# Patient Record
Sex: FEMALE | Race: BLACK OR AFRICAN AMERICAN | NOT HISPANIC OR LATINO | Employment: OTHER | URBAN - METROPOLITAN AREA
[De-identification: names, ages, dates, MRNs, and addresses within clinical notes are randomized per-mention and may not be internally consistent; named-entity substitution may affect disease eponyms.]

---

## 2019-02-06 ENCOUNTER — OFFICE VISIT (OUTPATIENT)
Dept: RHEUMATOLOGY | Facility: CLINIC | Age: 69
End: 2019-02-06
Payer: MEDICARE

## 2019-02-06 ENCOUNTER — LAB VISIT (OUTPATIENT)
Dept: LAB | Facility: HOSPITAL | Age: 69
End: 2019-02-06
Attending: INTERNAL MEDICINE
Payer: MEDICARE

## 2019-02-06 VITALS
HEART RATE: 64 BPM | HEIGHT: 65 IN | SYSTOLIC BLOOD PRESSURE: 120 MMHG | WEIGHT: 137.13 LBS | DIASTOLIC BLOOD PRESSURE: 60 MMHG | BODY MASS INDEX: 22.85 KG/M2

## 2019-02-06 DIAGNOSIS — R53.83 FATIGUE, UNSPECIFIED TYPE: ICD-10-CM

## 2019-02-06 DIAGNOSIS — Z79.52 LONG TERM CURRENT USE OF SYSTEMIC STEROIDS: ICD-10-CM

## 2019-02-06 DIAGNOSIS — M35.1 MIXED CONNECTIVE TISSUE DISEASE: ICD-10-CM

## 2019-02-06 DIAGNOSIS — M35.1 MIXED CONNECTIVE TISSUE DISEASE: Primary | ICD-10-CM

## 2019-02-06 DIAGNOSIS — Z79.60 LONG-TERM USE OF IMMUNOSUPPRESSANT MEDICATION: ICD-10-CM

## 2019-02-06 DIAGNOSIS — Z71.89 COUNSELING ON HEALTH PROMOTION AND DISEASE PREVENTION: ICD-10-CM

## 2019-02-06 DIAGNOSIS — R49.0 DYSPHONIA: ICD-10-CM

## 2019-02-06 LAB
ALBUMIN SERPL BCP-MCNC: 3.5 G/DL
ALP SERPL-CCNC: 67 U/L
ALT SERPL W/O P-5'-P-CCNC: 35 U/L
ANION GAP SERPL CALC-SCNC: 11 MMOL/L
AST SERPL-CCNC: 51 U/L
BASOPHILS # BLD AUTO: 0.01 K/UL
BASOPHILS NFR BLD: 0.1 %
BILIRUB SERPL-MCNC: 0.3 MG/DL
BUN SERPL-MCNC: 11 MG/DL
C3 SERPL-MCNC: 126 MG/DL
C4 SERPL-MCNC: 19 MG/DL
CALCIUM SERPL-MCNC: 9.7 MG/DL
CCP AB SER IA-ACNC: 2 U/ML
CHLORIDE SERPL-SCNC: 104 MMOL/L
CO2 SERPL-SCNC: 25 MMOL/L
CREAT SERPL-MCNC: 0.8 MG/DL
CRP SERPL-MCNC: 10.5 MG/L
DIFFERENTIAL METHOD: ABNORMAL
EOSINOPHIL # BLD AUTO: 0 K/UL
EOSINOPHIL NFR BLD: 0.5 %
ERYTHROCYTE [DISTWIDTH] IN BLOOD BY AUTOMATED COUNT: 16 %
ERYTHROCYTE [SEDIMENTATION RATE] IN BLOOD BY WESTERGREN METHOD: 57 MM/HR
EST. GFR  (AFRICAN AMERICAN): >60 ML/MIN/1.73 M^2
EST. GFR  (NON AFRICAN AMERICAN): >60 ML/MIN/1.73 M^2
GLUCOSE SERPL-MCNC: 93 MG/DL
HCT VFR BLD AUTO: 39.4 %
HGB BLD-MCNC: 12.5 G/DL
LYMPHOCYTES # BLD AUTO: 1.3 K/UL
LYMPHOCYTES NFR BLD: 15.1 %
MCH RBC QN AUTO: 26.8 PG
MCHC RBC AUTO-ENTMCNC: 31.7 G/DL
MCV RBC AUTO: 84 FL
MONOCYTES # BLD AUTO: 0.3 K/UL
MONOCYTES NFR BLD: 4.1 %
NEUTROPHILS # BLD AUTO: 6.7 K/UL
NEUTROPHILS NFR BLD: 80.7 %
PLATELET # BLD AUTO: 277 K/UL
PMV BLD AUTO: 10.9 FL
POTASSIUM SERPL-SCNC: 4.3 MMOL/L
PROT SERPL-MCNC: 9.7 G/DL
RBC # BLD AUTO: 4.67 M/UL
RHEUMATOID FACT SERPL-ACNC: <10 IU/ML
SODIUM SERPL-SCNC: 140 MMOL/L
WBC # BLD AUTO: 8.34 K/UL

## 2019-02-06 PROCEDURE — 86431 RHEUMATOID FACTOR QUANT: CPT

## 2019-02-06 PROCEDURE — 86255 FLUORESCENT ANTIBODY SCREEN: CPT

## 2019-02-06 PROCEDURE — 84165 PROTEIN E-PHORESIS SERUM: CPT | Mod: 26,,, | Performed by: PATHOLOGY

## 2019-02-06 PROCEDURE — 86334 IMMUNOFIX E-PHORESIS SERUM: CPT

## 2019-02-06 PROCEDURE — G0009 ADMIN PNEUMOCOCCAL VACCINE: HCPCS | Mod: PBBFAC,PN

## 2019-02-06 PROCEDURE — 86160 COMPLEMENT ANTIGEN: CPT

## 2019-02-06 PROCEDURE — 86334 PATHOLOGIST INTERPRETATION IFE: ICD-10-PCS | Mod: 26,,, | Performed by: PATHOLOGY

## 2019-02-06 PROCEDURE — 86140 C-REACTIVE PROTEIN: CPT

## 2019-02-06 PROCEDURE — 90471 IMMUNIZATION ADMIN: CPT | Mod: PBBFAC,PN

## 2019-02-06 PROCEDURE — 80053 COMPREHEN METABOLIC PANEL: CPT

## 2019-02-06 PROCEDURE — 84165 PATHOLOGIST INTERPRETATION SPE: ICD-10-PCS | Mod: 26,,, | Performed by: PATHOLOGY

## 2019-02-06 PROCEDURE — 99203 OFFICE O/P NEW LOW 30 MIN: CPT | Mod: PBBFAC,PN,25 | Performed by: INTERNAL MEDICINE

## 2019-02-06 PROCEDURE — 86160 COMPLEMENT ANTIGEN: CPT | Mod: 59

## 2019-02-06 PROCEDURE — 99205 OFFICE O/P NEW HI 60 MIN: CPT | Mod: S$PBB,,, | Performed by: INTERNAL MEDICINE

## 2019-02-06 PROCEDURE — 85652 RBC SED RATE AUTOMATED: CPT

## 2019-02-06 PROCEDURE — 84165 PROTEIN E-PHORESIS SERUM: CPT

## 2019-02-06 PROCEDURE — 86225 DNA ANTIBODY NATIVE: CPT

## 2019-02-06 PROCEDURE — 99999 PR PBB SHADOW E&M-NEW PATIENT-LVL III: CPT | Mod: PBBFAC,,, | Performed by: INTERNAL MEDICINE

## 2019-02-06 PROCEDURE — 86334 IMMUNOFIX E-PHORESIS SERUM: CPT | Mod: 26,,, | Performed by: PATHOLOGY

## 2019-02-06 PROCEDURE — 86200 CCP ANTIBODY: CPT

## 2019-02-06 PROCEDURE — 85025 COMPLETE CBC W/AUTO DIFF WBC: CPT

## 2019-02-06 PROCEDURE — 99205 PR OFFICE/OUTPT VISIT, NEW, LEVL V, 60-74 MIN: ICD-10-PCS | Mod: S$PBB,,, | Performed by: INTERNAL MEDICINE

## 2019-02-06 PROCEDURE — 99999 PR PBB SHADOW E&M-NEW PATIENT-LVL III: ICD-10-PCS | Mod: PBBFAC,,, | Performed by: INTERNAL MEDICINE

## 2019-02-06 PROCEDURE — 83516 IMMUNOASSAY NONANTIBODY: CPT

## 2019-02-06 PROCEDURE — 90472 IMMUNIZATION ADMIN EACH ADD: CPT | Mod: PBBFAC,PN

## 2019-02-06 PROCEDURE — 80074 ACUTE HEPATITIS PANEL: CPT

## 2019-02-06 RX ORDER — PREDNISONE 10 MG/1
TABLET ORAL
Qty: 23 TABLET | Refills: 0 | Status: SHIPPED | OUTPATIENT
Start: 2019-02-06 | End: 2019-02-20

## 2019-02-06 RX ORDER — FOLIC ACID 0.8 MG
800 TABLET ORAL DAILY
COMMUNITY
End: 2023-06-26

## 2019-02-06 RX ORDER — CARVEDILOL 3.12 MG/1
3.12 TABLET ORAL 2 TIMES DAILY WITH MEALS
COMMUNITY
End: 2023-06-26

## 2019-02-06 RX ORDER — AMLODIPINE BESYLATE 5 MG/1
5 TABLET ORAL DAILY
COMMUNITY
End: 2023-06-26

## 2019-02-06 RX ORDER — LOSARTAN POTASSIUM 50 MG/1
50 TABLET ORAL DAILY
COMMUNITY
End: 2023-03-17

## 2019-02-06 RX ORDER — PREDNISONE 5 MG/1
5 TABLET ORAL DAILY
COMMUNITY
End: 2019-02-20 | Stop reason: SDUPTHER

## 2019-02-06 RX ORDER — PANTOPRAZOLE SODIUM 20 MG/1
20 TABLET, DELAYED RELEASE ORAL
COMMUNITY
End: 2023-03-27 | Stop reason: ALTCHOICE

## 2019-02-06 RX ORDER — AZATHIOPRINE 50 MG/1
50 TABLET ORAL 2 TIMES DAILY
COMMUNITY
End: 2019-02-20 | Stop reason: SDUPTHER

## 2019-02-06 NOTE — PROGRESS NOTES
Administered 0.5 cc Prevnar vaccination to Right Deltoid. Pt tolerated well. No acute reaction noted to site. Pt instructed on S/S to report. Pt verbalized understanding. Patient waited 15 minutes post injection    Lot:[id number]  Exp:9/20  Manu:[employer]    Administered 0.5cc TDap vaccination to Left Deltoid. Pt tolerated well. No acute reaction noted to site. Pt instructed on S/S to report. Pt verbalized understanding.    Lot:[id number]  Exp:10/25/2020  Manu:[employer]

## 2019-02-06 NOTE — PROGRESS NOTES
RHEUMATOLOGY OUTPATIENT CLINIC NOTE    2/6/2019    Attending Rheumatologist: Zachariah Thurman  Primary Care Provider: Ariana Rebollar MD   Physician Requesting Consultation: Ariana Rebollar MD  [address] [address]  Chief Complaint/Reason For Consultation:  Connective Tissue Disorder and New Patient      Subjective:       DOYLE Fernandes is a 68 y.o. Black or  female with historical diagnosis of mixed connective tissue disease refer for rheumatic evaluation.  Diagnosis was made by an outside rheumatologist after musculoskeletal complaints, Raynaud's phenomenon, sicca symptoms, positive autoantibodies, chronic dyspnea.  Further workup revealed restrictive lung pattern ILD and pulmonary hypertension by catheterization.  She has being on chronic immunosuppression with prednisone currently on 7.5 mg, from 40mg.  Was also started on azathioprine a steroid sparring agent for ILD.  Patient has developed severe GERD and odynophagia, worked up and managed by outside ENT and gastroenterologist.  Refers having a polyp removed by ENT in the past, has no being seen recently.  Faxed records and notes provided by patient today reviewed. Follows with GI as well.  Her symptoms have been attributed to MSK involvement from MCTD and probable progressive GERD being managed with PPIs.  Patient refers worsening dysphonia and comes for evaluation as a 3rd opinion.     Today:  Patient's main complaint is progressive dysphonia.  Worsening over the past 6 months without any particular precipitating event.  Refers association chronic dysphagia and intermittent odynophagia to both solids and liquids.  Voices no acute complaints otherwise.  Her dyspnea remains stable.  Refers also having chronic productive cough of yellowish sputum and brownish mucus plugs with intermittent blood tinge.  Denies having any joint pain or swelling.  Refers chronic paresthesias of her hands with median nerve  distribution.  Raynaud's is stable without ulcers.  No photosensitivity, rash,  or GI complaints otherwise.    Review of Systems   Constitutional: Negative for chills, fever and weight loss.   HENT:        Denies eye or mouth sicca symptoms, denies oral ulcers, denies parotid swelling, denies swollen glands.   Eyes: Negative for blurred vision, pain and redness.   Respiratory: Positive for cough (productive of yellowish sputum, intetmittent brownish and blood tinged discoloration), sputum production and shortness of breath.    Cardiovascular: Negative for chest pain, orthopnea, leg swelling and PND.   Gastrointestinal: Negative for abdominal pain, blood in stool, constipation, diarrhea and heartburn.        + dysphagia and dysphonia, chronic.   Genitourinary: Negative for dysuria and hematuria.        Denies genital ulcers or sicca symptoms, denies foaming of the urine   Musculoskeletal: Negative for back pain, joint pain, myalgias and neck pain.   Skin: Negative for rash.        Denies photosensitivity, denies alopecia, denies sclerodactyly,+ Raynaud's phenomenon,denies digital ulcers, no psoriasis, ulcerations, no purpura, denies nodules.   Neurological: Negative for tingling, sensory change, focal weakness, seizures, weakness and headaches.   Endo/Heme/Allergies: Does not bruise/bleed easily.   Psychiatric/Behavioral: Negative for substance abuse. The patient does not have insomnia.    All other systems reviewed and are negative.      Chronic comorbid conditions affecting medical decision making today:  Past Medical History:   Diagnosis Date    Allergy     GERD (gastroesophageal reflux disease)    · MCTD (MSK manifestations, RP, Sicca, + Ab, lung fibrosis, pHTN)  · Hypertension  · PAOLA    No past surgical history on file.  No family history on file.  Social History     Substance and Sexual Activity   Alcohol Use Not on file     Social History     Tobacco Use   Smoking Status Not on file     Social History      Substance and Sexual Activity   Drug Use Not on file     No current outpatient medications on file.     Objective:         Vitals:    02/06/19 1420   BP: 120/60   Pulse: 64     Physical Exam   Nursing note and vitals reviewed.  Constitutional: She is oriented to person, place, and time and well-developed, well-nourished, and in no distress. No distress.   HENT:   Head: Normocephalic.   + dysphonic voice.   Eyes: Conjunctivae are normal. Pupils are equal, round, and reactive to light.   Absent Episcleritis/scleritis.   Neck: Normal range of motion.   Cardiovascular: Normal rate and intact distal pulses.    Pulmonary/Chest: Effort normal. No respiratory distress.   Abdominal: Soft. She exhibits no distension.   Neurological: She is alert and oriented to person, place, and time. No cranial nerve deficit. Gait normal.   absent sensory deficits  muscle strength 5/5 through.   SLR -, Lhermitte's sign - Spurling's test -   Skin: No rash noted.     No Skin fibrosis, teleangiectasias, rashes, discoid lesions, purpura, skin ulcers, nodules, or livedo reticularis, nail pitting.   Psychiatric:   Anxious.   Musculoskeletal: Normal range of motion. She exhibits no edema or deformity.   : strong  No synovitis.    AROM: intact  PROM: intact    Devices used by patient: none       Reviewed old and all outside pertinent medical records available.    All lab results personally reviewed and interpreted by me.  No results found for: WBC, HGB, HCT, MCV, MCH, MCHC, RDW, PLT, MPV, NEUTROABS, LYMPHOABS, MONOABS, EOSINOABS, BASOSABS, NEUTROPCT, LYMPHOPCT, MONOPCT, EOSINOPCT, BASOPCT, DIFFTYPE, RBCMORPHOLOG, PLTEST    No results found for: NA, K, CL, CO2, GLU, BUN, LABCREA, CALCIUM, PROT, ALBUMIN, BILITOT, AST, ALKPHOS, ALT, GFRAA, GFRNONAA    No results found for: COLORU, APPEARANCEUA, SPECGRAV, PHUR, PROTEINUA, GLUCOSEU, KETONESU, BLOODU, LEUKOCYTESUR, NITRITE, UROBILINOGEN    No results found for: CRP    No results found for:  SEDRATE, ERYTHROCYTES    No results found for: ISMAEL, RF, SEDRATE    No components found for: 25OHVITDTOT, 19PYUPHJ2, 92ZFUNCA2, METHODNOTE    No results found for: URICACID    No components found for: TSPOTTB    Labs on referral attachment dated June 2018  · Urinalysis normal    Rheum Labs:  ISMAEL 1 in 320 speckled pattern  RNP 6.2 (reference less than 1)  SSA/B negative  Anti Smith negative  SCL 70 antibody negative  C3/4 within normal range  ESR 63  CRP 0.2     Infectious Labs:  n/a     Imaging:  All imaging reviewed and independently  interpreted by me.    CT maxillofacial June 2016  Septal deviation.  Mucous cyst right maxillary sinus.    CT chest November 2016  Patchy ground-glass opacities, improvement compared to previous imaging.     ASSESSMENT / PLAN:     Devi Fernandes is a 68 y.o. Black or  female with:    1.  Mixed connective tissue disease  - Historical diagnosis, per referral attachment based on musculoskeletal complaints, Raynaud's phenomenon, sicca symptoms  - also reported for, lung fibrosis, and pulmonary hypertension.  - she follows with outside pulmonologist which per patient, described with minimal changes in her fibrotic disease  - currently on management with PDN 7.5mg and AZA 50mg BID for several years  - patient brought significant records for review on her appointment.  Will review these and document after patient's visit.  - recommend trial of increasing prednisone from 20 back down to her dose of 7.5 mg consider polychondritis manifesting as dysphonia.  Considering patient's symptoms are mostly related to progressive GERD.  - will recommend for ENT evaluation for 2nd opinion for progressive dysphonia.  - Follow up with GI for GERD management  - recommend to assess for clinical markers of disease activity and end organ involvement.  Workup as below  - CBC, CMP, ESR, CRP  - C3/4, double-stranded DNA  - urinalysis, protein creatinine ratio  - SPEP, IFA    2. Chronic  DMARD use  - monitor for toxicity  - CBC, CMP  - acute hepatitis panel, QuantiFERON TB    3. Other specified counseling  - over 10 minutes spent regarding below topics:  - Immunization counseling done.  PSV13 and TDAP provided today.  Will recommend for PSV23 on 8 weeks  - Medication counseling provided.    RTC 2 weeks    Method of contact with patient concerns: [facility]    Disclaimer:  This note is prepared using voice recognition software and as such is likely to have errors and has not been proof read. Please contact me for questions.     Time spent: 60 minutes in face to face discussion concerning diagnosis, prognosis, review of lab and test results, benefits of treatment as well as management of disease, counseling of patient and coordination of care between various health care providers.  Greater than half the time spent was used for coordination of care and counseling of patient.    Zachariah Thurman M.D.  Rheumatology Department   [facility]

## 2019-02-06 NOTE — PATIENT INSTRUCTIONS
Azathioprine tablets  What is this medicine?  AZATHIOPRINE (ay za THYE oh preen) suppresses the immune system. It is used to prevent organ rejection after a transplant. It is also used to treat rheumatoid arthritis.  How should I use this medicine?  Take this medicine by mouth with a full glass of water. Follow the directions on the prescription label. Take your medicine at regular intervals. Do not take your medicine more often than directed. Continue to take your medicine even if you feel better. Do not stop taking except on your doctor's advice.  Talk to your pediatrician regarding the use of this medicine in children. Special care may be needed.  What side effects may I notice from receiving this medicine?  Side effects that you should report to your doctor or health care professional as soon as possible:  · allergic reactions like skin rash, itching or hives, swelling of the face, lips, or tongue  · changes in vision  · confusion  · fever, chills, or any other sign of infection  · loss of balance or coordination  · severe stomach pain  · unusual bleeding, bruising  · unusually weak or tired  · vomiting  · yellowing of the eyes or skin  Side effects that usually do not require medical attention (report to your doctor or health care professional if they continue or are bothersome):  · hair loss  · nausea  What may interact with this medicine?  Do not take this medicine with any of the following medications:  · febuxostat  · mercaptopurine  This medicine may also interact with the following medications:  · allopurinol  · aminosalicylates like sulfasalazine, mesalamine, balsalazide, and olsalazine  · leflunomide  · medicines called ACE inhibitors like benazepril, captopril, enalapril, fosinopril, quinapril, lisinopril, ramipril, and trandolapril  · mycophenolate  · sulfamethoxazole; trimethoprim  · vaccines  · warfarin  What if I miss a dose?  If you miss a dose, take it as soon as you can. If it is almost time  for your next dose, take only that dose. Do not take double or extra doses.  Where should I keep my medicine?  Keep out of the reach of children.  Store at room temperature between 15 and 25 degrees C (59 and 77 degrees F). Protect from light. Throw away any unused medicine after the expiration date.  What should I tell my health care provider before I take this medicine?  They need to know if you have any of these conditions:  · infection  · kidney disease  · liver disease  · an unusual or allergic reaction to azathioprine, other medicines, lactose, foods, dyes, or preservatives  · pregnant or trying to get pregnant  · breast feeding  What should I watch for while using this medicine?  Visit your doctor or health care professional for regular checks on your progress. You will need frequent blood checks during the first few months you are receiving the medicine.  If you get a cold or other infection while receiving this medicine, call your doctor or health care professional. Do not treat yourself. The medicine may increase your risk of getting an infection.  Women should inform their doctor if they wish to become pregnant or think they might be pregnant. There is a potential for serious side effects to an unborn child. Talk to your health care professional or pharmacist for more information.  Men may have a reduced sperm count while they are taking this medicine. Talk to your health care professional for more information.  This medicine may increase your risk of getting certain kinds of cancer. Talk to your doctor about healthy lifestyle choices, important screenings, and your risk.  NOTE:This sheet is a summary. It may not cover all possible information. If you have questions about this medicine, talk to your doctor, pharmacist, or health care provider. Copyright© 2017 [employer]        Prednisolone tablets  What is this medicine?  PREDNISOLONE (pred NISS oh lone) is a corticosteroid. It is commonly used to  treat inflammation of the skin, joints, lungs, and other organs. Common conditions treated include asthma, allergies, and arthritis. It is also used for other conditions, such as blood disorders and diseases of the adrenal glands.  How should I use this medicine?  Take this medicine by mouth with a glass of water. Follow the directions on the prescription label. Take it with food or milk to avoid stomach upset. If you are taking this medicine once a day, take it in the morning. Do not take more medicine than you are told to take. Do not suddenly stop taking your medicine because you may develop a severe reaction. Your doctor will tell you how much medicine to take. If your doctor wants you to stop the medicine, the dose may be slowly lowered over time to avoid any side effects.  Talk to your pediatrician regarding the use of this medicine in children. Special care may be needed.  What side effects may I notice from receiving this medicine?  Side effects that you should report to your doctor or health care professional as soon as possible:  · allergic reactions like skin rash, itching or hives, swelling of the face, lips, or tongue  · changes in emotions or moods  · changes in vision  · eye pain  · signs and symptoms of high blood sugar such as dizziness; dry mouth; dry skin; fruity breath; nausea; stomach pain; increased hunger or thirst; increased urination  · signs and symptoms of infection like fever or chills; cough; sore throat; pain or trouble passing urine  · slow growth in children (if used for longer periods of time)  · swelling of ankles, feet  · trouble sleeping  · unusually weak or tired  · weak bones (if used for longer periods of time)  Side effects that usually do not require medical attention (Report these to your doctor or health care professional if they continue or are bothersome.):  · increased hunger  · nausea  · skin problems, acne, thin and shiny skin  · upset stomach  · weight gain  What may  interact with this medicine?  Do not take this medicine with any of the following medications:  · metyrapone  · mifepristone  This medicine may also interact with the following medications:  · aminoglutethimide  · amphotericin B  · aspirin and aspirin-like medicines  · barbiturates  · certain medicines for diabetes, like glipizide or glyburide  · cholestyramine  · cholinesterase inhibitors  · cyclosporine  · digoxin  · diuretics  · ephedrine  · female hormones, like estrogens and birth control pills  · isoniazid  · ketoconazole  · NSAIDS, medicines for pain and inflammation, like ibuprofen or naproxen  · phenytoin  · rifampin  · toxoids  · vaccines  · warfarin  What if I miss a dose?  If you miss a dose, take it as soon as you can. If it is almost time for your next dose, take only that dose. Do not take double or extra doses.  Where should I keep my medicine?  Keep out of the reach of children.  Store at room temperature between 15 and 30 degrees C (59 and 86 degrees F). Keep container tightly closed. Throw away any unused medicine after the expiration date.  What should I tell my health care provider before I take this medicine?  They need to know if you have any of these conditions:  · Cushing's syndrome  · diabetes  · glaucoma  · heart problems or disease  · high blood pressure  · infection such as herpes, measles, tuberculosis, or chickenpox  · kidney disease  · liver disease  · mental problems  · myasthenia gravis  · osteoporosis  · seizures  · stomach ulcer or intestine disease including colitis and diverticulitis  · thyroid problem  · an unusual or allergic reaction to lactose, prednisolone, other medicines, foods, dyes, or preservatives  · pregnant or trying to get pregnant  · breast-feeding  What should I watch for while using this medicine?  Visit your doctor or health care professional for regular checks on your progress. If you are taking this medicine over a prolonged period, carry an identification  card with your name and address, the type and dose of your medicine, and your doctor's name and address.  This medicine may increase your risk of getting an infection. Tell your doctor or health care professional if you are around anyone with measles or chickenpox, or if you develop sores or blisters that do not heal properly.  If you are going to have surgery, tell your doctor or health care professional that you have taken this medicine within the last twelve months.  Ask your doctor or health care professional about your diet. You may need to lower the amount of salt you eat.  This medicine may affect blood sugar levels. If you have diabetes, check with your doctor or health care professional before you change your diet or the dose of your diabetic medicine.  NOTE:This sheet is a summary. It may not cover all possible information. If you have questions about this medicine, talk to your doctor, pharmacist, or health care provider. Copyright© 2017 [employer]

## 2019-02-06 NOTE — LETTER
February 6, 2019      Ariana Rebollar MD  [address]  [place] [address]           [facility] Rheumatology  [address] [place] [address] [address] [place] [address]  Phone: [phone]  Fax: [phone]          Patient: Devi Fernandes   MR Number: [id number]   YOB: 1950   Date of Visit: 2/6/2019       Dear Dr. Ariana Rebollar:    Thank you for referring Devi Fernandes to me for evaluation. Attached you will find relevant portions of my assessment and plan of care.    If you have questions, please do not hesitate to call me. I look forward to following Devi Fernandes along with you.    Sincerely,    Zachariah Thurman MD    Enclosure  CC:  No Recipients    If you would like to receive this communication electronically, please contact [email or web address] or [phone] to request more information on [facility] access.    For providers and/or their staff who would like to refer a patient to [facility], please contact us through our one-stop-shop provider referral line, [facility], at [phone].    If you feel you have received this communication in error or would no longer like to receive these types of communications, please e-mail [email or web address]

## 2019-02-07 ENCOUNTER — TELEPHONE (OUTPATIENT)
Dept: OBSTETRICS AND GYNECOLOGY | Facility: CLINIC | Age: 69
End: 2019-02-07

## 2019-02-07 LAB
ALBUMIN SERPL ELPH-MCNC: 3.56 G/DL
ALPHA1 GLOB SERPL ELPH-MCNC: 0.33 G/DL
ALPHA2 GLOB SERPL ELPH-MCNC: 0.93 G/DL
B-GLOBULIN SERPL ELPH-MCNC: 1.36 G/DL
DSDNA AB SER-ACNC: NORMAL [IU]/ML
GAMMA GLOB SERPL ELPH-MCNC: 2.82 G/DL
HAV IGM SERPL QL IA: NEGATIVE
HBV CORE IGM SERPL QL IA: NEGATIVE
HBV SURFACE AG SERPL QL IA: NEGATIVE
HCV AB SERPL QL IA: NEGATIVE
INTERPRETATION SERPL IFE-IMP: NORMAL
PATHOLOGIST INTERPRETATION IFE: NORMAL
PATHOLOGIST INTERPRETATION SPE: NORMAL
PROT SERPL-MCNC: 9 G/DL

## 2019-02-07 NOTE — TELEPHONE ENCOUNTER
Patient seen yesterday. States that she is suppose to start on a new medication and nothing sent to pharmacy.

## 2019-02-07 NOTE — TELEPHONE ENCOUNTER
----- Message from Barby Bush sent at 2/7/2019  8:17 AM CST -----  Contact: pt  Type:  Needs Medical Advice    Who Called: Pt  Symptoms (please be specific): Pt was in clinic yesterday, pt states she need more information and other questions.   How long has patient had these symptoms: na  Would the patient rather a call back or a response via [facility]? Call [facility] Call Back Number: [phone]  Additional Information: na

## 2019-02-08 LAB
ANCA AB TITR SER IF: NORMAL TITER
MYELOPEROXIDASE AB SER-ACNC: 9 UNITS
P-ANCA TITR SER IF: NORMAL TITER
PROTEINASE3 IGG SER-ACNC: <0.2 U

## 2019-02-18 ENCOUNTER — TELEPHONE (OUTPATIENT)
Dept: OBSTETRICS AND GYNECOLOGY | Facility: CLINIC | Age: 69
End: 2019-02-18

## 2019-02-18 ENCOUNTER — TELEPHONE (OUTPATIENT)
Dept: RHEUMATOLOGY | Facility: CLINIC | Age: 69
End: 2019-02-18

## 2019-02-18 NOTE — TELEPHONE ENCOUNTER
Spoke with Dr. Henry's office and they do not have any chest Ct records but they will fax over heart cath. Confirmed correct fax number.

## 2019-02-18 NOTE — TELEPHONE ENCOUNTER
----- Message from Koby Mclean sent at 2/18/2019 11:59 AM CST -----  Contact: pt  Type:  Needs Medical Advice    Who Called: Pt  Symptoms (please be specific): N/A   How long has patient had these symptoms:  N/A  Pharmacy name and phone #:  N/A  Would the patient rather a call back or a response via [facility]? Call nupur  Best Call Back Number: [phone] (home)   Additional Information: She is calling in regards to getting a copy of her CT Chest scan and a cath report of her RT side sent to 's office.

## 2019-02-18 NOTE — TELEPHONE ENCOUNTER
Spoke with patient. Informed her that I do not have any record of a Chest Ct or Cath report to send to Dr. Laguna. Patient states that the information given is wrong. Dr. Thurman is requesting the 2 test from Dr. Atkinson. Called and left message with Dr. Atkinson's medical records office ( [phone] )  to fax results.

## 2019-02-20 ENCOUNTER — OFFICE VISIT (OUTPATIENT)
Dept: OTOLARYNGOLOGY | Facility: CLINIC | Age: 69
End: 2019-02-20
Payer: MEDICARE

## 2019-02-20 ENCOUNTER — OFFICE VISIT (OUTPATIENT)
Dept: RHEUMATOLOGY | Facility: CLINIC | Age: 69
End: 2019-02-20
Payer: MEDICARE

## 2019-02-20 VITALS
HEART RATE: 75 BPM | DIASTOLIC BLOOD PRESSURE: 66 MMHG | SYSTOLIC BLOOD PRESSURE: 123 MMHG | WEIGHT: 139.56 LBS | BODY MASS INDEX: 23.22 KG/M2 | TEMPERATURE: 99 F

## 2019-02-20 VITALS
SYSTOLIC BLOOD PRESSURE: 130 MMHG | DIASTOLIC BLOOD PRESSURE: 78 MMHG | WEIGHT: 140.44 LBS | HEART RATE: 82 BPM | HEIGHT: 65 IN | BODY MASS INDEX: 23.4 KG/M2

## 2019-02-20 DIAGNOSIS — R74.8 ELEVATED CK: ICD-10-CM

## 2019-02-20 DIAGNOSIS — R49.0 DYSPHONIA: ICD-10-CM

## 2019-02-20 DIAGNOSIS — Z79.52 LONG TERM CURRENT USE OF SYSTEMIC STEROIDS: ICD-10-CM

## 2019-02-20 DIAGNOSIS — M35.1 MIXED CONNECTIVE TISSUE DISEASE: Primary | ICD-10-CM

## 2019-02-20 DIAGNOSIS — R49.0 DYSPHONIA: Primary | ICD-10-CM

## 2019-02-20 DIAGNOSIS — Z71.89 COUNSELING ON HEALTH PROMOTION AND DISEASE PREVENTION: ICD-10-CM

## 2019-02-20 DIAGNOSIS — H92.03 OTALGIA OF BOTH EARS: ICD-10-CM

## 2019-02-20 DIAGNOSIS — T17.308A CHOKING, INITIAL ENCOUNTER: ICD-10-CM

## 2019-02-20 DIAGNOSIS — Z79.60 LONG-TERM USE OF IMMUNOSUPPRESSANT MEDICATION: ICD-10-CM

## 2019-02-20 PROCEDURE — 99499 NO LOS: ICD-10-PCS | Mod: S$PBB,,, | Performed by: PHYSICIAN ASSISTANT

## 2019-02-20 PROCEDURE — 99214 OFFICE O/P EST MOD 30 MIN: CPT | Mod: S$PBB,,, | Performed by: INTERNAL MEDICINE

## 2019-02-20 PROCEDURE — 99999 PR PBB SHADOW E&M-EST. PATIENT-LVL III: ICD-10-PCS | Mod: PBBFAC,,, | Performed by: PHYSICIAN ASSISTANT

## 2019-02-20 PROCEDURE — 99213 OFFICE O/P EST LOW 20 MIN: CPT | Mod: PBBFAC | Performed by: PHYSICIAN ASSISTANT

## 2019-02-20 PROCEDURE — 99214 PR OFFICE/OUTPT VISIT, EST, LEVL IV, 30-39 MIN: ICD-10-PCS | Mod: S$PBB,,, | Performed by: INTERNAL MEDICINE

## 2019-02-20 PROCEDURE — 99999 PR PBB SHADOW E&M-EST. PATIENT-LVL III: CPT | Mod: PBBFAC,,, | Performed by: INTERNAL MEDICINE

## 2019-02-20 PROCEDURE — 99999 PR PBB SHADOW E&M-EST. PATIENT-LVL III: CPT | Mod: PBBFAC,,, | Performed by: PHYSICIAN ASSISTANT

## 2019-02-20 PROCEDURE — 99999 PR PBB SHADOW E&M-EST. PATIENT-LVL III: ICD-10-PCS | Mod: PBBFAC,,, | Performed by: INTERNAL MEDICINE

## 2019-02-20 PROCEDURE — 99213 OFFICE O/P EST LOW 20 MIN: CPT | Mod: PBBFAC,27,PN | Performed by: INTERNAL MEDICINE

## 2019-02-20 PROCEDURE — 99499 UNLISTED E&M SERVICE: CPT | Mod: S$PBB,,, | Performed by: PHYSICIAN ASSISTANT

## 2019-02-20 RX ORDER — PREDNISONE 5 MG/1
5 TABLET ORAL DAILY
Qty: 10 TABLET | Refills: 0 | Status: SHIPPED | OUTPATIENT
Start: 2019-02-20 | End: 2019-03-02

## 2019-02-20 RX ORDER — AZATHIOPRINE 50 MG/1
50 TABLET ORAL 2 TIMES DAILY
Qty: 60 TABLET | Refills: 2 | Status: SHIPPED | OUTPATIENT
Start: 2019-02-20 | End: 2019-02-26 | Stop reason: SDUPTHER

## 2019-02-20 RX ORDER — PREDNISONE 5 MG/1
5 TABLET ORAL DAILY
Qty: 30 TABLET | Refills: 2 | Status: SHIPPED | OUTPATIENT
Start: 2019-02-20 | End: 2019-02-27 | Stop reason: SDUPTHER

## 2019-02-20 NOTE — PROGRESS NOTES
RHEUMATOLOGY OUTPATIENT CLINIC NOTE    2/20/2019    Attending Rheumatologist: Zachariah Thurman  Primary Care Provider: Ariana Rebollar MD   Physician Requesting Consultation: No referring provider defined for this encounter.  Chief Complaint/Reason For Consultation:  MCTD      Subjective:       DOYLE Fernandes is a 68 y.o. Black or  female MCTD with ILD comes for follow up.    Today:  Last seen on 2/6.  Main complaint was worsening dysphonia, recommended for blood work and consider for the differential of active relapsing polychondritis or vasculitis.  Recommended to increase prednisone and taper slowly back to 7.5 mg per day.  Recommended for ENT evaluation.  Voices no complaints today.  Still with main issue of having dysphonia.  No effective dosage increase of prednisone.  Patient informs me today that she has been of as a therapy for the last 3 months.  Denies any worsening dyspnea.  Still with intermittent cough-nonproductive.  Denies any new joint pain, swelling,  or GI complaints otherwise.    Review of Systems   Constitutional: Negative for chills and fever.   HENT:        Denies eye or mouth sicca symptoms, denies oral ulcers, denies parotid swelling, denies swollen glands.   Eyes: Negative for pain and redness.   Respiratory: Positive for cough (productive of yellowish sputum, intetmittent brownish and blood tinged discoloration) and shortness of breath.    Cardiovascular: Negative for chest pain and leg swelling.   Gastrointestinal: Negative for abdominal pain, blood in stool and heartburn.        + dysphagia and dysphonia, chronic.   Genitourinary: Negative for dysuria, hematuria and urgency.   Musculoskeletal: Negative for back pain and joint pain.   Skin: Negative for rash.        Denies photosensitivity, denies alopecia, denies sclerodactyly,+ Raynaud's phenomenon,denies digital ulcers, no psoriasis, ulcerations, no purpura, denies nodules.   Neurological: Negative  for tingling and focal weakness.   Endo/Heme/Allergies: Does not bruise/bleed easily.   Psychiatric/Behavioral: Negative for substance abuse. The patient does not have insomnia.    All other systems reviewed and are negative.      Chronic comorbid conditions affecting medical decision making today:  Past Medical History:   Diagnosis Date    Allergy     Aortic regurgitation     Arthritis     Deviated nasal septum     Diabetes mellitus     Dysphagia     GERD (gastroesophageal reflux disease)     Hypertension     PAOLA (obstructive sleep apnea)     Pulmonary fibrosis     Pulmonary hypertension     Pulmonic regurgitation     Restrictive lung disease     Sinus bradycardia     Valvular heart disease    · MCTD (MSK manifestations, RP, Sicca, + Ab, lung fibrosis, pHTN)  · Hypertension  · PAOLA    Past Surgical History:   Procedure Laterality Date    APPENDECTOMY       SECTION      COLONOSCOPY  2011    ESOPHAGEAL MANOMETRY  2018    GASTROSCOPY  02/15/2018     Family History   Problem Relation Age of Onset    Cancer Mother     Diabetes Mellitus Father      Social History     Substance and Sexual Activity   Alcohol Use No    Frequency: Never     Social History     Tobacco Use   Smoking Status Never Smoker   Smokeless Tobacco Never Used     Social History     Substance and Sexual Activity   Drug Use No       Current Outpatient Medications:     amLODIPine (NORVASC) 5 MG tablet, Take 5 mg by mouth once daily., Disp: , Rfl:     azaTHIOprine (IMURAN) 50 mg Tab, Take 1 tablet (50 mg total) by mouth 2 (two) times daily., Disp: 60 tablet, Rfl: 2    carvedilol (COREG) 3.125 MG tablet, Take 3.125 mg by mouth 2 (two) times daily with meals., Disp: , Rfl:     folic acid (FOLVITE) 800 MCG Tab, Take 800 mcg by mouth once daily., Disp: , Rfl:     hypromellose (SYSTANE GEL OPHT), Apply 1 drop to eye 3 (three) times daily. I gel drop to each eye TID, Disp: , Rfl:     losartan (COZAAR) 50 MG tablet,  Take 50 mg by mouth once daily., Disp: , Rfl:     pantoprazole (PROTONIX) 20 MG tablet, Take 20 mg by mouth 2 (two) times daily before meals., Disp: , Rfl:     predniSONE (DELTASONE) 5 MG tablet, Take 1 tablet (5 mg total) by mouth once daily., Disp: 30 tablet, Rfl: 2    predniSONE (DELTASONE) 5 MG tablet, Take 1 tablet (5 mg total) by mouth once daily. for 10 days, Disp: 10 tablet, Rfl: 0     Objective:         Vitals:    02/20/19 1209   BP: 130/78   Pulse: 82     Physical Exam   Nursing note and vitals reviewed.  Constitutional: She is oriented to person, place, and time and well-developed, well-nourished, and in no distress. No distress.   HENT:   Head: Normocephalic.   + dysphonic voice.   Eyes: Conjunctivae are normal. Pupils are equal, round, and reactive to light.   Absent Episcleritis/scleritis.   Neck: Normal range of motion.   Cardiovascular: Normal rate and intact distal pulses.    Pulmonary/Chest: Effort normal. No respiratory distress.   Abdominal: Soft. She exhibits no distension.   Neurological: She is alert and oriented to person, place, and time.   absent sensory deficits  muscle strength 5/5 through.    Skin: No rash noted.     No Skin fibrosis, teleangiectasias, rashes, discoid lesions, purpura, skin ulcers, nodules, or livedo reticularis, nail pitting.   Psychiatric:   Anxious.   Musculoskeletal: Normal range of motion. She exhibits no edema or deformity.   : strong  No synovitis.    AROM: intact  PROM: intact    Devices used by patient: none       Reviewed old and all outside pertinent medical records available.    All lab results personally reviewed and interpreted by me.  Lab Results   Component Value Date    WBC 8.34 02/06/2019    HGB 12.5 02/06/2019    HCT 39.4 02/06/2019    MCV 84 02/06/2019    MCH 26.8 (L) 02/06/2019    MCHC 31.7 (L) 02/06/2019    RDW 16.0 (H) 02/06/2019     02/06/2019    MPV 10.9 02/06/2019       Lab Results   Component Value Date     02/06/2019    K  4.3 02/06/2019     02/06/2019    CO2 25 02/06/2019    GLU 93 02/06/2019    BUN 11 02/06/2019    CALCIUM 9.7 02/06/2019    PROT 9.7 (H) 02/06/2019    ALBUMIN 3.5 02/06/2019    BILITOT 0.3 02/06/2019    AST 51 (H) 02/06/2019    ALKPHOS 67 02/06/2019    ALT 35 02/06/2019       No results found for: COLORU, APPEARANCEUA, SPECGRAV, PHUR, PROTEINUA, GLUCOSEU, KETONESU, BLOODU, LEUKOCYTESUR, NITRITE, UROBILINOGEN    Lab Results   Component Value Date    CRP 10.5 (H) 02/06/2019       Lab Results   Component Value Date    SEDRATE 57 (H) 02/06/2019       Lab Results   Component Value Date    RF <10.0 02/06/2019    SEDRATE 57 (H) 02/06/2019       No components found for: 25OHVITDTOT, 13UTWTOQ5, 47OEHVDO4, METHODNOTE    No results found for: URICACID    No components found for: TSPOTTB    Labs on referral attachment dated June 2018  · Urinalysis normal    Rheum Labs:  ISMAEL 1 in 320 speckled pattern  RNP 6.2 (reference less than 1)  SSA/B negative  Anti histone 3.5 (+)  Anti Smith negative  SCL 70 antibody negative  C3/4 within normal range  Double-stranded DNA negative  ESR 63-> 57  CRP 0.2 -> 10.5  ANCA negative, MPO/UT 3 negative    Infectious Labs:  Hepatitis profile nonreactive    Imaging:  All imaging reviewed and independently  interpreted by me.    CT maxillofacial June 2016  Septal deviation.  Mucous cyst right maxillary sinus.    CT chest November 2016  Patchy ground-glass opacities, improvement compared to previous imaging.     ASSESSMENT / PLAN:     Devi Fernandes is a 68 y.o. Black or  female with:    1.  Mixed connective tissue disease  - associated with ILD anf pHTN.  These have remained stable for workup provided by patient.  - follows with a outside pulmonologist.  - no response to dysphonia with increased on prednisone.  Will continue with 5 mg per day.  - informs has been of a tired pain for the past 3 months.  Reason 50 mg b.i.d. for history of ILD.  - depending on symptoms and  objective studies, will consider tapering dose of Imuran.  - patient recently saw ENT.  Per her request, will await MDs input. Will follow-up assessment and recommendations for dysphonia.  - patient likely also has a component from GERD contributing to her symptoms.  She should follow with GI.  - markers of disease activity within normal range.  No objective evidence of vasculitic process.  - will continue to monitor    2. Chronic DMARD use  - monitor for toxicity  - CBC, CMP    3. Other specified counseling  - over 10 minutes spent regarding below topics:  - Immunization counseling done.  PSV13 given on 2/6.  PSV23 will be provided on next visit.  - Medication counseling provided.    RTC 3 months    Method of contact with patient concerns: [facility]    Disclaimer:  This note is prepared using voice recognition software and as such is likely to have errors and has not been proof read. Please contact me for questions.     Addendum:    - Velopharyngeal insufficiency, dysphonia, dysphagia, and soft palate paralysis.    - considering MCTD related myositis of pharyngeal muscle involvement (rare).  Would represent severe disease and is a poor prognostic feature.    - Agree with ENT in ruling out other neurological etiology, greatly appreciate the input.    - Recommend to re-check acute phase reactants and muscle enzymes, along with Myomarker panel.    - Will follow up MRI, pending results.  Depending on findings, may consider high dose corticosteroids and IVIG therapy.  - Discussed with patient, verbalized understanding    Zachariah Thurman M.D.  Rheumatology Department   [facility]

## 2019-02-20 NOTE — LETTER
February 20, 2019      Zachariah Thurman MD  [address] [place] [address]  [address]  [facility] [address]           [facility] O[facility]hinol[facility]ology  [address] [place] [id number]  Phone: [phone]  Fax: [phone]          Patient: Devi Fernandes   MR Number: [id number]   YOB: 1950   Date of Visit: 2/20/2019       Dear Dr. Zachariah Thurman:    Thank you for referring Devi Fernandes to me for evaluation. Attached you will find relevant portions of my assessment and plan of care.    If you have questions, please do not hesitate to call me. I look forward to following Devi Fernandes along with you.    Sincerely,    EUGENIE Julesosure  CC:  No Recipients    If you would like to receive this communication electronically, please contact [email or web address] or [phone] to request more information on [facility] access.    For providers and/or their staff who would like to refer a patient to [facility], please contact us through our one-stop-shop provider referral line, [facility], at [phone].    If you feel you have received this communication in error or would no longer like to receive these types of communications, please e-mail [email or web address]

## 2019-02-20 NOTE — PROGRESS NOTES
Subjective:       Patient ID: Devi Fernandes is a 68 y.o. female.    Chief Complaint: Dysphonia (Hx of Vocal cord surgery )    Ms. Fernandes is a very pleasant 67 yo female here to see me today with complaints of persistent hoarseness, difficulty swallowing, choking (worse with solid food), bilateral ear pain ( R>L). She has a complex medical history including connective tissue disorder (followed by doctor in [place]) and saw Dr. Thurman last month. She has  had vocal cord surgery (nodules)  and ear tube placement in 2017 in [place].  PCP referred her to [facility] but they were not accepting patients and recommended [facility] in [facility] but was able to get in here  To see us sooner.  Her initial Diagnosis was made by an outside rheumatologist after musculoskeletal complaints, Raynaud's phenomenon, sicca symptoms, positive autoantibodies, chronic dyspnea.  Further workup revealed restrictive lung pattern ILD and pulmonary hypertension by catheterization.  She has being on chronic immunosuppression with prednisone currently on 7.5 mg, from 40mg.  Was also started on azathioprine a steroid sparring agent for ILD.  Patient has developed severe GERD and odynophagia.      Review of Systems   Constitutional: Negative for chills, fatigue, fever and unexpected weight change.   HENT: Positive for ear pain, tinnitus and trouble swallowing. Negative for congestion, dental problem, ear discharge, facial swelling, hearing loss, nosebleeds, postnasal drip, rhinorrhea, sinus pressure, sneezing, sore throat and voice change.    Eyes: Negative for redness, itching and visual disturbance.   Respiratory: Positive for cough, choking and shortness of breath. Negative for wheezing.    Cardiovascular: Negative for chest pain and palpitations.   Gastrointestinal: Negative for abdominal pain.        No reflux.   Musculoskeletal: Negative for gait problem.   Skin: Negative for rash.   Neurological: Negative for dizziness,  light-headedness and headaches.       Objective:      Physical Exam   Constitutional: She is oriented to person, place, and time. She appears well-developed and well-nourished. No distress.   HENT:   Head: Normocephalic and atraumatic.   Right Ear: Tympanic membrane normal. No middle ear effusion.   Left Ear: Tympanic membrane normal.  No middle ear effusion.   Nose: Nose normal. No mucosal edema, rhinorrhea, nasal deformity or septal deviation. No epistaxis. Right sinus exhibits no maxillary sinus tenderness and no frontal sinus tenderness. Left sinus exhibits no maxillary sinus tenderness and no frontal sinus tenderness.   Mouth/Throat: Uvula is midline, oropharynx is clear and moist and mucous membranes are normal. Mucous membranes are not pale and not dry. No dental caries. No oropharyngeal exudate or posterior oropharyngeal erythema.   PET in right ear canal, Left PET embedded in cerumen, +hoarseness    Eyes: Conjunctivae, EOM and lids are normal. Pupils are equal, round, and reactive to light. Right eye exhibits no chemosis. Left eye exhibits no chemosis. Right conjunctiva is not injected. Left conjunctiva is not injected. No scleral icterus. Right eye exhibits normal extraocular motion and no nystagmus. Left eye exhibits normal extraocular motion and no nystagmus.   Neck: Trachea normal and phonation normal. No tracheal tenderness present. No tracheal deviation present. No thyroid mass and no thyromegaly present.   Cardiovascular: Intact distal pulses.   Pulmonary/Chest: Effort normal. No stridor. No respiratory distress.   Abdominal: She exhibits no distension.   Lymphadenopathy:        Head (right side): No submental, no submandibular, no preauricular, no posterior auricular and no occipital adenopathy present.        Head (left side): Submandibular adenopathy present. No submental, no preauricular, no posterior auricular and no occipital adenopathy present.     She has no cervical adenopathy.    Neurological: She is alert and oriented to person, place, and time. No cranial nerve deficit.   Skin: Skin is warm and dry. No rash noted. No erythema.   Psychiatric: She has a normal mood and affect. Her behavior is normal.       Assessment:       1. Dysphonia    2. Choking, initial encounter    3. Otalgia of both ears        Plan:       Pt questioned why she was not seeing the doctor today. I informed her that I was the only provider at this location today. I offered to not charge her for my visit and will have her return to clinic to see Dr. Andujar. She will need a scope to evaluate vocal cords. We also discussed the possibility of a swallow study. I had her sign a release form and would like to obtain her previous medical records from [place] and [place]. I would also like her to schedule an audiogram.  Pt understands and agrees with plan of care.

## 2019-02-22 ENCOUNTER — TELEPHONE (OUTPATIENT)
Dept: PHARMACY | Facility: CLINIC | Age: 69
End: 2019-02-22

## 2019-02-26 RX ORDER — AZATHIOPRINE 50 MG/1
50 TABLET ORAL 2 TIMES DAILY
COMMUNITY
End: 2019-02-26 | Stop reason: SDUPTHER

## 2019-02-26 NOTE — TELEPHONE ENCOUNTER
----- Message from Sapna Johnson PharmD sent at 2/26/2019  1:24 PM CST -----  Regarding: Azathioprine  Good afternoon Dr. Thurman and staff,    A prior authorization was not required for Ms. Fernandes's azathioprine and she would like to continue using her local pharmacy. Please send prescription to [facility] in [place], which has been added to the patients EPIC profile. Patient has been notified to obtain medication there once reordered.    To complete this in EPIC, the original order MUST be discontinued and re-typed as a new prescription with the updated pharmacy listed. Clicking reorder will continue to route the rx to [facility] even if the pharmacy is changed. [facility] when the BPA is fired.    Please let me know if I can be of further assistance. Thank you,  Sapna Johnson  [facility]  [phone]

## 2019-02-27 ENCOUNTER — OFFICE VISIT (OUTPATIENT)
Dept: OTOLARYNGOLOGY | Facility: CLINIC | Age: 69
End: 2019-02-27
Payer: MEDICARE

## 2019-02-27 ENCOUNTER — TELEPHONE (OUTPATIENT)
Dept: OTOLARYNGOLOGY | Facility: CLINIC | Age: 69
End: 2019-02-27

## 2019-02-27 VITALS
WEIGHT: 135.81 LBS | DIASTOLIC BLOOD PRESSURE: 71 MMHG | TEMPERATURE: 99 F | HEART RATE: 125 BPM | SYSTOLIC BLOOD PRESSURE: 127 MMHG | BODY MASS INDEX: 22.6 KG/M2

## 2019-02-27 DIAGNOSIS — R49.0 DYSPHONIA: ICD-10-CM

## 2019-02-27 DIAGNOSIS — R13.10 DYSPHAGIA, UNSPECIFIED TYPE: ICD-10-CM

## 2019-02-27 DIAGNOSIS — K13.79 SOFT PALATE PARALYSIS: ICD-10-CM

## 2019-02-27 DIAGNOSIS — G52.9 CRANIAL NERVE PALSY: Primary | ICD-10-CM

## 2019-02-27 DIAGNOSIS — K13.79 VELOPHARYNGEAL INSUFFICIENCY, ACQUIRED: ICD-10-CM

## 2019-02-27 PROCEDURE — 31575 PR LARYNGOSCOPY, FLEXIBLE; DIAGNOSTIC: ICD-10-PCS | Mod: S$PBB,,, | Performed by: ORTHOPAEDIC SURGERY

## 2019-02-27 PROCEDURE — 99999 PR PBB SHADOW E&M-EST. PATIENT-LVL IV: ICD-10-PCS | Mod: PBBFAC,,, | Performed by: ORTHOPAEDIC SURGERY

## 2019-02-27 PROCEDURE — 31575 DIAGNOSTIC LARYNGOSCOPY: CPT | Mod: PBBFAC,PN | Performed by: ORTHOPAEDIC SURGERY

## 2019-02-27 PROCEDURE — 31575 DIAGNOSTIC LARYNGOSCOPY: CPT | Mod: S$PBB,,, | Performed by: ORTHOPAEDIC SURGERY

## 2019-02-27 PROCEDURE — 99999 PR PBB SHADOW E&M-EST. PATIENT-LVL IV: CPT | Mod: PBBFAC,,, | Performed by: ORTHOPAEDIC SURGERY

## 2019-02-27 PROCEDURE — 99204 PR OFFICE/OUTPT VISIT, NEW, LEVL IV, 45-59 MIN: ICD-10-PCS | Mod: 25,S$PBB,, | Performed by: ORTHOPAEDIC SURGERY

## 2019-02-27 PROCEDURE — 99204 OFFICE O/P NEW MOD 45 MIN: CPT | Mod: 25,S$PBB,, | Performed by: ORTHOPAEDIC SURGERY

## 2019-02-27 PROCEDURE — 99214 OFFICE O/P EST MOD 30 MIN: CPT | Mod: PBBFAC,PN,25 | Performed by: ORTHOPAEDIC SURGERY

## 2019-02-27 NOTE — Clinical Note
When I saw this lady yesterday it was crazy, and I did not order an MRI as I had intended- please call her and schedule MRI on the SAME DAY as her upcoming swallow study, she is driving from [place] [employer].  Thanks.

## 2019-02-27 NOTE — Clinical Note
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  I think this is more likely to be a neurological issue, I would not think that MCTD would cause these issues- thanks!

## 2019-02-27 NOTE — PROGRESS NOTES
Subjective:       Patient ID: Devi Fernandes is a 68 y.o. female.    Chief Complaint: Other (scope eval.)    Ms. Fernandes is a very pleasant 69 yo female here to see me today with complaints of persistent hoarseness, difficulty swallowing, choking (worse with solid food), bilateral ear pain ( R>L). She has a complex medical history including connective tissue disorder (followed by doctor in [place]) and saw Dr. Thurman last month. She has  had vocal cord surgery (nodules, she says it was only on one vocal cord initially, and then on recheck she was told it was on both vocal cords)  and ear tube placement in 2017 in [place].  PCP referred her to [facility] but they were not accepting patients and recommended [facility] in [facility] but was able to get in here to see us sooner.  Her initial diagnosis was made by an outside rheumatologist after musculoskeletal complaints, Raynaud's phenomenon, sicca symptoms, positive autoantibodies, chronic dyspnea.  Further workup revealed restrictive lung pattern ILD and pulmonary hypertension by catheterization.  She has being on chronic immunosuppression with prednisone currently on 7.5 mg, from 40mg.  Was also started on azathioprine a steroid sparring agent for ILD.  Patient has developed severe GERD and odynophagia.  With regards to her voice, she says that has had vocal issues for the last two years.  She has noted food coming out of her nose with swallowing, she says that it happens several times daily.  She also has significant coughing and choking with swallowing, worse with liquids.  She has had a swallow study done, at [facility] in [place], about 18 months ago, was not having nasal regurgitation at that time.      Review of Systems   Constitutional: Negative for chills, fatigue, fever and unexpected weight change.   HENT: Positive for congestion, ear pain, tinnitus (pulsatile at times), trouble swallowing and voice change. Negative for dental problem, ear  discharge, facial swelling, hearing loss, nosebleeds, postnasal drip, rhinorrhea, sinus pressure, sneezing and sore throat.    Eyes: Negative for redness, itching and visual disturbance.   Respiratory: Negative for cough, choking, shortness of breath and wheezing.    Cardiovascular: Negative for chest pain and palpitations.   Gastrointestinal: Negative for abdominal pain.        No reflux.   Musculoskeletal: Negative for gait problem.   Skin: Negative for rash.   Neurological: Positive for light-headedness and headaches. Negative for dizziness.       Objective:      Physical Exam   Constitutional: She is oriented to person, place, and time. She appears well-developed and well-nourished. No distress.   HENT:   Head: Normocephalic and atraumatic.   Right Ear: Tympanic membrane and external ear normal. A foreign body (extruded PET in ear canal) is present. Tympanic membrane is not perforated. No middle ear effusion.   Left Ear: Tympanic membrane, external ear and ear canal normal. Tympanic membrane is not perforated.  No middle ear effusion.   Nose: Nose normal. No mucosal edema, rhinorrhea, nasal deformity or septal deviation. No epistaxis. Right sinus exhibits no maxillary sinus tenderness and no frontal sinus tenderness. Left sinus exhibits no maxillary sinus tenderness and no frontal sinus tenderness.   Mouth/Throat: Uvula is midline, oropharynx is clear and moist and mucous membranes are normal. Mucous membranes are not pale and not dry. No dental caries. No oropharyngeal exudate or posterior oropharyngeal erythema.   Extreme hypernasality, loss of gag reflex   Eyes: Conjunctivae, EOM and lids are normal. Pupils are equal, round, and reactive to light. Right eye exhibits no chemosis. Left eye exhibits no chemosis. Right conjunctiva is not injected. Left conjunctiva is not injected. No scleral icterus. Right eye exhibits normal extraocular motion and no nystagmus. Left eye exhibits normal extraocular motion and no  nystagmus.   Neck: Trachea normal and phonation normal. No tracheal tenderness present. No tracheal deviation present. No thyroid mass and no thyromegaly present.   Cardiovascular: Intact distal pulses.   Pulmonary/Chest: Effort normal. No stridor. No respiratory distress.   Abdominal: She exhibits no distension.   Lymphadenopathy:        Head (right side): No submental, no submandibular, no preauricular, no posterior auricular and no occipital adenopathy present.        Head (left side): No submental, no submandibular, no preauricular, no posterior auricular and no occipital adenopathy present.     She has no cervical adenopathy.   Neurological: She is alert and oriented to person, place, and time. No cranial nerve deficit.   Skin: Skin is warm and dry. No rash noted. No erythema.   Psychiatric: She has a normal mood and affect. Her behavior is normal.       Due to indication in patient's history, presentation or risk factors,  a fiber optic exam was performed.    SEPARATE PROCEDURE NOTE:    ANESTHESIA:  Topical xylocaine with wesley-synephrine  FINDINGS:  Extreme pooling of secretions around the glottis, visible aspiration on exam      PROCEDURE:  After verbal consent was obtained, the flexible scope was passed through the patient's nasal cavity without difficulty.  The nasopharynx (adenoid pad) and eustachian tube orifices were first visualized and were found to be normal, without masses or irregularity.  The posterior pharyngeal wall and base of tongue were then examined and no mass or irregular tissue was seen.  The scope was then advanced to the larynx, and the epiglottis, valleculae, and piriform sinuses were normal, without masses or mucosal irregularity.  The false vocal folds and true vocal folds were then examined and were found to have normal mobility (full abduction and adduction) and no masses or mucosal irregularity was seen.  The arytenoids and the interarytenoid area were normal.  She had copious  pooling of secretions surrounding her glottis, and had no cough reflex even with visualized aspiration of secretions.      Old records reviewed, but only audiogram sent over, slight neural loss in the mid-tones only        Assessment:       1. Cranial nerve palsy    2. Velopharyngeal insufficiency, acquired    3. Dysphonia    4. Dysphagia, unspecified type    5. Soft palate paralysis        Plan:       1.  VPI, soft palate paralysis:  Discussed with patient at great length that her dysphonia is not related to nodules on her vocal cords as she has been previously told.  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  This is the cause for her dysphonia, as she is having extreme hypernasality to her voice.  On her scope, she had copious secretions pooling around her glottis also suggestion that she has had a loss of sensation in the area.  I would recommend an MRI of the brain and skull base ASAP as well as a swallow study.  Discussed with patient that I am concerned she has an underlying neurological disorder that has not yet been diagnosed.  Will try and coordinate her care as much as possible to decrease her driving, is coming from [facility].

## 2019-02-28 ENCOUNTER — TELEPHONE (OUTPATIENT)
Dept: OTOLARYNGOLOGY | Facility: CLINIC | Age: 69
End: 2019-02-28

## 2019-02-28 NOTE — TELEPHONE ENCOUNTER
Mely was able to get the swallow study booked for Tuesday, March 5th.  She was told by radiology scheduling that the first available at [facility] [place] [place] is in 2 weeks.  Mely called [facility] and they could not do the scan on the 5th.  They booked her for her MRI in [place] the same day.

## 2019-02-28 NOTE — TELEPHONE ENCOUNTER
----- Message from Shalini Andujar MD sent at 2/28/2019  8:31 AM CST -----  When I saw this lady yesterday it was crazy, and I did not order an MRI as I had intended- please call her and schedule MRI on the SAME DAY as her upcoming swallow study, she is driving from [employer].  Thanks.

## 2019-03-01 RX ORDER — AZATHIOPRINE 50 MG/1
50 TABLET ORAL 2 TIMES DAILY
Qty: 60 TABLET | Refills: 2 | Status: SHIPPED | OUTPATIENT
Start: 2019-03-01 | End: 2019-05-31

## 2019-03-07 ENCOUNTER — HOSPITAL ENCOUNTER (OUTPATIENT)
Dept: RADIOLOGY | Facility: HOSPITAL | Age: 69
Discharge: HOME | End: 2019-03-07
Attending: ORTHOPAEDIC SURGERY
Payer: MEDICARE

## 2019-03-07 ENCOUNTER — TELEPHONE (OUTPATIENT)
Dept: RHEUMATOLOGY | Facility: CLINIC | Age: 69
End: 2019-03-07

## 2019-03-07 DIAGNOSIS — R13.10 DYSPHAGIA, UNSPECIFIED TYPE: ICD-10-CM

## 2019-03-07 DIAGNOSIS — R49.0 DYSPHONIA: ICD-10-CM

## 2019-03-07 DIAGNOSIS — K13.79 VELOPHARYNGEAL INSUFFICIENCY, ACQUIRED: ICD-10-CM

## 2019-03-07 DIAGNOSIS — G52.9 CRANIAL NERVE PALSY: ICD-10-CM

## 2019-03-07 DIAGNOSIS — K13.79 SOFT PALATE PARALYSIS: ICD-10-CM

## 2019-03-07 PROCEDURE — A9585 GADOBUTROL INJECTION: HCPCS | Mod: PO | Performed by: ORTHOPAEDIC SURGERY

## 2019-03-07 PROCEDURE — 70553 MRI BRAIN STEM W/O & W/DYE: CPT | Mod: TC,PO

## 2019-03-07 PROCEDURE — 74230 X-RAY XM SWLNG FUNCJ C+: CPT | Mod: TC

## 2019-03-07 PROCEDURE — 92611 MOTION FLUOROSCOPY/SWALLOW: CPT

## 2019-03-07 PROCEDURE — 70543 MRI ORBT/FAC/NCK W/O &W/DYE: CPT | Mod: 26,,, | Performed by: RADIOLOGY

## 2019-03-07 PROCEDURE — 70543 MRI SOFT TISSUE NECK W W/O CONTRAST: ICD-10-PCS | Mod: 26,,, | Performed by: RADIOLOGY

## 2019-03-07 PROCEDURE — 70553 MRI BRAIN STEM W/O & W/DYE: CPT | Mod: 26,,, | Performed by: RADIOLOGY

## 2019-03-07 PROCEDURE — 70553 MRI BRAIN W WO CONTRAST: ICD-10-PCS | Mod: 26,,, | Performed by: RADIOLOGY

## 2019-03-07 PROCEDURE — 70543 MRI ORBT/FAC/NCK W/O &W/DYE: CPT | Mod: TC,PO

## 2019-03-07 PROCEDURE — 25500020 PHARM REV CODE 255: Mod: PO | Performed by: ORTHOPAEDIC SURGERY

## 2019-03-07 RX ORDER — GADOBUTROL 604.72 MG/ML
6 INJECTION INTRAVENOUS
Status: COMPLETED | OUTPATIENT
Start: 2019-03-07 | End: 2019-03-07

## 2019-03-07 RX ADMIN — GADOBUTROL 6 ML: 604.72 INJECTION INTRAVENOUS at 10:03

## 2019-03-07 NOTE — PROCEDURES
Modified Barium Swallow    Patient Name:  Devi Fernandes   MRN:  [id number]      Recommendations:     Recommendations:                General Recommendations:  Dysphagia therapy  Diet recommendations:   ,   puree/honey  Aspiration Precautions: 1 bite/sip at a time, Alternating bites/sips, Avoid talking while eating, Chin tuck, Double swallow with each bite/sip, HOB to 90 degrees, Meds crushed in puree, Small bites/sips and Strict aspiration precautions   General Precautions: Standard,    Communication strategies:  none    Referral     Reason for Referral  Patient was referred for a Modified Barium Swallow Study to assess the efficiency of his/her swallow function, rule out aspiration and make recommendations regarding safe dietary consistencies, effective compensatory strategies, and safe eating environment.     Diagnosis: <principal problem not specified>       History:     Past Medical History:   Diagnosis Date    Allergy     Aortic regurgitation     Arthritis     Deviated nasal septum     Diabetes mellitus     Dysphagia     GERD (gastroesophageal reflux disease)     Hypertension     PAOLA (obstructive sleep apnea)     Pulmonary fibrosis     Pulmonary hypertension     Pulmonic regurgitation     Restrictive lung disease     Sinus bradycardia     Valvular heart disease        Objective:     Current Respiratory Status:      Alert: yes    Cooperative: yes    Follows Directions: yes    Visualization  · Patient was seen in the lateral view    Oral Peripheral Examination  ·  unremarkable    Consistencies Assessed  · Thin, honey, puree, solid    Oral Preparation/Oral Phase  · WFL- Pt with adequate bolus acceptance, containment, control and timely A-P transfer across consistencies     Pharyngeal Phase   Decreased velo-pharyngeal closure  Decreased tongue base retraction  Aspiration of thin  Max residue in valleculae and pyriform sinuses which doesn't completely clear with multiple swallows.     Cervical  Esophageal Phase  · UES appeared to accommodate all bolus types without stasis or retrograde movement observed     Assessment:     Impressions  ·  Pt presents with severe oropharyngeal dysphagia characterized by decreased velo-pharyngeal closure and decreased tongue base retraction resulting in aspiration of thin liquids and max pharyngeal residue which does not completely clear placing her at risk of aspiration. Chin tuck improved clearance of bolus.     Prognosis: Good    Barriers:  · None    Plan  Speech therapy  Modified diet  Compensatory strategies    Education  Results were discussed with patient.    Goals:       Plan:   · Patient to be seen:      · Plan of Care expires:     · Plan of Care reviewed with:           Discharge recommendations:      Barriers to Discharge:  None    Time Tracking:   SLP Treatment Date:      Speech Start Time:     Speech Stop Time:        Speech Total Time (min):       Danette Bearden CCC-SLP  03/07/2019

## 2019-03-07 NOTE — TELEPHONE ENCOUNTER
Left voice message to return call return call to clinic regarding getting labs done per Dr. Thurman.

## 2019-03-07 NOTE — TELEPHONE ENCOUNTER
----- Message from Zachariah Thurman MD sent at 3/7/2019 11:42 AM CST -----  Regarding: Bloodwork recommended  Please contact patient and inform will recommend to have blood-work performed, specifically muscle enzymes and acute phase reactants.  Results may alter current management.  Will follow today's performed MRI, results not available yet.    Thank you

## 2019-03-08 ENCOUNTER — TELEPHONE (OUTPATIENT)
Dept: OTOLARYNGOLOGY | Facility: CLINIC | Age: 69
End: 2019-03-08

## 2019-03-08 DIAGNOSIS — K13.79 VELOPHARYNGEAL INSUFFICIENCY, ACQUIRED: Primary | ICD-10-CM

## 2019-03-08 DIAGNOSIS — R13.10 DYSPHAGIA, UNSPECIFIED TYPE: ICD-10-CM

## 2019-03-08 DIAGNOSIS — R49.0 DYSPHONIA: ICD-10-CM

## 2019-03-08 DIAGNOSIS — G52.9 CRANIAL NERVE PALSY: Primary | ICD-10-CM

## 2019-03-08 DIAGNOSIS — K13.79 VELOPHARYNGEAL INSUFFICIENCY, ACQUIRED: ICD-10-CM

## 2019-03-08 DIAGNOSIS — K13.79 SOFT PALATE PARALYSIS: ICD-10-CM

## 2019-03-08 NOTE — TELEPHONE ENCOUNTER
I called and reviewed results of MRI Brain and MRI Neck with patient.  Both are negative.  Recommend referral to Neurology (nima martins).  Also discussed swallow study results and referral to .  She lives in [place], [place] and prefers location there.

## 2019-03-08 NOTE — TELEPHONE ENCOUNTER
Please let patient know that her swallow study showed aspiration of thins and she is at risk of aspiration.  Speech therapist Recommends:                  General Recommendations:  Dysphagia therapy with ST  Diet recommendations:   puree/honey  Aspiration Precautions: 1 bite/sip at a time, Alternating bites/sips, Avoid talking while eating, Chin tuck, Double swallow with each bite/sip, HOB to 90 degrees, Meds crushed in puree, Small bites/sips and Strict aspiration precautions     She lives out of town; not sure where she would like to do ST.  I'll place external referral.

## 2019-03-11 ENCOUNTER — TELEPHONE (OUTPATIENT)
Dept: OTOLARYNGOLOGY | Facility: CLINIC | Age: 69
End: 2019-03-11

## 2019-03-11 NOTE — TELEPHONE ENCOUNTER
I called and spoke to patient to let her know results of MRI as well as swallow studies.  We discussed that her MRI is negative, and the next step is to have her see Neurology.  They will be contacting her for an appointment in the near future.  She may also benefit from speech therapy for her dysphagia, but will await her evaluation with neurology first (and she would also likely want to do closer to home).

## 2019-03-12 ENCOUNTER — TELEPHONE (OUTPATIENT)
Dept: OTOLARYNGOLOGY | Facility: CLINIC | Age: 69
End: 2019-03-12

## 2019-03-12 PROBLEM — M35.1 MIXED CONNECTIVE TISSUE DISEASE: Status: ACTIVE | Noted: 2019-03-12

## 2019-03-12 PROBLEM — R74.8 ELEVATED CK: Status: ACTIVE | Noted: 2019-03-12

## 2019-03-12 PROBLEM — R49.0 DYSPHONIA: Status: ACTIVE | Noted: 2019-03-12

## 2019-03-12 RX ORDER — HEPARIN 100 UNIT/ML
500 SYRINGE INTRAVENOUS
Status: CANCELLED | OUTPATIENT
Start: 2019-03-12

## 2019-03-12 RX ORDER — DIPHENHYDRAMINE HYDROCHLORIDE 50 MG/ML
25 INJECTION INTRAMUSCULAR; INTRAVENOUS
Status: CANCELLED | OUTPATIENT
Start: 2019-03-12

## 2019-03-12 RX ORDER — SODIUM CHLORIDE 0.9 % (FLUSH) 0.9 %
10 SYRINGE (ML) INJECTION
Status: CANCELLED | OUTPATIENT
Start: 2019-03-12

## 2019-03-12 RX ORDER — DIPHENHYDRAMINE HCL 25 MG
25 CAPSULE ORAL
Status: CANCELLED | OUTPATIENT
Start: 2019-03-12

## 2019-03-12 RX ORDER — ACETAMINOPHEN 500 MG
500 TABLET ORAL
Status: CANCELLED | OUTPATIENT
Start: 2019-03-12

## 2019-03-12 NOTE — TELEPHONE ENCOUNTER
----- Message from Argenis Morrell sent at 3/12/2019  3:48 PM CDT -----  Type:  Needs Medical Advice    Who Called: pt Acqumadantta  Symptoms (please be specific):  How long has patient had these symptoms:   Pharmacy name and phone #:   Would the patient rather a call back or a response via [facility]?  Call back  Best Call Back Number: [phone]   Additional Information: States she is calling to ask Dr Andujar to recommend a good Cardiologist here at [facility] for her//please call//thanks//[facility]

## 2019-03-14 ENCOUNTER — TELEPHONE (OUTPATIENT)
Dept: RHEUMATOLOGY | Facility: CLINIC | Age: 69
End: 2019-03-14

## 2019-03-14 NOTE — TELEPHONE ENCOUNTER
----- Message from Alyson Nicholas sent at 3/14/2019 11:03 AM CDT -----  Contact: dr [phone]  Would like to consult with the nurse, patients has some question and her dr would like to speak with the nurse concerning patients question, please call back at [phone], thanks sj

## 2019-03-18 ENCOUNTER — LAB VISIT (OUTPATIENT)
Dept: LAB | Facility: HOSPITAL | Age: 69
End: 2019-03-18
Attending: PSYCHIATRY & NEUROLOGY
Payer: MEDICARE

## 2019-03-18 ENCOUNTER — OFFICE VISIT (OUTPATIENT)
Dept: NEUROLOGY | Facility: CLINIC | Age: 69
End: 2019-03-18
Payer: MEDICARE

## 2019-03-18 VITALS
DIASTOLIC BLOOD PRESSURE: 60 MMHG | HEIGHT: 65 IN | BODY MASS INDEX: 23.07 KG/M2 | HEART RATE: 80 BPM | WEIGHT: 138.44 LBS | SYSTOLIC BLOOD PRESSURE: 114 MMHG

## 2019-03-18 DIAGNOSIS — R47.1 DYSARTHRIA: Primary | ICD-10-CM

## 2019-03-18 DIAGNOSIS — R13.10 DYSPHAGIA, UNSPECIFIED TYPE: ICD-10-CM

## 2019-03-18 DIAGNOSIS — R49.0 DYSPHONIA: ICD-10-CM

## 2019-03-18 DIAGNOSIS — M35.1 MIXED CONNECTIVE TISSUE DISEASE: ICD-10-CM

## 2019-03-18 DIAGNOSIS — R47.1 DYSARTHRIA: ICD-10-CM

## 2019-03-18 PROCEDURE — 99212 OFFICE O/P EST SF 10 MIN: CPT | Mod: PBBFAC | Performed by: PSYCHIATRY & NEUROLOGY

## 2019-03-18 PROCEDURE — 99204 PR OFFICE/OUTPT VISIT, NEW, LEVL IV, 45-59 MIN: ICD-10-PCS | Mod: S$PBB,,, | Performed by: PSYCHIATRY & NEUROLOGY

## 2019-03-18 PROCEDURE — 99999 PR PBB SHADOW E&M-EST. PATIENT-LVL II: CPT | Mod: PBBFAC,,, | Performed by: PSYCHIATRY & NEUROLOGY

## 2019-03-18 PROCEDURE — 36415 COLL VENOUS BLD VENIPUNCTURE: CPT

## 2019-03-18 PROCEDURE — 99204 OFFICE O/P NEW MOD 45 MIN: CPT | Mod: S$PBB,,, | Performed by: PSYCHIATRY & NEUROLOGY

## 2019-03-18 PROCEDURE — 99999 PR PBB SHADOW E&M-EST. PATIENT-LVL II: ICD-10-PCS | Mod: PBBFAC,,, | Performed by: PSYCHIATRY & NEUROLOGY

## 2019-03-18 NOTE — LETTER
March 18, 2019      Shailni Andujar MD  [id number] [place] [address] [address] [id number]           [facility]  [address]  [place] [address]  Phone: [phone]  Fax: [phone]          Patient: Devi Fernandes   MR Number: [id number]   YOB: 1950   Date of Visit: 3/18/2019       Dear Dr. Shalini Andujar:    Thank you for referring Devi Fernandes to me for evaluation. Attached you will find relevant portions of my assessment and plan of care.    If you have questions, please do not hesitate to call me. I look forward to following Devi Fernandes along with you.    Sincerely,    Keny Santos MD    Enclosure  CC:  No Recipients    If you would like to receive this communication electronically, please contact [email or web address] or [phone] to request more information on [email or web address] Link access.    For providers and/or their staff who would like to refer a patient to [facility], please contact us through our one-stop-shop provider referral line, [facility], at [phone].    If you feel you have received this communication in error or would no longer like to receive these types of communications, please e-mail [email or web address]

## 2019-03-18 NOTE — PROGRESS NOTES
Subjective:      Patient ID: Devi Fernandes is a 68 y.o. female.    Chief Complaint:  I have difficulty with my voice and difficulty with swallowing    This right-handed patient indicates that since approximately 2016, she has noted a   Change in her voice described as hoarseness. The patient then began a very long and continued workup for the same problem which has been persistent.  The patient brings with her a copy of her doctor visits that began in May of 2017 when she was seen by a neurologist followed by multiple visits with ENT and other of physicians.  As part of the workup for the hoarseness, the patient apparently had an EMG done which was described by the patient as placing needles in the cheek and oral musculature.  She has had endoscopy done on multiple occasions.  The patient states that she has even had a muscle biopsy performed.    In August of 2017, the patient had surgery for vocal cord cyst and it placement of tubes in her ears.  The patient is of the opinion that following that surgical procedure, she began to have some difficulty with swallowing and her hoarseness was persistent.  Because of the persistent symptoms, in December, 2017, she began treatment for acid reflux but did not feel that there was a significant improvement in her hoarseness.  However, she has at the same time began to experience other symptoms and has been actively followed by Rheumatology for a connective tissue disorder.  She has had extensive testing for the connective tissue disorder as well.  The patient is currently on Imuran and has been on varying doses of prednisone throughout this same time frame.    Since December, 2018, she has had increasing difficulty with swallowing.  However the patient states that the symptoms do not appear to hear to be progressive.  The difficulty swallowing has consisted of threatened aspiration which has been documented by a speech therapy evaluation including a video swallow.   She also has had regurgitation of food and liquid through her nose.    Generally speaking, the symptoms do not worsen or fatigue as the day progresses.  She appears to have the same group of symptoms throughout the day including the dysphonia as well as dysphagia.  She has never had any symptoms of ptosis or diplopia.  She denies any blurred vision.  She is not aware of it any extremity fatigue and continues to exercise on a regular basis.  She does have mild dyspnea on exertion particularly when going up steps but not when walking at a regular speed.          ROS:  GENERAL: NO FEVER, CHILLS,  OR WEIGHT LOSS.  SKIN: NO RASHES, ITCHING OR CHANGES IN COLOR OR TEXTURE OF SKIN.  HEAD: NO HEADACHES OR RECENT HEAD TRAUMA.  EYES: VISUAL ACUITY FINE. NO PHOTOPHOBIA, OCULAR PAIN OR DIPLOPIA.  EARS: DENIES EAR PAIN, DISCHARGE OR VERTIGO.  NOSE: NO LOSS OF SMELL, NO EPISTAXIS OR POSTNASAL DRIP.  MOUTH & THROAT: NO HOARSENESS OR CHANGE IN VOICE. NO EXCESSIVE GUM BLEEDING.  NODES: DENIES SWOLLEN GLANDS.  CHEST: DENIES CYANOSIS, WHEEZING  OR CHEST PAIN  CARDIOVASCULAR: DENIES PND, ORTHOPNEA OR REDUCED EXERCISE TOLERANCE.  ABDOMEN: APPETITE FINE. NO WEIGHT LOSS. DENIES DIARRHEA, ABDOMINAL PAIN, HEMATEMESIS OR BLOOD IN STOOL.  URINARY: NO FLANK PAIN, DYSURIA OR HEMATURIA.  PERIPHERAL VASCULAR: NO CLAUDICATION OR CYANOSIS.  MUSCULOSKELETAL:   THE PATIENT REPORTS VARYING DEGREES OF JOINT STIFFNESS AND JOINT PAIN IN MULTIPLE AREAS.  NEUROLOGIC: NO HISTORY OF SEIZURES, PARALYSIS, ALTERATION OF GAIT OR COORDINATION.    Past Medical History:   Diagnosis Date    Allergy     Aortic regurgitation     Arthritis     Deviated nasal septum     Diabetes mellitus     Dysphagia     GERD (gastroesophageal reflux disease)     Hypertension     PAOLA (obstructive sleep apnea)     Pulmonary fibrosis     Pulmonary hypertension     Pulmonic regurgitation     Restrictive lung disease     Sinus bradycardia     Valvular heart disease      Past  Surgical History:   Procedure Laterality Date    APPENDECTOMY       SECTION      COLONOSCOPY  2011    ESOPHAGEAL MANOMETRY  2018    GASTROSCOPY  02/15/2018     Family History   Problem Relation Age of Onset    Cancer Mother     Diabetes Mellitus Father      Social History     Socioeconomic History    Marital status:      Spouse name: Not on file    Number of children: Not on file    Years of education: Not on file    Highest education level: Not on file   Social Needs    Financial resource strain: Not on file    Food insecurity - worry: Not on file    Food insecurity - inability: Not on file    Transportation needs - medical: Not on file    Transportation needs - non-medical: Not on file   Occupational History    Not on file   Tobacco Use    Smoking status: Never Smoker    Smokeless tobacco: Never Used   Substance and Sexual Activity    Alcohol use: No     Frequency: Never    Drug use: No    Sexual activity: No   Other Topics Concern    Not on file   Social History Narrative    Not on file         Objective:   PE:   VITAL SIGNS: HEIGHT 5 FT 5 IN, WEIGHT 62.8 KG, BMI 23 0.04   Vitals:    19 1055   BP: 114/60   Pulse: 80     APPEARANCE: WELL NOURISHED, WELL DEVELOPED, IN NO ACUTE DISTRESS.    HEAD: NORMOCEPHALIC, ATRAUMATIC.  EYES: PERRL. EOMI.  NON-ICTERIC SCLERAE.    NO PTOSIS IS SEEN EVEN WITH PROLONGED SUSTAINED UPWARD GAZE  EARS: TM'S INTACT.   NOSE: MUCOSA PINK. AIRWAY CLEAR.  MOUTH & THROAT: NO TONSILLAR ENLARGEMENT. NO PHARYNGEAL ERYTHEMA OR EXUDATE.  DYSPHONIA IS VERY OBVIOUS.  HOWEVER SHE DOES HAVE PALATAL MOVEMENT WITH PHONATION.  NECK: SUPPLE. NO BRUITS.  CHEST: LUNGS CLEAR TO AUSCULTATION.  CARDIOVASCULAR: REGULAR RHYTHM WITHOUT SIGNIFICANT MURMURS.  ABDOMEN: BOWEL SOUNDS NORMAL. NOT DISTENDED.   MUSCULOSKELETAL:  NO BONY DEFORMITY SEEN.  MUSCLE TONE IS NORMAL.  MUSCLE MASS IS NORMAL.    NEUROLOGIC:   MENTAL STATUS:  THE PATIENT IS WELL ORIENTED TO  PERSON, TIME, PLACE, AND SITUATION.  THE PATIENT IS ATTENTIVE TO THE ENVIRONMENT AND COOPERATIVE FOR THE EXAM.  CRANIAL NERVES: II-XII GROSSLY INTACT. FUNDOSCOPIC EXAM IS NORMAL.  NO HEMORRHAGE, EXUDATE OR PAPILLEDEMA IS PRESENT. THE EXTRAOCULAR MUSCLES ARE INTACT IN THE CARDINAL DIRECTIONS OF GAZE.  NO PTOSIS IS PRESENT.  FACIAL SENSATION IS INTACT TO COTTON WISP ALL 3 DIVISIONS BILATERALLY. FACIAL FEATURES ARE SYMMETRICAL.  SPEECH IS  DYSPHONIC WITH A VERY STRONG NASAL QUALITY.   GAG REFLEX IS  INTACT BILATERALLY.TONGUE PROTRUDES IN THE MIDLINE.    GAIT AND STATION:  ROMBERG IS NEGATIVE.  GOOD ALTERNATE ARMSWING WITH NORMAL GAIT.  MOTOR:  NO DOWNDRIFT OF EITHER ARM WHEN HELD AT SHOULDER LEVEL.  MANUAL MUSCLE TESTING OF PROXIMAL AND DISTAL MUSCLES OF BOTH UPPER AND LOWER EXTREMITIES IS NORMAL.   NO OBVIOUS MUSCLE FATIGUE IS SEEN WITH REPETITIVE HAND SQUEEZING.  SENSORY:  INTACT BOTH UPPER AND LOWER EXTREMITIES TO PIN PRICK, TOUCH, AND VIBRATION.  CEREBELLAR:  FINGER TO NOSE DONE WELL.  ALTERNATING MOVEMENTS INTACT.  NO INVOLUNTARY MOVEMENTS OR TREMOR SEEN.  REFLEXES:  STRETCH REFLEXES ARE 2+ BOTH UPPER AND LOWER EXTREMITIES.  PLANTAR STIMULATION IS FLEXOR BILATERALLY AND NO PATHOLOGICAL REFLEXES ARE SEEN              Assessment:     Encounter Diagnoses   Name Primary?    Dysarthria Yes    Dysphonia     Mixed connective tissue disease     Dysphagia, unspecified type        DISCUSSION  The patient presents a very complex picture with extensive diagnostic workup done by multiple physicians.  She continues to exhibit her dysphonia and now has a degree of dysphagia as well.  Review of her MRI of the brain and soft tissue of the neck was carried out while in the office.  Review of multiple laboratories tests was also done.  Etiology of this syndrome is not clear at this time although the differential diagnosis should probably include myasthenia gravis.  With her history of a connective tissue disorder, the  possibility of other autoimmune illness such as myasthenia should be in the differential diagnosis.  This does not appear to be a generalized myasthenia illness.  Further, I am not certain how to interpret the history given by the patient indicating that her symptoms began after a surgical approach to vocal cord nodules was done.              Plan:     1.  Lab today: Myasthenia panel  2.  Depending upon results of myasthenia panel, further recommendations may be made.    This was a 60 min visit with the patient with over 50% of the time spent evaluating the patient's history and counseling the patient regarding the differential diagnosis.  This note is generated with speech recognition software and is subject to transcription error and sound alike phrases that may be missed by proofreading.

## 2019-04-01 LAB — MUSK ANTIBODY TEST: 0 NMOL/L (ref 0–0.02)

## 2019-04-02 ENCOUNTER — TELEPHONE (OUTPATIENT)
Dept: NEUROLOGY | Facility: CLINIC | Age: 69
End: 2019-04-02

## 2019-04-02 LAB
ACHR BIND AB SER-SCNC: 0 NMOL/L
ACHR MOD AB/ACHR TOTAL SFR SER: 0 %
MG INTERPRETIVE COMMENTS: NORMAL
STRIA MUS AB TITR SER: NEGATIVE TITER

## 2019-04-02 NOTE — TELEPHONE ENCOUNTER
----- Message from Keny Santos MD sent at 4/2/2019  8:29 AM CDT -----  All of the tests for myasthenia gravis are negative.

## 2019-04-03 ENCOUNTER — INFUSION (OUTPATIENT)
Dept: RHEUMATOLOGY | Facility: HOSPITAL | Age: 69
End: 2019-04-03
Attending: INTERNAL MEDICINE
Payer: MEDICARE

## 2019-04-03 ENCOUNTER — DOCUMENTATION ONLY (OUTPATIENT)
Dept: HEMATOLOGY/ONCOLOGY | Facility: CLINIC | Age: 69
End: 2019-04-03

## 2019-04-03 VITALS
RESPIRATION RATE: 16 BRPM | DIASTOLIC BLOOD PRESSURE: 65 MMHG | SYSTOLIC BLOOD PRESSURE: 115 MMHG | OXYGEN SATURATION: 97 % | BODY MASS INDEX: 23.15 KG/M2 | TEMPERATURE: 98 F | HEART RATE: 75 BPM | WEIGHT: 139.13 LBS

## 2019-04-03 DIAGNOSIS — R74.8 ELEVATED CK: Primary | ICD-10-CM

## 2019-04-03 DIAGNOSIS — M35.1 MIXED CONNECTIVE TISSUE DISEASE: ICD-10-CM

## 2019-04-03 DIAGNOSIS — R49.0 DYSPHONIA: ICD-10-CM

## 2019-04-03 PROCEDURE — 63600175 PHARM REV CODE 636 W HCPCS: Mod: JG | Performed by: INTERNAL MEDICINE

## 2019-04-03 PROCEDURE — 96366 THER/PROPH/DIAG IV INF ADDON: CPT

## 2019-04-03 PROCEDURE — 25000003 PHARM REV CODE 250: Performed by: INTERNAL MEDICINE

## 2019-04-03 PROCEDURE — 96365 THER/PROPH/DIAG IV INF INIT: CPT

## 2019-04-03 PROCEDURE — 96375 TX/PRO/DX INJ NEW DRUG ADDON: CPT

## 2019-04-03 RX ORDER — ACETAMINOPHEN 500 MG
500 TABLET ORAL
Status: CANCELLED | OUTPATIENT
Start: 2019-05-01

## 2019-04-03 RX ORDER — HEPARIN 100 UNIT/ML
500 SYRINGE INTRAVENOUS
Status: CANCELLED | OUTPATIENT
Start: 2019-05-01

## 2019-04-03 RX ORDER — DIPHENHYDRAMINE HCL 25 MG
25 CAPSULE ORAL
Status: DISCONTINUED | OUTPATIENT
Start: 2019-04-03 | End: 2019-04-03

## 2019-04-03 RX ORDER — DIPHENHYDRAMINE HYDROCHLORIDE 50 MG/ML
25 INJECTION INTRAMUSCULAR; INTRAVENOUS
Status: CANCELLED | OUTPATIENT
Start: 2019-05-01

## 2019-04-03 RX ORDER — ACETAMINOPHEN 500 MG
500 TABLET ORAL
Status: DISCONTINUED | OUTPATIENT
Start: 2019-04-03 | End: 2019-04-03 | Stop reason: HOSPADM

## 2019-04-03 RX ORDER — SODIUM CHLORIDE 0.9 % (FLUSH) 0.9 %
10 SYRINGE (ML) INJECTION
Status: CANCELLED | OUTPATIENT
Start: 2019-05-01

## 2019-04-03 RX ORDER — DIPHENHYDRAMINE HYDROCHLORIDE 50 MG/ML
25 INJECTION INTRAMUSCULAR; INTRAVENOUS
Status: DISCONTINUED | OUTPATIENT
Start: 2019-04-03 | End: 2019-04-03 | Stop reason: HOSPADM

## 2019-04-03 RX ORDER — DIPHENHYDRAMINE HCL 25 MG
25 CAPSULE ORAL
Status: CANCELLED | OUTPATIENT
Start: 2019-05-01

## 2019-04-03 RX ORDER — SODIUM CHLORIDE 0.9 % (FLUSH) 0.9 %
10 SYRINGE (ML) INJECTION
Status: DISCONTINUED | OUTPATIENT
Start: 2019-04-03 | End: 2019-04-03 | Stop reason: HOSPADM

## 2019-04-03 RX ADMIN — ACETAMINOPHEN 500 MG: 500 TABLET ORAL at 10:04

## 2019-04-03 RX ADMIN — HUMAN IMMUNOGLOBULIN G 65 G: 40 LIQUID INTRAVENOUS at 10:04

## 2019-04-03 RX ADMIN — DIPHENHYDRAMINE HYDROCHLORIDE 25 MG: 50 INJECTION INTRAMUSCULAR; INTRAVENOUS at 10:04

## 2019-04-03 NOTE — PLAN OF CARE
Problem: Adult Inpatient Plan of Care  Goal: Plan of Care Review  Outcome: Ongoing (interventions implemented as appropriate)  Plan of care discussed with patient, and patient verbalized understanding.

## 2019-04-03 NOTE — PROGRESS NOTES
SW met with pt at request of infusion nurse to assist with lodging request. Pt reported she is from [place] and she needs to come back to the clinic tomorrow for her second infusion. Pt asked Sw for help locating a hotel that was nearby as she was not up for driving home and back for tomorrow. Sw contacted a couple of hotels near the facility and asked for their medical rates. SW presented options to pt. Pt stated she will contact hotel for reservation. SW provided her contact to pt should further needs arise. SW will f/u as requested.

## 2019-04-03 NOTE — NURSING
Infusion# 1 of 2 cycle 1  S/S infection noted or voiced? Denied/none noted   Recent labs? 3/18/19    Premeds? Tylenol 500 mg po and Benadryl 25 mg IVP over 3 min    IVIG/Privigen 1 Gm/Kg= 65 Gm administered IV and titrated per protocol.  Infused at 19 ml/hr x 30 min, increased to 38 ml/hr X 30 minutes, increased to 76 ml/hr X 30 minutes, and completed at 151 ml/hr ; see MAR & vitals for details.   Tolerated well without adverse events, discharged ambulatory out of clinic.

## 2019-04-04 ENCOUNTER — INFUSION (OUTPATIENT)
Dept: RHEUMATOLOGY | Facility: HOSPITAL | Age: 69
End: 2019-04-04
Attending: INTERNAL MEDICINE
Payer: MEDICARE

## 2019-04-04 VITALS
SYSTOLIC BLOOD PRESSURE: 136 MMHG | TEMPERATURE: 99 F | OXYGEN SATURATION: 94 % | WEIGHT: 139.13 LBS | DIASTOLIC BLOOD PRESSURE: 68 MMHG | RESPIRATION RATE: 18 BRPM | BODY MASS INDEX: 23.15 KG/M2 | HEART RATE: 72 BPM

## 2019-04-04 DIAGNOSIS — R74.8 ELEVATED CK: Primary | ICD-10-CM

## 2019-04-04 DIAGNOSIS — M35.1 MIXED CONNECTIVE TISSUE DISEASE: ICD-10-CM

## 2019-04-04 DIAGNOSIS — R49.0 DYSPHONIA: ICD-10-CM

## 2019-04-04 PROCEDURE — 96366 THER/PROPH/DIAG IV INF ADDON: CPT

## 2019-04-04 PROCEDURE — 25000003 PHARM REV CODE 250: Performed by: INTERNAL MEDICINE

## 2019-04-04 PROCEDURE — 96375 TX/PRO/DX INJ NEW DRUG ADDON: CPT

## 2019-04-04 PROCEDURE — 63600175 PHARM REV CODE 636 W HCPCS: Mod: JG | Performed by: INTERNAL MEDICINE

## 2019-04-04 PROCEDURE — 96365 THER/PROPH/DIAG IV INF INIT: CPT

## 2019-04-04 RX ORDER — ACETAMINOPHEN 500 MG
500 TABLET ORAL
Status: CANCELLED | OUTPATIENT
Start: 2019-05-01

## 2019-04-04 RX ORDER — ACETAMINOPHEN 500 MG
500 TABLET ORAL
Status: DISCONTINUED | OUTPATIENT
Start: 2019-04-04 | End: 2019-04-04 | Stop reason: HOSPADM

## 2019-04-04 RX ORDER — HEPARIN 100 UNIT/ML
500 SYRINGE INTRAVENOUS
Status: CANCELLED | OUTPATIENT
Start: 2019-05-01

## 2019-04-04 RX ORDER — SODIUM CHLORIDE 0.9 % (FLUSH) 0.9 %
10 SYRINGE (ML) INJECTION
Status: CANCELLED | OUTPATIENT
Start: 2019-05-01

## 2019-04-04 RX ORDER — DIPHENHYDRAMINE HYDROCHLORIDE 50 MG/ML
25 INJECTION INTRAMUSCULAR; INTRAVENOUS
Status: CANCELLED | OUTPATIENT
Start: 2019-05-01

## 2019-04-04 RX ORDER — DIPHENHYDRAMINE HYDROCHLORIDE 50 MG/ML
25 INJECTION INTRAMUSCULAR; INTRAVENOUS
Status: DISCONTINUED | OUTPATIENT
Start: 2019-04-04 | End: 2019-04-04 | Stop reason: HOSPADM

## 2019-04-04 RX ORDER — DIPHENHYDRAMINE HCL 25 MG
25 CAPSULE ORAL
Status: CANCELLED | OUTPATIENT
Start: 2019-05-01

## 2019-04-04 RX ORDER — SODIUM CHLORIDE 0.9 % (FLUSH) 0.9 %
10 SYRINGE (ML) INJECTION
Status: DISCONTINUED | OUTPATIENT
Start: 2019-04-04 | End: 2019-04-04 | Stop reason: HOSPADM

## 2019-04-04 RX ADMIN — HUMAN IMMUNOGLOBULIN G 65 G: 40 LIQUID INTRAVENOUS at 08:04

## 2019-04-04 RX ADMIN — ACETAMINOPHEN 500 MG: 500 TABLET ORAL at 08:04

## 2019-04-04 RX ADMIN — DIPHENHYDRAMINE HYDROCHLORIDE 25 MG: 50 INJECTION INTRAMUSCULAR; INTRAVENOUS at 08:04

## 2019-04-04 NOTE — NURSING
Infusion# 2 of 2 cycle 1  S/S infection noted or voiced? Denies/none noted  Recent labs? 3/18/19    Premeds? Tylenol 500 mg po and Benadryl 25 mg IVP over 3 min    IVIG/Privigen 1 Gm/Kg= 65 Gm administered IV and titrated per protocol.  Infused at 19 ml/hr x 30 min, increased to 38 ml/hr X 30 minutes, increased to 76 ml/hr X 30 minutes, and completed at 151 ml/hr ; see MAR & vitals for details.     Tolerated well without adverse events, discharged ambulatory out of clinic.

## 2019-04-23 ENCOUNTER — TELEPHONE (OUTPATIENT)
Dept: RHEUMATOLOGY | Facility: CLINIC | Age: 69
End: 2019-04-23

## 2019-04-23 NOTE — TELEPHONE ENCOUNTER
----- Message from Len Burrell sent at 4/23/2019 12:19 PM CDT -----  Contact: ptg   States she' s calling to resched her infusion to go along with her Dr's appt that was bookedout top 05/21/2019 and can be reached at [phone] or [phone] and also pt wnts to be worked in around the same time as her previous appt //thanks/dbdebi

## 2019-04-23 NOTE — TELEPHONE ENCOUNTER
Call returned to Mrs. Fernandes who states that she will keep infusion appointments scheduled as is and come back to see Dr. Thurman on 5/21/19.

## 2019-04-30 ENCOUNTER — INFUSION (OUTPATIENT)
Dept: RHEUMATOLOGY | Facility: HOSPITAL | Age: 69
End: 2019-04-30
Attending: INTERNAL MEDICINE
Payer: MEDICARE

## 2019-04-30 VITALS
HEART RATE: 103 BPM | SYSTOLIC BLOOD PRESSURE: 130 MMHG | DIASTOLIC BLOOD PRESSURE: 63 MMHG | TEMPERATURE: 97 F | BODY MASS INDEX: 23.59 KG/M2 | WEIGHT: 141.75 LBS | RESPIRATION RATE: 16 BRPM | OXYGEN SATURATION: 99 %

## 2019-04-30 DIAGNOSIS — R49.0 DYSPHONIA: ICD-10-CM

## 2019-04-30 DIAGNOSIS — M35.1 MIXED CONNECTIVE TISSUE DISEASE: ICD-10-CM

## 2019-04-30 DIAGNOSIS — R74.8 ELEVATED CK: Primary | ICD-10-CM

## 2019-04-30 PROCEDURE — 63600175 PHARM REV CODE 636 W HCPCS: Performed by: INTERNAL MEDICINE

## 2019-04-30 PROCEDURE — 96366 THER/PROPH/DIAG IV INF ADDON: CPT

## 2019-04-30 PROCEDURE — 96375 TX/PRO/DX INJ NEW DRUG ADDON: CPT

## 2019-04-30 PROCEDURE — 25000003 PHARM REV CODE 250: Performed by: INTERNAL MEDICINE

## 2019-04-30 PROCEDURE — 96365 THER/PROPH/DIAG IV INF INIT: CPT

## 2019-04-30 RX ORDER — ACETAMINOPHEN 500 MG
500 TABLET ORAL
Status: CANCELLED | OUTPATIENT
Start: 2019-05-28

## 2019-04-30 RX ORDER — HEPARIN 100 UNIT/ML
500 SYRINGE INTRAVENOUS
Status: CANCELLED | OUTPATIENT
Start: 2019-05-28

## 2019-04-30 RX ORDER — DIPHENHYDRAMINE HCL 25 MG
25 CAPSULE ORAL
Status: CANCELLED | OUTPATIENT
Start: 2019-05-28

## 2019-04-30 RX ORDER — SODIUM CHLORIDE 0.9 % (FLUSH) 0.9 %
10 SYRINGE (ML) INJECTION
Status: DISCONTINUED | OUTPATIENT
Start: 2019-04-30 | End: 2019-04-30 | Stop reason: HOSPADM

## 2019-04-30 RX ORDER — SODIUM CHLORIDE 0.9 % (FLUSH) 0.9 %
10 SYRINGE (ML) INJECTION
Status: CANCELLED | OUTPATIENT
Start: 2019-05-28

## 2019-04-30 RX ORDER — DIPHENHYDRAMINE HYDROCHLORIDE 50 MG/ML
25 INJECTION INTRAMUSCULAR; INTRAVENOUS
Status: DISCONTINUED | OUTPATIENT
Start: 2019-04-30 | End: 2019-04-30 | Stop reason: HOSPADM

## 2019-04-30 RX ORDER — DIPHENHYDRAMINE HYDROCHLORIDE 50 MG/ML
25 INJECTION INTRAMUSCULAR; INTRAVENOUS
Status: CANCELLED | OUTPATIENT
Start: 2019-05-28

## 2019-04-30 RX ORDER — ACETAMINOPHEN 500 MG
500 TABLET ORAL
Status: DISCONTINUED | OUTPATIENT
Start: 2019-04-30 | End: 2019-04-30 | Stop reason: HOSPADM

## 2019-04-30 RX ORDER — PREDNISONE 5 MG/1
5 TABLET ORAL DAILY
COMMUNITY
End: 2019-12-10 | Stop reason: SDUPTHER

## 2019-04-30 RX ADMIN — ACETAMINOPHEN 500 MG: 500 TABLET ORAL at 10:04

## 2019-04-30 RX ADMIN — HUMAN IMMUNOGLOBULIN G 65 G: 40 LIQUID INTRAVENOUS at 10:04

## 2019-04-30 RX ADMIN — DIPHENHYDRAMINE HYDROCHLORIDE 25 MG: 50 INJECTION INTRAMUSCULAR; INTRAVENOUS at 10:04

## 2019-04-30 NOTE — DISCHARGE INSTRUCTIONS
FALL PREVENTION   Falls often occur due to slipping, tripping or losing your balance. Here are ways to reduce your risk of falling again.   Was there anything that caused your fall that can be fixed, removed or replaced?   Make your home safe by keeping walkways clear of objects you may trip over.   Use non-slip pads under rugs.   Do not walk in poorly lit areas.   Do not stand on chairs or wobbly ladders.   Use caution when reaching overhead or looking upward. This position can cause a loss of balance.   Be sure your shoes fit properly, have non-slip bottoms and are in good condition.   Be cautious when going up and down stairs, curbs, and when walking on uneven sidewalks.   If your balance is poor, consider using a cane or walker.   If your fall was related to alcohol use, stop or limit alcohol intake.   If your fall was related to use of sleeping medicines, talk to your doctor about this. You may need to reduce your dosage at bedtime if you awaken during the night to go to the bathroom.   To reduce the need for nighttime bathroom trips:   Avoid drinking fluids for several hours before going to bed   Empty your bladder before going to bed   Men can keep a urinal at the bedside   © 4490-6495 [facility], [address] [place] [address], [place] [address]. All rights reserved. This information is not intended as a substitute for professional medical care. Always follow your healthcare professional's instructions.

## 2019-04-30 NOTE — PATIENT INSTRUCTIONS
Immune Globulin Injection  What is this medicine?  IMMUNE GLOBULIN (im MUNLAITH vinson) helps to prevent or reduce the severity of certain infections in patients who are at risk. This medicine is collected from the pooled blood of many donors. It is used to treat immune system problems, thrombocytopenia, and Kawasaki syndrome.  How should I use this medicine?  This medicine is for injection into a muscle or infusion into a vein or skin. It is usually given by a health care professional in a hospital or clinic setting.  In rare cases, some brands of this medicine might be given at home. You will be taught how to give this medicine. Use exactly as directed. Take your medicine at regular intervals. Do not take your medicine more often than directed.  Talk to your pediatrician regarding the use of this medicine in children. Special care may be needed.  What side effects may I notice from receiving this medicine?  Side effects that you should report to your doctor or health care professional as soon as possible:  · allergic reactions like skin rash, itching or hives, swelling of the face, lips, or tongue  · breathing problems  · chest pain or tightness  · fever, chills  · headache with nausea, vomiting  · neck pain or difficulty moving neck  · pain when moving eyes  · pain, swelling, warmth in the leg  · problems with balance, talking, walking  · sudden weight gain  · swelling of the ankles, feet, hands  · trouble passing urine or change in the amount of urine  Side effects that usually do not require medical attention (report to your doctor or health care professional if they continue or are bothersome):  · dizzy, drowsy  · flushing  · increased sweating  · leg cramps  · muscle aches and pains  · pain at site where injected  What may interact with this medicine?  · aspirin and aspirin-like medicines  · cisplatin  · cyclosporine  · medicines for infection like acyclovir, adefovir, amphotericin B, bacitracin,  cidofovir, foscarnet, ganciclovir, gentamicin, pentamidine, vancomycin  · NSAIDS, medicines for pain and inflammation, like ibuprofen or naproxen  · pamidronate  · vaccines  · zoledronic acid  What if I miss a dose?  It is important not to miss your dose. Call your doctor or health care professional if you are unable to keep an appointment. If you give yourself the medicine and you miss a dose, take it as soon as you can. If it is almost time for your next dose, take only that dose. Do not take double or extra doses.  Where should I keep my medicine?  Keep out of the reach of children.  This drug is usually given in a hospital or clinic and will not be stored at home.  In rare cases, some brands of this medicine may be given at home. If you are using this medicine at home, you will be instructed on how to store this medicine. Throw away any unused medicine after the expiration date on the label.  What should I tell my health care provider before I take this medicine?  They need to know if you have any of these conditions:  · diabetes  · extremely low or no immune antibodies in the blood  · heart disease  · history of blood clots  · hyperprolinemia  · infection in the blood, sepsis  · kidney disease  · taking medicine that may change kidney function - ask your health care provider about your medicine  · an unusual or allergic reaction to human immune globulin, albumin, maltose, sucrose, polysorbate 80, other medicines, foods, dyes, or preservatives  · pregnant or trying to get pregnant  · breast-feeding  What should I watch for while using this medicine?  Your condition will be monitored carefully while you are receiving this medicine.  This medicine is made from pooled blood donations of many different people. It may be possible to pass an infection in this medicine. However, the donors are screened for infections and all products are tested for HIV and hepatitis. The medicine is treated to kill most or all bacteria  and viruses. Talk to your doctor about the risks and benefits of this medicine.  Do not have vaccinations for at least 14 days before, or until at least 3 months after receiving this medicine.  NOTE:This sheet is a summary. It may not cover all possible information. If you have questions about this medicine, talk to your doctor, pharmacist, or health care provider. Copyright© 2017 [employer]

## 2019-04-30 NOTE — NURSING
Infusion# 1 of 2 cycle 2  S/S infection noted or voiced? None  Recent labs? 4/30/2019  Recent vaccines? Denies    Premeds? Tylenol 500 mg PO and Benadryl 25 mg IVP over 2 mins    IVIG 1 G/Kg =  65 grams administered IV and titrated over 5 hours per orders; see MAR & Flow Sheet for more details. Tolerated well without adverse events

## 2019-05-01 ENCOUNTER — INFUSION (OUTPATIENT)
Dept: RHEUMATOLOGY | Facility: HOSPITAL | Age: 69
End: 2019-05-01
Attending: INTERNAL MEDICINE
Payer: MEDICARE

## 2019-05-01 VITALS
WEIGHT: 139.56 LBS | SYSTOLIC BLOOD PRESSURE: 122 MMHG | TEMPERATURE: 99 F | HEART RATE: 76 BPM | DIASTOLIC BLOOD PRESSURE: 63 MMHG | OXYGEN SATURATION: 95 % | RESPIRATION RATE: 18 BRPM | BODY MASS INDEX: 23.22 KG/M2

## 2019-05-01 DIAGNOSIS — R49.0 DYSPHONIA: ICD-10-CM

## 2019-05-01 DIAGNOSIS — M35.1 MIXED CONNECTIVE TISSUE DISEASE: ICD-10-CM

## 2019-05-01 DIAGNOSIS — R74.8 ELEVATED CK: Primary | ICD-10-CM

## 2019-05-01 PROCEDURE — 96366 THER/PROPH/DIAG IV INF ADDON: CPT

## 2019-05-01 PROCEDURE — 96375 TX/PRO/DX INJ NEW DRUG ADDON: CPT

## 2019-05-01 PROCEDURE — 63600175 PHARM REV CODE 636 W HCPCS: Performed by: INTERNAL MEDICINE

## 2019-05-01 PROCEDURE — 25000003 PHARM REV CODE 250: Performed by: INTERNAL MEDICINE

## 2019-05-01 PROCEDURE — 96365 THER/PROPH/DIAG IV INF INIT: CPT

## 2019-05-01 RX ORDER — SODIUM CHLORIDE 0.9 % (FLUSH) 0.9 %
10 SYRINGE (ML) INJECTION
Status: CANCELLED | OUTPATIENT
Start: 2019-05-29

## 2019-05-01 RX ORDER — SODIUM CHLORIDE 0.9 % (FLUSH) 0.9 %
10 SYRINGE (ML) INJECTION
Status: DISCONTINUED | OUTPATIENT
Start: 2019-05-01 | End: 2019-05-01 | Stop reason: HOSPADM

## 2019-05-01 RX ORDER — DIPHENHYDRAMINE HYDROCHLORIDE 50 MG/ML
25 INJECTION INTRAMUSCULAR; INTRAVENOUS
Status: DISCONTINUED | OUTPATIENT
Start: 2019-05-01 | End: 2019-05-01 | Stop reason: HOSPADM

## 2019-05-01 RX ORDER — DIPHENHYDRAMINE HCL 25 MG
25 CAPSULE ORAL
Status: CANCELLED | OUTPATIENT
Start: 2019-05-29

## 2019-05-01 RX ORDER — ACETAMINOPHEN 500 MG
500 TABLET ORAL
Status: CANCELLED | OUTPATIENT
Start: 2019-05-29

## 2019-05-01 RX ORDER — ACETAMINOPHEN 500 MG
500 TABLET ORAL
Status: DISCONTINUED | OUTPATIENT
Start: 2019-05-01 | End: 2019-05-01 | Stop reason: HOSPADM

## 2019-05-01 RX ORDER — DIPHENHYDRAMINE HYDROCHLORIDE 50 MG/ML
25 INJECTION INTRAMUSCULAR; INTRAVENOUS
Status: CANCELLED | OUTPATIENT
Start: 2019-05-29

## 2019-05-01 RX ORDER — HEPARIN 100 UNIT/ML
500 SYRINGE INTRAVENOUS
Status: CANCELLED | OUTPATIENT
Start: 2019-05-29

## 2019-05-01 RX ADMIN — HUMAN IMMUNOGLOBULIN G 65 G: 40 LIQUID INTRAVENOUS at 08:05

## 2019-05-01 RX ADMIN — DIPHENHYDRAMINE HYDROCHLORIDE 25 MG: 50 INJECTION INTRAMUSCULAR; INTRAVENOUS at 08:05

## 2019-05-01 RX ADMIN — ACETAMINOPHEN 500 MG: 500 TABLET ORAL at 08:05

## 2019-05-01 NOTE — NURSING
Infusion# 2 of 2 cycle 2  S/S infection noted or voiced? Denied/none noted   Recent labs? 4/30/19    Premeds? Tylenol 500 mg po and Benadryl 25 mg IVP over 3 min    IVIG/Privigen 1 Gm/Kg X 63.3kg = 65 Gm administered IV and titrated per protocol.  Infused at 19 ml/hr x 30 min then increased to 38 ml/hr X 30 min then increased 38, then increased to 76ml/hr X 30 min, and completed infusion at 152ml/hr; see MAR & vitals for details.   Tolerated well without adverse events, discharged ambulatory out of clinic.

## 2019-05-21 ENCOUNTER — LAB VISIT (OUTPATIENT)
Dept: LAB | Facility: HOSPITAL | Age: 69
End: 2019-05-21
Attending: INTERNAL MEDICINE
Payer: MEDICARE

## 2019-05-21 ENCOUNTER — OFFICE VISIT (OUTPATIENT)
Dept: RHEUMATOLOGY | Facility: CLINIC | Age: 69
End: 2019-05-21
Payer: MEDICARE

## 2019-05-21 VITALS
SYSTOLIC BLOOD PRESSURE: 126 MMHG | DIASTOLIC BLOOD PRESSURE: 70 MMHG | HEART RATE: 95 BPM | BODY MASS INDEX: 23.88 KG/M2 | WEIGHT: 143.31 LBS | HEIGHT: 65 IN

## 2019-05-21 DIAGNOSIS — R74.8 ELEVATED CK: ICD-10-CM

## 2019-05-21 DIAGNOSIS — M35.1 MIXED CONNECTIVE TISSUE DISEASE: ICD-10-CM

## 2019-05-21 DIAGNOSIS — M35.1 MIXED CONNECTIVE TISSUE DISEASE: Primary | ICD-10-CM

## 2019-05-21 DIAGNOSIS — Z71.89 COUNSELING ON HEALTH PROMOTION AND DISEASE PREVENTION: ICD-10-CM

## 2019-05-21 DIAGNOSIS — Q99.8 MYOSITIS ASSOCIATED ANTIBODY POSITIVE: ICD-10-CM

## 2019-05-21 DIAGNOSIS — J84.9 ILD (INTERSTITIAL LUNG DISEASE): ICD-10-CM

## 2019-05-21 LAB
ALBUMIN SERPL BCP-MCNC: 2.9 G/DL (ref 3.5–5.2)
ALP SERPL-CCNC: 64 U/L (ref 55–135)
ALT SERPL W/O P-5'-P-CCNC: 25 U/L (ref 10–44)
ANION GAP SERPL CALC-SCNC: 8 MMOL/L (ref 8–16)
AST SERPL-CCNC: 34 U/L (ref 10–40)
BASOPHILS # BLD AUTO: 0.01 K/UL (ref 0–0.2)
BASOPHILS NFR BLD: 0.2 % (ref 0–1.9)
BILIRUB SERPL-MCNC: 0.2 MG/DL (ref 0.1–1)
BUN SERPL-MCNC: 8 MG/DL (ref 8–23)
CALCIUM SERPL-MCNC: 9.1 MG/DL (ref 8.7–10.5)
CHLORIDE SERPL-SCNC: 106 MMOL/L (ref 95–110)
CK SERPL-CCNC: 330 U/L (ref 20–180)
CO2 SERPL-SCNC: 27 MMOL/L (ref 23–29)
CREAT SERPL-MCNC: 0.8 MG/DL (ref 0.5–1.4)
CRP SERPL-MCNC: 7.5 MG/L (ref 0–8.2)
DIFFERENTIAL METHOD: ABNORMAL
EOSINOPHIL # BLD AUTO: 0.1 K/UL (ref 0–0.5)
EOSINOPHIL NFR BLD: 1 % (ref 0–8)
ERYTHROCYTE [DISTWIDTH] IN BLOOD BY AUTOMATED COUNT: 15.2 % (ref 11.5–14.5)
ERYTHROCYTE [SEDIMENTATION RATE] IN BLOOD BY WESTERGREN METHOD: 99 MM/HR (ref 0–36)
EST. GFR  (AFRICAN AMERICAN): >60 ML/MIN/1.73 M^2
EST. GFR  (NON AFRICAN AMERICAN): >60 ML/MIN/1.73 M^2
GLUCOSE SERPL-MCNC: 94 MG/DL (ref 70–110)
HCT VFR BLD AUTO: 37 % (ref 37–48.5)
HGB BLD-MCNC: 11.1 G/DL (ref 12–16)
IMM GRANULOCYTES # BLD AUTO: 0.01 K/UL (ref 0–0.04)
IMM GRANULOCYTES NFR BLD AUTO: 0.2 % (ref 0–0.5)
LYMPHOCYTES # BLD AUTO: 0.9 K/UL (ref 1–4.8)
LYMPHOCYTES NFR BLD: 14.6 % (ref 18–48)
MCH RBC QN AUTO: 25.7 PG (ref 27–31)
MCHC RBC AUTO-ENTMCNC: 30 G/DL (ref 32–36)
MCV RBC AUTO: 86 FL (ref 82–98)
MONOCYTES # BLD AUTO: 0.4 K/UL (ref 0.3–1)
MONOCYTES NFR BLD: 6.5 % (ref 4–15)
NEUTROPHILS # BLD AUTO: 4.8 K/UL (ref 1.8–7.7)
NEUTROPHILS NFR BLD: 77.7 % (ref 38–73)
NRBC BLD-RTO: 0 /100 WBC
PLATELET # BLD AUTO: 237 K/UL (ref 150–350)
PMV BLD AUTO: 9.9 FL (ref 9.2–12.9)
POTASSIUM SERPL-SCNC: 4 MMOL/L (ref 3.5–5.1)
PROT SERPL-MCNC: 9 G/DL (ref 6–8.4)
RBC # BLD AUTO: 4.32 M/UL (ref 4–5.4)
SODIUM SERPL-SCNC: 141 MMOL/L (ref 136–145)
WBC # BLD AUTO: 6.16 K/UL (ref 3.9–12.7)

## 2019-05-21 PROCEDURE — 85652 RBC SED RATE AUTOMATED: CPT

## 2019-05-21 PROCEDURE — 99215 OFFICE O/P EST HI 40 MIN: CPT | Mod: S$PBB,,, | Performed by: INTERNAL MEDICINE

## 2019-05-21 PROCEDURE — 36415 COLL VENOUS BLD VENIPUNCTURE: CPT

## 2019-05-21 PROCEDURE — 82550 ASSAY OF CK (CPK): CPT

## 2019-05-21 PROCEDURE — 85025 COMPLETE CBC W/AUTO DIFF WBC: CPT

## 2019-05-21 PROCEDURE — 99215 PR OFFICE/OUTPT VISIT, EST, LEVL V, 40-54 MIN: ICD-10-PCS | Mod: S$PBB,,, | Performed by: INTERNAL MEDICINE

## 2019-05-21 PROCEDURE — 86140 C-REACTIVE PROTEIN: CPT

## 2019-05-21 PROCEDURE — 80053 COMPREHEN METABOLIC PANEL: CPT

## 2019-05-21 PROCEDURE — 82085 ASSAY OF ALDOLASE: CPT

## 2019-05-21 PROCEDURE — 99999 PR PBB SHADOW E&M-EST. PATIENT-LVL III: ICD-10-PCS | Mod: PBBFAC,,, | Performed by: INTERNAL MEDICINE

## 2019-05-21 PROCEDURE — 99213 OFFICE O/P EST LOW 20 MIN: CPT | Mod: PBBFAC,PN | Performed by: INTERNAL MEDICINE

## 2019-05-21 PROCEDURE — 99999 PR PBB SHADOW E&M-EST. PATIENT-LVL III: CPT | Mod: PBBFAC,,, | Performed by: INTERNAL MEDICINE

## 2019-05-21 RX ORDER — MYCOPHENOLATE MOFETIL 500 MG/1
500 TABLET ORAL 2 TIMES DAILY
Qty: 60 TABLET | Refills: 11 | Status: SHIPPED | OUTPATIENT
Start: 2019-05-21 | End: 2019-05-30

## 2019-05-21 NOTE — PROGRESS NOTES
RHEUMATOLOGY OUTPATIENT CLINIC NOTE    5/21/2019    Attending Rheumatologist: Zachariah Thurman  Primary Care Provider: Ariana Rebollar MD   Physician Requesting Consultation: No referring provider defined for this encounter.  Chief Complaint/Reason For Consultation:  MCTD.    Subjective:       DOYLE Fernandes is a 68 y.o. Black or  female MCTD with ILD comes for follow up.    Today:  Last seen on February.  Outside provider notes reviewed, significant for no conduction study/EMG with myopathic changes, history of abnormal muscle biopsy with inflammatory myopathy.  Evaluated by ENT,Velopharyngeal insufficiency, dysphonia, dysphagia, and soft palate paralysis described.  Evaluated by Neurology, workup for myasthenia gravis and CVA unrevealing. Was recommended for IVIG for myositis related to mixed connective tissue disease along with blood work.  Has received 2 cycles of IVIG without significant side effects.  Patient's symptoms are stable (no improvement however).  Voices no acute complaints today.  Dyspnea stable.  Denies any fever, new joint pain or swelling.  Does not have any rash,  or GI complaints.    Review of Systems   Constitutional: Negative for chills and fever.   HENT:        Denies eye or mouth sicca symptoms, denies oral ulcers, denies parotid swelling, denies swollen glands.   Eyes: Negative for pain and redness.   Respiratory: Positive for cough and shortness of breath (Chronic, stable).    Cardiovascular: Negative for chest pain and leg swelling.   Gastrointestinal: Negative for abdominal pain, blood in stool and heartburn.        + dysphagia and dysphonia, chronic.   Genitourinary: Negative for dysuria, hematuria and urgency.   Musculoskeletal: Positive for joint pain (Mostly right knee, mechanical pattern.). Negative for back pain.   Skin: Negative for rash.        Denies photosensitivity, denies alopecia, denies sclerodactyly,+ Raynaud's phenomenon,denies digital  ulcers, no psoriasis, ulcerations, no purpura, denies nodules.   Neurological: Negative for tingling and focal weakness.   Endo/Heme/Allergies: Does not bruise/bleed easily.   Psychiatric/Behavioral: Negative for substance abuse. The patient does not have insomnia.    All other systems reviewed and are negative.      Chronic comorbid conditions affecting medical decision making today:  Past Medical History:   Diagnosis Date    Allergy     Aortic regurgitation     Arthritis     Deviated nasal septum     Diabetes mellitus     Dysphagia     GERD (gastroesophageal reflux disease)     Hypertension     PAOLA (obstructive sleep apnea)     Pulmonary fibrosis     Pulmonary hypertension     Pulmonic regurgitation     Restrictive lung disease     Sinus bradycardia     Valvular heart disease    · MCTD (MSK manifestations, RP, Sicca, + Ab, lung fibrosis, pHTN)  · Hypertension  · PAOLA    Past Surgical History:   Procedure Laterality Date    APPENDECTOMY       SECTION      COLONOSCOPY  2011    ESOPHAGEAL MANOMETRY  2018    GASTROSCOPY  02/15/2018     Family History   Problem Relation Age of Onset    Cancer Mother     Diabetes Mellitus Father      Social History     Substance and Sexual Activity   Alcohol Use No    Frequency: Never     Social History     Tobacco Use   Smoking Status Never Smoker   Smokeless Tobacco Never Used     Social History     Substance and Sexual Activity   Drug Use No       Current Outpatient Medications:     amLODIPine (NORVASC) 5 MG tablet, Take 5 mg by mouth once daily., Disp: , Rfl:     azaTHIOprine (IMURAN) 50 mg Tab, Take 1 tablet (50 mg total) by mouth 2 (two) times daily., Disp: 60 tablet, Rfl: 2    carvedilol (COREG) 3.125 MG tablet, Take 3.125 mg by mouth 2 (two) times daily with meals., Disp: , Rfl:     folic acid (FOLVITE) 800 MCG Tab, Take 800 mcg by mouth once daily., Disp: , Rfl:     hypromellose (SYSTANE GEL OPHT), Apply 1 drop to eye 3 (three)  times daily. I gel drop to each eye TID, Disp: , Rfl:     pantoprazole (PROTONIX) 20 MG tablet, Take 20 mg by mouth 2 (two) times daily before meals., Disp: , Rfl:     predniSONE (DELTASONE) 5 MG tablet, Take 5 mg by mouth once daily., Disp: , Rfl:     losartan (COZAAR) 50 MG tablet, Take 50 mg by mouth once daily., Disp: , Rfl:     mycophenolate (CELLCEPT) 500 mg Tab, Take 1 tablet (500 mg total) by mouth 2 (two) times daily., Disp: 60 tablet, Rfl: 11     Objective:         Vitals:    05/21/19 1201   BP: 126/70   Pulse: 95     Physical Exam   Nursing note and vitals reviewed.  Constitutional: She is oriented to person, place, and time and well-developed, well-nourished, and in no distress. No distress.   HENT:   Head: Normocephalic.   + dysphonic voice.   Eyes: Conjunctivae are normal. Pupils are equal, round, and reactive to light.   Absent Episcleritis/scleritis.   Neck: Normal range of motion.   Cardiovascular: Normal rate and intact distal pulses.    Pulmonary/Chest: Effort normal. No respiratory distress. She has rales (Slight crepitus, mostly bases.).   Abdominal: Soft. She exhibits no distension.   Neurological: She is alert and oriented to person, place, and time.   absent sensory deficits  muscle strength 5/5 through.    Skin: No rash noted.     No Skin fibrosis, teleangiectasias, rashes, discoid lesions, purpura, skin ulcers, nodules, or livedo reticularis, nail pitting.   Psychiatric:   Anxious.   Musculoskeletal: Normal range of motion. She exhibits no edema or deformity.   : strong  No synovitis.    AROM: intact  PROM: intact    Devices used by patient: none       Reviewed old and all outside pertinent medical records available.    All lab results personally reviewed and interpreted by me.  Lab Results   Component Value Date    WBC 6.16 05/21/2019    HGB 11.1 (L) 05/21/2019    HCT 37.0 05/21/2019    MCV 86 05/21/2019    MCH 25.7 (L) 05/21/2019    MCHC 30.0 (L) 05/21/2019    RDW 15.2 (H)  05/21/2019     05/21/2019    MPV 9.9 05/21/2019       Lab Results   Component Value Date     05/21/2019    K 4.0 05/21/2019     05/21/2019    CO2 27 05/21/2019    GLU 94 05/21/2019    BUN 8 05/21/2019    CALCIUM 9.1 05/21/2019    PROT 9.0 (H) 05/21/2019    ALBUMIN 2.9 (L) 05/21/2019    BILITOT 0.2 05/21/2019    AST 34 05/21/2019    ALKPHOS 64 05/21/2019    ALT 25 05/21/2019       No results found for: COLORU, APPEARANCEUA, SPECGRAV, PHUR, PROTEINUA, GLUCOSEU, KETONESU, BLOODU, LEUKOCYTESUR, NITRITE, UROBILINOGEN    Lab Results   Component Value Date    CRP 7.5 05/21/2019       Lab Results   Component Value Date    SEDRATE 103 (H) 04/30/2019       Lab Results   Component Value Date    RF <10.0 02/06/2019    SEDRATE 103 (H) 04/30/2019       No components found for: 25OHVITDTOT, 12ECMMSW0, 16TQXGAS6, METHODNOTE    No results found for: URICACID    No components found for: TSPOTTB    · CK: 981 (3/2019)-> 969 (4/2019)-> 330 (5/2019)\  · Aldolase: 12.8 (3/2019)-> pending  · LDH: 333    Rheum Labs:  ISMAEL 1 in 320 speckled pattern  RNP 6.2 (reference less than 1)  SSA/B negative  Anti histone 3.5 (+)  Anti Smith negative  SCL 70 antibody negative  C3/4 within normal range  Double-stranded DNA negative  ESR 63-> 57  CRP 0.2 -> 10.5  ANCA negative, MPO/MA 3 negative  Myomarker panel3:  (ref <20): Anti Abigail 1: 31. EJ weak +. MDMA5: 31. U1-RNP 50.    Infectious Labs:  Hepatitis profile nonreactive    Imaging:  All imaging reviewed and independently  interpreted by me.    CT maxillofacial June 2016  Septal deviation.  Mucous cyst right maxillary sinus.    CT chest November 2016  Patchy ground-glass opacities, improvement compared to previous imaging.      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 Black or  female with:    1.  Mixed connective tissue disease  -  associated with ILD anf pHTN, being managed by outside pulmonologist  - elevated muscle enzymes, chronic dysphonia, dysphagia noted with Velopharyngeal insufficiency and soft palate paralysis   - presentation highly suggestive of myositis associated with mixed connective tissue disease.  - No significant clinical response noted yet, but  muscle enzymes improved after administration of IVIG 2g /kg 4/2019 (s/p 2 cycles).    - currently on AZA 50mg bid.  Will recommend to be replaced by MMF (goal of 2gm per day) due to refractory myositis  - autoantibody profile associated with suboptimal response to therapy and worse prognosis  - if no clinical response with IVIG, may consider switching to rituximab.  - will consider tapering prednisone after initiation of CellCept.  - following with outside pulmonology.  Refers had x-rays and PFTs done recently, told remains stable.  Recommended to have notes and results faxed to clinic.  - discussed with her primary care provider Dr. Rebollar [phone].    2. Chronic DMARD use  - monitor for toxicity  - CBC, CMP    3. Other specified counseling  - over 10 minutes spent regarding below topics:  - Immunization counseling done.  PSV13 given on 2/6.  PSV23 will be provided on next visit.  - Medication counseling provided.  - age appropriate malignancy screening per PMD recommendations    RTC 2 months    Method of contact with patient concerns: Kristi attn Rheumatology    Disclaimer:  This note is prepared using voice recognition software and as such is likely to have errors and has not been proof read. Please contact me for questions.     Time spent: 40 minutes in face to face discussion concerning diagnosis, prognosis, review of lab and test results, benefits of treatment as well as management of disease, counseling of patient and coordination of care between various health care providers.  Greater than half the time spent was used for coordination of care and counseling of  patient.    Zachariah Thurman M.D.  Rheumatology Department   [facility]

## 2019-05-22 ENCOUNTER — TELEPHONE (OUTPATIENT)
Dept: PHARMACY | Facility: CLINIC | Age: 69
End: 2019-05-22

## 2019-05-22 LAB — ALDOLASE SERPL-CCNC: 6.2 U/L (ref 1.2–7.6)

## 2019-05-22 NOTE — TELEPHONE ENCOUNTER
LVM for callback to inform patient that [facility] received prescription for mycophenolate and prior authorization is required.  [facility] will be back in touch once insurance determination is received.

## 2019-05-24 ENCOUNTER — TELEPHONE (OUTPATIENT)
Dept: RHEUMATOLOGY | Facility: CLINIC | Age: 69
End: 2019-05-24

## 2019-05-24 NOTE — TELEPHONE ENCOUNTER
----- Message from Sudarshan Babcock PharmD sent at 5/24/2019 12:00 PM CDT -----  Regarding: Mycophenolate  Good afternoon Dr. Thurman and staff,    [facility] received the prescription that you sent for Mycophenolate.  While reviewing the prescription I noticed that there was a drug interaction between mycophenolate and protonix.  It appears that proton pump inhibitors may reduce exposure to MPA, the active metabolite of mycophenolate.  These can still be given together, but the effectiveness of mycophenolate may be somewhat reduced.  I spoke with patient to confirm that she is still taking protonix 20mg twice a day.  Please let us know how you would like us to proceed.  In the meantime, [facility] will continue to work on getting mycophenolate approved by the insurance company.  Thank you for your time.    Best regards,    Sudarshan Babcock, Pharm D  Clinical Pharmacist  [facility]  [phone]

## 2019-05-24 NOTE — TELEPHONE ENCOUNTER
DOCUMENTATION ONLY:  Prior authorization for Mycophenolate not required.    Co-pay: $9.96    Patient Assistance is not required.

## 2019-05-29 ENCOUNTER — INFUSION (OUTPATIENT)
Dept: RHEUMATOLOGY | Facility: HOSPITAL | Age: 69
End: 2019-05-29
Attending: INTERNAL MEDICINE
Payer: MEDICARE

## 2019-05-29 VITALS
WEIGHT: 142.19 LBS | SYSTOLIC BLOOD PRESSURE: 115 MMHG | HEART RATE: 75 BPM | BODY MASS INDEX: 23.66 KG/M2 | TEMPERATURE: 98 F | RESPIRATION RATE: 18 BRPM | DIASTOLIC BLOOD PRESSURE: 58 MMHG

## 2019-05-29 DIAGNOSIS — M35.1 MIXED CONNECTIVE TISSUE DISEASE: ICD-10-CM

## 2019-05-29 DIAGNOSIS — R74.8 ELEVATED CK: Primary | ICD-10-CM

## 2019-05-29 DIAGNOSIS — R49.0 DYSPHONIA: ICD-10-CM

## 2019-05-29 PROCEDURE — 96366 THER/PROPH/DIAG IV INF ADDON: CPT

## 2019-05-29 PROCEDURE — A4216 STERILE WATER/SALINE, 10 ML: HCPCS | Performed by: INTERNAL MEDICINE

## 2019-05-29 PROCEDURE — 25000003 PHARM REV CODE 250: Performed by: INTERNAL MEDICINE

## 2019-05-29 PROCEDURE — 96375 TX/PRO/DX INJ NEW DRUG ADDON: CPT

## 2019-05-29 PROCEDURE — 63600175 PHARM REV CODE 636 W HCPCS: Performed by: INTERNAL MEDICINE

## 2019-05-29 PROCEDURE — 96365 THER/PROPH/DIAG IV INF INIT: CPT

## 2019-05-29 RX ORDER — SODIUM CHLORIDE 0.9 % (FLUSH) 0.9 %
10 SYRINGE (ML) INJECTION
Status: CANCELLED | OUTPATIENT
Start: 2019-06-26

## 2019-05-29 RX ORDER — SODIUM CHLORIDE 0.9 % (FLUSH) 0.9 %
10 SYRINGE (ML) INJECTION
Status: DISCONTINUED | OUTPATIENT
Start: 2019-05-29 | End: 2019-05-29 | Stop reason: HOSPADM

## 2019-05-29 RX ORDER — DIPHENHYDRAMINE HYDROCHLORIDE 50 MG/ML
25 INJECTION INTRAMUSCULAR; INTRAVENOUS
Status: DISCONTINUED | OUTPATIENT
Start: 2019-05-29 | End: 2019-05-29 | Stop reason: HOSPADM

## 2019-05-29 RX ORDER — HEPARIN 100 UNIT/ML
500 SYRINGE INTRAVENOUS
Status: CANCELLED | OUTPATIENT
Start: 2019-06-26

## 2019-05-29 RX ORDER — DIPHENHYDRAMINE HCL 25 MG
25 CAPSULE ORAL
Status: CANCELLED | OUTPATIENT
Start: 2019-06-26

## 2019-05-29 RX ORDER — ACETAMINOPHEN 500 MG
500 TABLET ORAL
Status: DISCONTINUED | OUTPATIENT
Start: 2019-05-29 | End: 2019-05-29 | Stop reason: HOSPADM

## 2019-05-29 RX ORDER — ACETAMINOPHEN 500 MG
500 TABLET ORAL
Status: CANCELLED | OUTPATIENT
Start: 2019-06-26

## 2019-05-29 RX ORDER — DIPHENHYDRAMINE HYDROCHLORIDE 50 MG/ML
25 INJECTION INTRAMUSCULAR; INTRAVENOUS
Status: CANCELLED | OUTPATIENT
Start: 2019-06-26

## 2019-05-29 RX ADMIN — ACETAMINOPHEN 500 MG: 500 TABLET ORAL at 10:05

## 2019-05-29 RX ADMIN — DIPHENHYDRAMINE HYDROCHLORIDE 25 MG: 50 INJECTION INTRAMUSCULAR; INTRAVENOUS at 10:05

## 2019-05-29 RX ADMIN — Medication 10 ML: at 10:05

## 2019-05-29 RX ADMIN — HUMAN IMMUNOGLOBULIN G 55 G: 40 LIQUID INTRAVENOUS at 10:05

## 2019-05-29 NOTE — NURSING
Infusion# 1 of 2 cycle 3  S/S infection noted or voiced? None noted/denied  Recent labs? yes    Premeds? Tylenol 500 mg po, Benadryl 25 mg IVP  IVIG/Privigen 1 Gm/Kg= 55 Gm (Ideal Body wgt) administered IV and titrated per protocol.  Infused at:  19 ml/hr x 30 min   39 ml/hr x 30 min  77ml/hr x 30 min  116 ml/hr x 30 min  155 ml/ hr x 30 min.  see MAR & vitals for details.   Tolerated well without adverse events, discharged ambulatory out of clinic.

## 2019-05-30 ENCOUNTER — INFUSION (OUTPATIENT)
Dept: RHEUMATOLOGY | Facility: HOSPITAL | Age: 69
End: 2019-05-30
Attending: INTERNAL MEDICINE
Payer: MEDICARE

## 2019-05-30 VITALS
RESPIRATION RATE: 16 BRPM | SYSTOLIC BLOOD PRESSURE: 134 MMHG | OXYGEN SATURATION: 98 % | BODY MASS INDEX: 23.48 KG/M2 | DIASTOLIC BLOOD PRESSURE: 67 MMHG | WEIGHT: 141.13 LBS | TEMPERATURE: 97 F | HEART RATE: 79 BPM

## 2019-05-30 DIAGNOSIS — R74.8 ELEVATED CK: Primary | ICD-10-CM

## 2019-05-30 DIAGNOSIS — M35.1 MIXED CONNECTIVE TISSUE DISEASE: ICD-10-CM

## 2019-05-30 DIAGNOSIS — R49.0 DYSPHONIA: ICD-10-CM

## 2019-05-30 PROCEDURE — 96366 THER/PROPH/DIAG IV INF ADDON: CPT

## 2019-05-30 PROCEDURE — 96365 THER/PROPH/DIAG IV INF INIT: CPT

## 2019-05-30 PROCEDURE — 25000003 PHARM REV CODE 250: Performed by: INTERNAL MEDICINE

## 2019-05-30 PROCEDURE — 96375 TX/PRO/DX INJ NEW DRUG ADDON: CPT

## 2019-05-30 PROCEDURE — 63600175 PHARM REV CODE 636 W HCPCS: Performed by: INTERNAL MEDICINE

## 2019-05-30 RX ORDER — DIPHENHYDRAMINE HCL 25 MG
25 CAPSULE ORAL
Status: CANCELLED | OUTPATIENT
Start: 2019-06-26

## 2019-05-30 RX ORDER — ACETAMINOPHEN 500 MG
500 TABLET ORAL
Status: CANCELLED | OUTPATIENT
Start: 2019-06-26

## 2019-05-30 RX ORDER — SODIUM CHLORIDE 0.9 % (FLUSH) 0.9 %
10 SYRINGE (ML) INJECTION
Status: DISCONTINUED | OUTPATIENT
Start: 2019-05-30 | End: 2019-05-30 | Stop reason: HOSPADM

## 2019-05-30 RX ORDER — DIPHENHYDRAMINE HYDROCHLORIDE 50 MG/ML
25 INJECTION INTRAMUSCULAR; INTRAVENOUS
Status: DISCONTINUED | OUTPATIENT
Start: 2019-05-30 | End: 2019-05-30 | Stop reason: HOSPADM

## 2019-05-30 RX ORDER — SODIUM CHLORIDE 0.9 % (FLUSH) 0.9 %
10 SYRINGE (ML) INJECTION
Status: CANCELLED | OUTPATIENT
Start: 2019-06-26

## 2019-05-30 RX ORDER — DIPHENHYDRAMINE HYDROCHLORIDE 50 MG/ML
25 INJECTION INTRAMUSCULAR; INTRAVENOUS
Status: CANCELLED | OUTPATIENT
Start: 2019-06-26

## 2019-05-30 RX ORDER — ACETAMINOPHEN 500 MG
500 TABLET ORAL
Status: DISCONTINUED | OUTPATIENT
Start: 2019-05-30 | End: 2019-05-30 | Stop reason: HOSPADM

## 2019-05-30 RX ORDER — HEPARIN 100 UNIT/ML
500 SYRINGE INTRAVENOUS
Status: CANCELLED | OUTPATIENT
Start: 2019-06-26

## 2019-05-30 RX ADMIN — ACETAMINOPHEN 500 MG: 500 TABLET ORAL at 08:05

## 2019-05-30 RX ADMIN — DIPHENHYDRAMINE HYDROCHLORIDE 25 MG: 50 INJECTION INTRAMUSCULAR; INTRAVENOUS at 08:05

## 2019-05-30 RX ADMIN — HUMAN IMMUNOGLOBULIN G 55 G: 20 LIQUID INTRAVENOUS at 09:05

## 2019-05-30 NOTE — PLAN OF CARE
Problem: Fall Injury Risk  Goal: Absence of Fall and Fall-Related Injury  Outcome: Ongoing (interventions implemented as appropriate)  FALL PREVENTION   Falls often occur due to slipping, tripping or losing your balance. Here are ways to reduce your risk of falling again.   Was there anything that caused your fall that can be fixed, removed or replaced?   Make your home safe by keeping walkways clear of objects you may trip over.   Use non-slip pads under rugs.   Do not walk in poorly lit areas.   Do not stand on chairs or wobbly ladders.   Use caution when reaching overhead or looking upward. This position can cause a loss of balance.   Be sure your shoes fit properly, have non-slip bottoms and are in good condition.   Be cautious when going up and down stairs, curbs, and when walking on uneven sidewalks.   If your balance is poor, consider using a cane or walker.   If your fall was related to alcohol use, stop or limit alcohol intake.   If your fall was related to use of sleeping medicines, talk to your doctor about this. You may need to reduce your dosage at bedtime if you awaken during the night to go to the bathroom.   To reduce the need for nighttime bathroom trips:   Avoid drinking fluids for several hours before going to bed   Empty your bladder before going to bed   Men can keep a urinal at the bedside   © 3595-0864 [employer], [address] [place] [address], [place] [address]. All rights reserved. This information is not intended as a substitute for professional medical care. Always follow your healthcare professional's instructions.

## 2019-05-30 NOTE — PLAN OF CARE
Problem: Adult Inpatient Plan of Care  Goal: Plan of Care Review  Outcome: Ongoing (interventions implemented as appropriate)  Pt said she went to the gym yesterday after leaving and slept great.

## 2019-05-30 NOTE — DISCHARGE INSTRUCTIONS
FALL PREVENTION   Falls often occur due to slipping, tripping or losing your balance. Here are ways to reduce your risk of falling again.   Was there anything that caused your fall that can be fixed, removed or replaced?   Make your home safe by keeping walkways clear of objects you may trip over.   Use non-slip pads under rugs.   Do not walk in poorly lit areas.   Do not stand on chairs or wobbly ladders.   Use caution when reaching overhead or looking upward. This position can cause a loss of balance.   Be sure your shoes fit properly, have non-slip bottoms and are in good condition.   Be cautious when going up and down stairs, curbs, and when walking on uneven sidewalks.   If your balance is poor, consider using a cane or walker.   If your fall was related to alcohol use, stop or limit alcohol intake.   If your fall was related to use of sleeping medicines, talk to your doctor about this. You may need to reduce your dosage at bedtime if you awaken during the night to go to the bathroom.   To reduce the need for nighttime bathroom trips:   Avoid drinking fluids for several hours before going to bed   Empty your bladder before going to bed   Men can keep a urinal at the bedside   © 9231-4794 [employer] [facility], [address] [place] [address], [place] [address]. All rights reserved. This information is not intended as a substitute for professional medical care. Always follow your healthcare professional's instructions.

## 2019-05-30 NOTE — NURSING
Infusion# 2 of 2 cycle 3  Recent labs? Yes    S/S infection noted or voiced? None  Recent or planned surgeries/invasive procedures? Denies  Recent vaccines? Denies    Premeds? Tylenol 500 mg PO and Benadryl 25 mg IVP    IVIG/Privigen 55 Grams (ideal body weight)  administered IV titrated per protocol:    19 ml/hr x 30 mins  38 ml/hr x 30 mins  77 ml/hr x 30 mins  154 ml/hr x 3.5 hours     See MAR & Flow Sheet for more  details. Tolerated well without adverse events

## 2019-05-31 DIAGNOSIS — M35.1 MIXED CONNECTIVE TISSUE DISEASE: ICD-10-CM

## 2019-05-31 DIAGNOSIS — J84.9 ILD (INTERSTITIAL LUNG DISEASE): Primary | ICD-10-CM

## 2019-05-31 RX ORDER — MYCOPHENOLATE MOFETIL 500 MG/1
500 TABLET ORAL 2 TIMES DAILY
Qty: 60 TABLET | Refills: 3 | Status: SHIPPED | OUTPATIENT
Start: 2019-05-31 | End: 2019-05-31

## 2019-05-31 RX ORDER — MYCOPHENOLATE MOFETIL 250 MG/1
500 CAPSULE ORAL 2 TIMES DAILY
Qty: 120 CAPSULE | Refills: 3 | Status: SHIPPED | OUTPATIENT
Start: 2019-05-31 | End: 2019-09-17

## 2019-06-25 ENCOUNTER — INFUSION (OUTPATIENT)
Dept: RHEUMATOLOGY | Facility: HOSPITAL | Age: 69
End: 2019-06-25
Attending: INTERNAL MEDICINE
Payer: MEDICARE

## 2019-06-25 VITALS
DIASTOLIC BLOOD PRESSURE: 70 MMHG | TEMPERATURE: 98 F | RESPIRATION RATE: 18 BRPM | SYSTOLIC BLOOD PRESSURE: 116 MMHG | HEART RATE: 88 BPM | WEIGHT: 141.31 LBS | BODY MASS INDEX: 23.52 KG/M2

## 2019-06-25 DIAGNOSIS — R49.0 DYSPHONIA: ICD-10-CM

## 2019-06-25 DIAGNOSIS — M35.1 MIXED CONNECTIVE TISSUE DISEASE: ICD-10-CM

## 2019-06-25 DIAGNOSIS — R74.8 ELEVATED CK: Primary | ICD-10-CM

## 2019-06-25 PROCEDURE — 96375 TX/PRO/DX INJ NEW DRUG ADDON: CPT

## 2019-06-25 PROCEDURE — 63600175 PHARM REV CODE 636 W HCPCS: Mod: JG | Performed by: INTERNAL MEDICINE

## 2019-06-25 PROCEDURE — 96365 THER/PROPH/DIAG IV INF INIT: CPT

## 2019-06-25 PROCEDURE — 25000003 PHARM REV CODE 250: Performed by: INTERNAL MEDICINE

## 2019-06-25 PROCEDURE — 96366 THER/PROPH/DIAG IV INF ADDON: CPT

## 2019-06-25 RX ORDER — DIPHENHYDRAMINE HYDROCHLORIDE 50 MG/ML
25 INJECTION INTRAMUSCULAR; INTRAVENOUS
Status: CANCELLED | OUTPATIENT
Start: 2019-07-23

## 2019-06-25 RX ORDER — SODIUM CHLORIDE 0.9 % (FLUSH) 0.9 %
10 SYRINGE (ML) INJECTION
Status: CANCELLED | OUTPATIENT
Start: 2019-07-23

## 2019-06-25 RX ORDER — SODIUM CHLORIDE 0.9 % (FLUSH) 0.9 %
10 SYRINGE (ML) INJECTION
Status: DISCONTINUED | OUTPATIENT
Start: 2019-06-25 | End: 2019-06-25 | Stop reason: HOSPADM

## 2019-06-25 RX ORDER — HEPARIN 100 UNIT/ML
500 SYRINGE INTRAVENOUS
Status: CANCELLED | OUTPATIENT
Start: 2019-07-23

## 2019-06-25 RX ORDER — DIPHENHYDRAMINE HCL 25 MG
25 CAPSULE ORAL
Status: CANCELLED | OUTPATIENT
Start: 2019-07-23

## 2019-06-25 RX ORDER — ACETAMINOPHEN 500 MG
500 TABLET ORAL
Status: CANCELLED | OUTPATIENT
Start: 2019-07-23

## 2019-06-25 RX ORDER — ACETAMINOPHEN 500 MG
500 TABLET ORAL
Status: DISCONTINUED | OUTPATIENT
Start: 2019-06-25 | End: 2019-06-25 | Stop reason: HOSPADM

## 2019-06-25 RX ORDER — DIPHENHYDRAMINE HYDROCHLORIDE 50 MG/ML
25 INJECTION INTRAMUSCULAR; INTRAVENOUS
Status: DISCONTINUED | OUTPATIENT
Start: 2019-06-25 | End: 2019-06-25 | Stop reason: HOSPADM

## 2019-06-25 RX ADMIN — ACETAMINOPHEN 500 MG: 500 TABLET ORAL at 09:06

## 2019-06-25 RX ADMIN — DIPHENHYDRAMINE HYDROCHLORIDE 25 MG: 50 INJECTION INTRAMUSCULAR; INTRAVENOUS at 09:06

## 2019-06-25 RX ADMIN — HUMAN IMMUNOGLOBULIN G 55 G: 40 LIQUID INTRAVENOUS at 10:06

## 2019-06-25 NOTE — NURSING
Infusion # 1 of 2 Cycle 4 - Privigen 1 gm/kg q month x 2 consecutive days  (dose based on ideal bodyweight due to national shortage)    Any:  -recent illness, infection, or antibiotic use in past week- denies  -open wounds or mouth sores- denies  -invasive procedures or surgeries in past 4 weeks or in upcoming 4 weeks- denies  -vaccinations in past week- denies  -chance you may be pregnant- n/a      Recent labs? 6/25/19  Last Rheumatology provider visit- Seen by Dr. Thurman on 5/21/19     Premeds? 500 Tylenol PO and 25 mg Benadryl IVP over 3 minutes    Privigen 55 gm administered IV per protocol starting at:  0.5 mg/kg/min-19 ml/hr x 30 minutes  1 mg/kg/min-38 ml/hr x 30 minutes  2 mg/kg/min-77 ml/hr x 30 minutes  4 mg/kg/min-154 ml/hr x 3 hours (remainder of infusion); ; see MAR and vitals for more  details. Tolerated well without adverse events. Discharged and ambulatory out of clinic.

## 2019-06-26 ENCOUNTER — INFUSION (OUTPATIENT)
Dept: RHEUMATOLOGY | Facility: HOSPITAL | Age: 69
End: 2019-06-26
Attending: INTERNAL MEDICINE
Payer: MEDICARE

## 2019-06-26 VITALS
HEART RATE: 90 BPM | RESPIRATION RATE: 18 BRPM | DIASTOLIC BLOOD PRESSURE: 74 MMHG | WEIGHT: 141.13 LBS | SYSTOLIC BLOOD PRESSURE: 129 MMHG | BODY MASS INDEX: 23.48 KG/M2

## 2019-06-26 DIAGNOSIS — R49.0 DYSPHONIA: ICD-10-CM

## 2019-06-26 DIAGNOSIS — M35.1 MIXED CONNECTIVE TISSUE DISEASE: ICD-10-CM

## 2019-06-26 DIAGNOSIS — R74.8 ELEVATED CK: Primary | ICD-10-CM

## 2019-06-26 PROCEDURE — 63600175 PHARM REV CODE 636 W HCPCS: Performed by: INTERNAL MEDICINE

## 2019-06-26 PROCEDURE — 25000003 PHARM REV CODE 250: Performed by: INTERNAL MEDICINE

## 2019-06-26 PROCEDURE — 96366 THER/PROPH/DIAG IV INF ADDON: CPT

## 2019-06-26 PROCEDURE — 96375 TX/PRO/DX INJ NEW DRUG ADDON: CPT

## 2019-06-26 PROCEDURE — A4216 STERILE WATER/SALINE, 10 ML: HCPCS | Performed by: INTERNAL MEDICINE

## 2019-06-26 PROCEDURE — 96365 THER/PROPH/DIAG IV INF INIT: CPT

## 2019-06-26 RX ORDER — DIPHENHYDRAMINE HCL 25 MG
25 CAPSULE ORAL
Status: CANCELLED | OUTPATIENT
Start: 2019-07-24

## 2019-06-26 RX ORDER — ACETAMINOPHEN 500 MG
500 TABLET ORAL
Status: DISCONTINUED | OUTPATIENT
Start: 2019-06-26 | End: 2019-06-26 | Stop reason: HOSPADM

## 2019-06-26 RX ORDER — HEPARIN 100 UNIT/ML
500 SYRINGE INTRAVENOUS
Status: CANCELLED | OUTPATIENT
Start: 2019-07-24

## 2019-06-26 RX ORDER — SODIUM CHLORIDE 0.9 % (FLUSH) 0.9 %
10 SYRINGE (ML) INJECTION
Status: CANCELLED | OUTPATIENT
Start: 2019-07-24

## 2019-06-26 RX ORDER — SODIUM CHLORIDE 0.9 % (FLUSH) 0.9 %
10 SYRINGE (ML) INJECTION
Status: DISCONTINUED | OUTPATIENT
Start: 2019-06-26 | End: 2019-06-26 | Stop reason: HOSPADM

## 2019-06-26 RX ORDER — DIPHENHYDRAMINE HYDROCHLORIDE 50 MG/ML
25 INJECTION INTRAMUSCULAR; INTRAVENOUS
Status: DISCONTINUED | OUTPATIENT
Start: 2019-06-26 | End: 2019-06-26 | Stop reason: HOSPADM

## 2019-06-26 RX ORDER — DIPHENHYDRAMINE HYDROCHLORIDE 50 MG/ML
25 INJECTION INTRAMUSCULAR; INTRAVENOUS
Status: CANCELLED | OUTPATIENT
Start: 2019-07-24

## 2019-06-26 RX ORDER — ACETAMINOPHEN 500 MG
500 TABLET ORAL
Status: CANCELLED | OUTPATIENT
Start: 2019-07-24

## 2019-06-26 RX ADMIN — HUMAN IMMUNOGLOBULIN G 55 G: 40 LIQUID INTRAVENOUS at 09:06

## 2019-06-26 RX ADMIN — ACETAMINOPHEN 500 MG: 500 TABLET ORAL at 09:06

## 2019-06-26 RX ADMIN — Medication 10 ML: at 09:06

## 2019-06-26 RX ADMIN — DIPHENHYDRAMINE HYDROCHLORIDE 25 MG: 50 INJECTION INTRAMUSCULAR; INTRAVENOUS at 09:06

## 2019-06-26 NOTE — NURSING
Infusion# 2 of 2 cycle 4  S/S infection noted or voiced? None noted/denied  Recent labs? reviewed    Premeds? Tylenol 500 mg po, Benadryl 25 mg IVP.    IVIG/Privigen 55 grams (per ideal body wgt d/t national shortage of IVIG). administered IV and titrated per orders see MAR for details.  Tolerated well without adverse events, discharged ambulatory out of clinic.

## 2019-06-26 NOTE — PLAN OF CARE
Problem: Adult Inpatient Plan of Care  Goal: Plan of Care Review  Outcome: Ongoing (interventions implemented as appropriate)  Pt verbalized understanding of infusion POT

## 2019-07-26 ENCOUNTER — TELEPHONE (OUTPATIENT)
Dept: RHEUMATOLOGY | Facility: CLINIC | Age: 69
End: 2019-07-26

## 2019-07-26 NOTE — TELEPHONE ENCOUNTER
Spoke to patient and she expressed concerned because her pulmonologist increased her prednisone.  Dr. Thurman has recently decreased her prednisone and she wanted to check if she should fill her prescription from the pulmonologist.    Discussed patient concerns with Dr. Thurman and he will review her chart and the notes from her pulmonary doctor and then call the patient.

## 2019-07-26 NOTE — TELEPHONE ENCOUNTER
----- Message from Fátima Holcomb sent at 7/26/2019  9:00 AM CDT -----  Contact: Pt  Pt asked that nurse return her call. ([phone])

## 2019-07-30 ENCOUNTER — OFFICE VISIT (OUTPATIENT)
Dept: RHEUMATOLOGY | Facility: CLINIC | Age: 69
End: 2019-07-30
Payer: MEDICARE

## 2019-07-30 ENCOUNTER — LAB VISIT (OUTPATIENT)
Dept: LAB | Facility: HOSPITAL | Age: 69
End: 2019-07-30
Attending: INTERNAL MEDICINE
Payer: MEDICARE

## 2019-07-30 VITALS
WEIGHT: 139.56 LBS | HEIGHT: 65 IN | BODY MASS INDEX: 23.25 KG/M2 | SYSTOLIC BLOOD PRESSURE: 143 MMHG | HEART RATE: 82 BPM | DIASTOLIC BLOOD PRESSURE: 63 MMHG

## 2019-07-30 DIAGNOSIS — Z79.60 LONG-TERM USE OF IMMUNOSUPPRESSANT MEDICATION: ICD-10-CM

## 2019-07-30 DIAGNOSIS — M35.9 POLYMYOSITIS ASSOCIATED WITH AUTOIMMUNE DISEASE: ICD-10-CM

## 2019-07-30 DIAGNOSIS — M81.0 AGE RELATED OSTEOPOROSIS, UNSPECIFIED PATHOLOGICAL FRACTURE PRESENCE: ICD-10-CM

## 2019-07-30 DIAGNOSIS — M33.20 POLYMYOSITIS ASSOCIATED WITH AUTOIMMUNE DISEASE: ICD-10-CM

## 2019-07-30 DIAGNOSIS — M35.1 MIXED CONNECTIVE TISSUE DISEASE: Primary | ICD-10-CM

## 2019-07-30 DIAGNOSIS — R74.8 ELEVATED CK: ICD-10-CM

## 2019-07-30 DIAGNOSIS — D84.9 IMMUNOSUPPRESSION: ICD-10-CM

## 2019-07-30 DIAGNOSIS — J84.9 ILD (INTERSTITIAL LUNG DISEASE): ICD-10-CM

## 2019-07-30 DIAGNOSIS — Z79.52 LONG TERM CURRENT USE OF SYSTEMIC STEROIDS: ICD-10-CM

## 2019-07-30 LAB
ALBUMIN SERPL BCP-MCNC: 3 G/DL (ref 3.5–5.2)
ALP SERPL-CCNC: 66 U/L (ref 55–135)
ALT SERPL W/O P-5'-P-CCNC: 25 U/L (ref 10–44)
ANION GAP SERPL CALC-SCNC: 9 MMOL/L (ref 8–16)
AST SERPL-CCNC: 46 U/L (ref 10–40)
BASOPHILS # BLD AUTO: 0.02 K/UL (ref 0–0.2)
BASOPHILS NFR BLD: 0.3 % (ref 0–1.9)
BILIRUB SERPL-MCNC: 0.5 MG/DL (ref 0.1–1)
BUN SERPL-MCNC: 8 MG/DL (ref 8–23)
CALCIUM SERPL-MCNC: 9.2 MG/DL (ref 8.7–10.5)
CHLORIDE SERPL-SCNC: 106 MMOL/L (ref 95–110)
CK SERPL-CCNC: 862 U/L (ref 20–180)
CO2 SERPL-SCNC: 26 MMOL/L (ref 23–29)
CREAT SERPL-MCNC: 0.8 MG/DL (ref 0.5–1.4)
CRP SERPL-MCNC: 5 MG/L (ref 0–8.2)
DIFFERENTIAL METHOD: ABNORMAL
EOSINOPHIL # BLD AUTO: 0.1 K/UL (ref 0–0.5)
EOSINOPHIL NFR BLD: 0.8 % (ref 0–8)
ERYTHROCYTE [DISTWIDTH] IN BLOOD BY AUTOMATED COUNT: 15.6 % (ref 11.5–14.5)
ERYTHROCYTE [SEDIMENTATION RATE] IN BLOOD BY WESTERGREN METHOD: 104 MM/HR (ref 0–36)
EST. GFR  (AFRICAN AMERICAN): >60 ML/MIN/1.73 M^2
EST. GFR  (NON AFRICAN AMERICAN): >60 ML/MIN/1.73 M^2
GLUCOSE SERPL-MCNC: 89 MG/DL (ref 70–110)
HCT VFR BLD AUTO: 36.7 % (ref 37–48.5)
HGB BLD-MCNC: 11.1 G/DL (ref 12–16)
IMM GRANULOCYTES # BLD AUTO: 0.01 K/UL (ref 0–0.04)
IMM GRANULOCYTES NFR BLD AUTO: 0.2 % (ref 0–0.5)
LYMPHOCYTES # BLD AUTO: 1.2 K/UL (ref 1–4.8)
LYMPHOCYTES NFR BLD: 18.1 % (ref 18–48)
MCH RBC QN AUTO: 25.8 PG (ref 27–31)
MCHC RBC AUTO-ENTMCNC: 30.2 G/DL (ref 32–36)
MCV RBC AUTO: 85 FL (ref 82–98)
MONOCYTES # BLD AUTO: 0.5 K/UL (ref 0.3–1)
MONOCYTES NFR BLD: 7 % (ref 4–15)
NEUTROPHILS # BLD AUTO: 4.9 K/UL (ref 1.8–7.7)
NEUTROPHILS NFR BLD: 73.8 % (ref 38–73)
NRBC BLD-RTO: 0 /100 WBC
PLATELET # BLD AUTO: 232 K/UL (ref 150–350)
PMV BLD AUTO: 10.1 FL (ref 9.2–12.9)
POTASSIUM SERPL-SCNC: 3.5 MMOL/L (ref 3.5–5.1)
PROT SERPL-MCNC: 8.7 G/DL (ref 6–8.4)
RBC # BLD AUTO: 4.3 M/UL (ref 4–5.4)
SODIUM SERPL-SCNC: 141 MMOL/L (ref 136–145)
WBC # BLD AUTO: 6.57 K/UL (ref 3.9–12.7)

## 2019-07-30 PROCEDURE — 36415 COLL VENOUS BLD VENIPUNCTURE: CPT

## 2019-07-30 PROCEDURE — 85025 COMPLETE CBC W/AUTO DIFF WBC: CPT

## 2019-07-30 PROCEDURE — 99215 OFFICE O/P EST HI 40 MIN: CPT | Mod: S$PBB,,, | Performed by: INTERNAL MEDICINE

## 2019-07-30 PROCEDURE — 86140 C-REACTIVE PROTEIN: CPT

## 2019-07-30 PROCEDURE — 85652 RBC SED RATE AUTOMATED: CPT

## 2019-07-30 PROCEDURE — 99215 PR OFFICE/OUTPT VISIT, EST, LEVL V, 40-54 MIN: ICD-10-PCS | Mod: S$PBB,,, | Performed by: INTERNAL MEDICINE

## 2019-07-30 PROCEDURE — 82550 ASSAY OF CK (CPK): CPT

## 2019-07-30 PROCEDURE — 99999 PR PBB SHADOW E&M-EST. PATIENT-LVL V: ICD-10-PCS | Mod: PBBFAC,,, | Performed by: INTERNAL MEDICINE

## 2019-07-30 PROCEDURE — 80053 COMPREHEN METABOLIC PANEL: CPT

## 2019-07-30 PROCEDURE — 99215 OFFICE O/P EST HI 40 MIN: CPT | Mod: PBBFAC,25 | Performed by: INTERNAL MEDICINE

## 2019-07-30 PROCEDURE — 99999 PR PBB SHADOW E&M-EST. PATIENT-LVL V: CPT | Mod: PBBFAC,,, | Performed by: INTERNAL MEDICINE

## 2019-07-30 RX ORDER — MEPERIDINE HYDROCHLORIDE 50 MG/ML
25 INJECTION INTRAMUSCULAR; INTRAVENOUS; SUBCUTANEOUS
Status: CANCELLED | OUTPATIENT
Start: 2019-07-30

## 2019-07-30 RX ORDER — ACETAMINOPHEN 325 MG/1
650 TABLET ORAL
Status: CANCELLED | OUTPATIENT
Start: 2019-07-30

## 2019-07-30 RX ORDER — SODIUM CHLORIDE 0.9 % (FLUSH) 0.9 %
10 SYRINGE (ML) INJECTION
Status: CANCELLED | OUTPATIENT
Start: 2019-07-30

## 2019-07-30 RX ORDER — DIPHENHYDRAMINE HCL 25 MG
25 CAPSULE ORAL
Status: CANCELLED | OUTPATIENT
Start: 2019-07-30

## 2019-07-30 RX ORDER — HEPARIN 100 UNIT/ML
500 SYRINGE INTRAVENOUS
Status: CANCELLED | OUTPATIENT
Start: 2019-07-30

## 2019-07-30 NOTE — PROGRESS NOTES
RHEUMATOLOGY OUTPATIENT CLINIC NOTE    7/30/2019    Attending Rheumatologist: Zachariah Thurman  Primary Care Provider: Ariana Rebollar MD   Physician Requesting Consultation: No referring provider defined for this encounter.  Chief Complaint/Reason For Consultation:  MCTD.    Subjective:       HPI  Devi Fernandes is a 68 y.o. Black or  female MCTD with ILD comes for follow up.    Today:  Last seen on May.  Patient initiated IVIG therapy for myositis related to mixed connective tissue disease.  Since initiating therapy on April, his shin has not perceive any improvement to her nasopharyngeal symptoms.  Actually complaints of several MSK symptoms after her infusions.  Seen recently by her pulmonary doctor, was recommended to increase prednisone to 20 mg per day due to worsening DLCO and dyspnea.  Patient voices no acute complaints today.  Reports worsening dysphonia and intermittent dysphagia.  Denies any perceived muscle weakness.  Reports dyspnea on exertion is stable.  Not have fever, rash,  or GI complaints.  Has not yet increased prednisone as recommended by her pulmonary doctor.  Taking CellCept as prescribed without any particular side effects.    Review of Systems   Constitutional: Negative for chills and fever.   HENT:        Denies eye or mouth sicca symptoms, denies oral ulcers, denies parotid swelling, denies swollen glands.   Eyes: Negative for pain and redness.   Respiratory: Positive for cough and shortness of breath (Chronic, stable).    Cardiovascular: Negative for chest pain and leg swelling.   Gastrointestinal: Negative for abdominal pain, blood in stool and heartburn.        + dysphagia and dysphonia, chronic.   Genitourinary: Negative for dysuria, hematuria and urgency.   Musculoskeletal: Positive for joint pain (Mostly right knee, mechanical pattern.). Negative for back pain.   Skin: Negative for rash.        Denies photosensitivity, denies alopecia, denies  sclerodactyly,+ Raynaud's phenomenon,denies digital ulcers, no psoriasis, ulcerations, no purpura, denies nodules.   Neurological: Negative for tingling and focal weakness.   Endo/Heme/Allergies: Does not bruise/bleed easily.   Psychiatric/Behavioral: Negative for substance abuse. The patient does not have insomnia.    All other systems reviewed and are negative.      Chronic comorbid conditions affecting medical decision making today:  Past Medical History:   Diagnosis Date    Allergy     Aortic regurgitation     Arthritis     Deviated nasal septum     Diabetes mellitus     Dysphagia     GERD (gastroesophageal reflux disease)     Hypertension     PAOLA (obstructive sleep apnea)     Pulmonary fibrosis     Pulmonary hypertension     Pulmonic regurgitation     Restrictive lung disease     Sinus bradycardia     Valvular heart disease    · MCTD (MSK manifestations, RP, Sicca, + Ab, lung fibrosis, pHTN)  · Hypertension  · PAOLA    Past Surgical History:   Procedure Laterality Date    APPENDECTOMY       SECTION      COLONOSCOPY  2011    ESOPHAGEAL MANOMETRY  2018    GASTROSCOPY  02/15/2018     Family History   Problem Relation Age of Onset    Cancer Mother     Diabetes Mellitus Father      Social History     Substance and Sexual Activity   Alcohol Use No    Frequency: Never     Social History     Tobacco Use   Smoking Status Never Smoker   Smokeless Tobacco Never Used     Social History     Substance and Sexual Activity   Drug Use No       Current Outpatient Medications:     amLODIPine (NORVASC) 5 MG tablet, Take 5 mg by mouth once daily., Disp: , Rfl:     carvedilol (COREG) 3.125 MG tablet, Take 3.125 mg by mouth 2 (two) times daily with meals., Disp: , Rfl:     folic acid (FOLVITE) 800 MCG Tab, Take 800 mcg by mouth once daily., Disp: , Rfl:     hypromellose (SYSTANE GEL OPHT), Apply 1 drop to eye 3 (three) times daily. I gel drop to each eye TID, Disp: , Rfl:      mycophenolate (CELLCEPT) 250 mg Cap, Take 2 capsules (500 mg total) by mouth 2 (two) times daily., Disp: 120 capsule, Rfl: 3    pantoprazole (PROTONIX) 20 MG tablet, Take 20 mg by mouth 2 (two) times daily before meals., Disp: , Rfl:     predniSONE (DELTASONE) 5 MG tablet, Take 5 mg by mouth once daily., Disp: , Rfl:     losartan (COZAAR) 50 MG tablet, Take 50 mg by mouth once daily., Disp: , Rfl:      Objective:         Vitals:    07/30/19 0843   BP: (!) 143/63   Pulse: 82     Physical Exam   Nursing note and vitals reviewed.  Constitutional: She is oriented to person, place, and time and well-developed, well-nourished, and in no distress. No distress.   HENT:   Head: Normocephalic.   + dysphonic voice.   Eyes: Conjunctivae are normal. Pupils are equal, round, and reactive to light.   Absent Episcleritis/scleritis.   Neck: Normal range of motion.   Cardiovascular: Normal rate and intact distal pulses.    Pulmonary/Chest: Effort normal. No respiratory distress.   Abdominal: Soft. She exhibits no distension.   Neurological: She is alert and oriented to person, place, and time.   absent sensory deficits  muscle strength 5/5 through.    Skin: No rash noted.     No Skin fibrosis, teleangiectasias, rashes, discoid lesions, purpura, skin ulcers, nodules, or livedo reticularis, nail pitting.   Psychiatric:   Anxious.   Musculoskeletal: Normal range of motion. She exhibits tenderness (Slight tenderness to palpation right thumb CMC). She exhibits no edema or deformity.   : strong  No synovitis.    AROM: intact  PROM: intact    Devices used by patient: none       Reviewed old and all outside pertinent medical records available.    All lab results personally reviewed and interpreted by me.  Lab Results   Component Value Date    WBC 6.57 07/30/2019    HGB 11.1 (L) 07/30/2019    HCT 36.7 (L) 07/30/2019    MCV 85 07/30/2019    MCH 25.8 (L) 07/30/2019    MCHC 30.2 (L) 07/30/2019    RDW 15.6 (H) 07/30/2019      07/30/2019    MPV 10.1 07/30/2019       Lab Results   Component Value Date     07/30/2019    K 3.5 07/30/2019     07/30/2019    CO2 26 07/30/2019    GLU 89 07/30/2019    BUN 8 07/30/2019    CALCIUM 9.2 07/30/2019    PROT 8.7 (H) 07/30/2019    ALBUMIN 3.0 (L) 07/30/2019    BILITOT 0.5 07/30/2019    AST 46 (H) 07/30/2019    ALKPHOS 66 07/30/2019    ALT 25 07/30/2019       No results found for: COLORU, APPEARANCEUA, SPECGRAV, PHUR, PROTEINUA, GLUCOSEU, KETONESU, BLOODU, LEUKOCYTESUR, NITRITE, UROBILINOGEN    Lab Results   Component Value Date    CRP 5.0 07/30/2019       Lab Results   Component Value Date    SEDRATE 99 (H) 06/25/2019       Lab Results   Component Value Date    RF <10.0 02/06/2019    SEDRATE 99 (H) 06/25/2019       No components found for: 25OHVITDTOT, 40YLYZCS0, 03OKCNPV9, METHODNOTE    No results found for: URICACID    No components found for: TSPOTTB    · CK: 981 (3/2019)-> 969 (4/2019)-> 330 (5/2019)-> 5558-> 862  · Aldolase: 12.8 (3/2019)-> pending  · LDH: 333    Rheum Labs:  ISMAEL 1 in 320 speckled pattern  RNP 6.2 (reference less than 1)  SSA/B negative  Anti histone 3.5 (+)  Anti Smith negative  SCL 70 antibody negative  C3/4 within normal range  Double-stranded DNA negative  ESR 63-> 57  CRP 0.2 -> 10.5  ANCA negative, MPO/KY 3 negative  Myomarker panel3:  (ref <20): Anti Abigail 1: 31. EJ weak +. MDMA5: 31. U1-RNP 50.    Infectious Labs:  Hepatitis profile nonreactive    Imaging:  All imaging reviewed and independently  interpreted by me.    CT maxillofacial June 2016  Septal deviation.  Mucous cyst right maxillary sinus.    CT chest November 2016  Patchy ground-glass opacities, improvement compared to previous imaging.    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  Black or  female with:    1.  Mixed connective tissue disease  - associated with ILD and pHTN, being managed by outside pulmonologist  - elevated muscle enzymes, chronic dysphonia, dysphagia noted with Velopharyngeal insufficiency and soft palate paralysis   - patient with positive synthetase antibodies, in addition of MDMA5 with denotes guarded prognosis and refractory therapy  - unfortunately no response to IVIG initiated on April.  Will discontinue today.  Documented for worsened ILD by her lung doctor.  - increased prednisone as previously recommended by her pulmonologist, will recommend for trial of rituximab for both probable myositis with nasopharyngeal involvement and ILD.  - attempted to reach patient's pulmonologist (Dr. Kenneth Arteaga, [phone]), not available in clinic today.  - message left to have recent PFT and any imaging performed, along with consideration to start rituximab therapy.  - will discontinue CellCept once start rituximab.  Due to scheduling issues, patient unable to start therapy until September.  - clinical course of ILD and disease progression discussed in great detail.  Verbalized understanding.  - recommend to consider pHTN as an etiology for her symptoms and DLCO worsening.    2. Chronic DMARD use  - monitor for toxicity  - CBC, CMP  - QuantiFERON TB pending.  Recommend performing prior rituximab therapy.    3. Other specified counseling  - over 10 minutes spent regarding below topics:  - DXA for bone health.  Long term steroid probable, will consider for prophylactic bone antiresorptive therapy (of note, patient with dysphagia).  - Immunization counseling done.   - Medication counseling provided.  - age appropriate malignancy screening per PMD recommendations    RTC 1 months    Method of contact with patient concerns: [facility]    Disclaimer:  This note is prepared using voice recognition software and as such is likely to have errors and has not  been proof read. Please contact me for questions.     Time spent: 40 minutes in face to face discussion concerning diagnosis, prognosis, review of lab and test results, benefits of treatment as well as management of disease, counseling of patient and coordination of care between various health care providers.  Greater than half the time spent was used for coordination of care and counseling of patient.    Zachariah Thurman M.D.  Rheumatology Department   [facility]

## 2019-07-30 NOTE — PATIENT INSTRUCTIONS
Rituximab injection  What is this medicine?  RITUXIMAB (ri TUX i mab) is a monoclonal antibody. It is used commonly to treat non-Hodgkin lymphoma and other conditions. It is also used to treat rheumatoid arthritis (RA). In RA, this medicine slows the inflammatory process and help reduce joint pain and swelling. This medicine is often used with other cancer or arthritis medications.  How should I use this medicine?  This medicine is for infusion into a vein. It is administered in a hospital or clinic by a specially trained health care professional.  A special MedGuide will be given to you by the pharmacist with each prescription and refill. Be sure to read this information carefully each time.  Talk to your pediatrician regarding the use of this medicine in children. This medicine is not approved for use in children.  What side effects may I notice from receiving this medicine?  Side effects that you should report to your doctor or health care professional as soon as possible:  · allergic reactions like skin rash, itching or hives, swelling of the face, lips, or tongue  · low blood counts - this medicine may decrease the number of white blood cells, red blood cells and platelets. You may be at increased risk for infections and bleeding.  · signs of infection - fever or chills, cough, sore throat, pain or difficulty passing urine  · signs of decreased platelets or bleeding - bruising, pinpoint red spots on the skin, black, tarry stools, blood in the urine  · signs of decreased red blood cells - unusually weak or tired, fainting spells, lightheadedness  · breathing problems  · confused, not responsive  · chest pain  · fast, irregular heartbeat  · feeling faint or lightheaded, falls  · mouth sores  · redness, blistering, peeling or loosening of the skin, including inside the mouth  · stomach pain  · swelling of the ankles, feet, or hands  · trouble passing urine or change in the amount of urine  Side effects that  usually do not require medical attention (report to your doctor or other health care professional if they continue or are bothersome):  · anxiety  · headache  · loss of appetite  · muscle aches  · nausea  · night sweats  What may interact with this medicine?  · cisplatin  · medicines for blood pressure  · some other medicines for arthritis  · vaccines  What if I miss a dose?  It is important not to miss a dose. Call your doctor or health care professional if you are unable to keep an appointment.  Where should I keep my medicine?  This drug is given in a hospital or clinic and will not be stored at home.  What should I tell my health care provider before I take this medicine?  They need to know if you have any of these conditions:  · blood disorders  · heart disease  · history of hepatitis B  · infection (especially a virus infection such as chickenpox, cold sores, or herpes)  · irregular heartbeat  · kidney disease  · lung or breathing disease, like asthma  · lupus  · an unusual or allergic reaction to rituximab, mouse proteins, other medicines, foods, dyes, or preservatives  · pregnant or trying to get pregnant  · breast-feeding  What should I watch for while using this medicine?  Report any side effects that you notice during your treatment right away, such as changes in your breathing, fever, chills, dizziness or lightheadedness. These effects are more common with the first dose.  Visit your prescriber or health care professional for checks on your progress. You will need to have regular blood work. Report any other side effects. The side effects of this medicine can continue after you finish your treatment. Continue your course of treatment even though you feel ill unless your doctor tells you to stop.  Call your doctor or health care professional for advice if you get a fever, chills or sore throat, or other symptoms of a cold or flu. Do not treat yourself. This drug decreases your body's ability to fight  infections. Try to avoid being around people who are sick.  This medicine may increase your risk to bruise or bleed. Call your doctor or health care professional if you notice any unusual bleeding.  Be careful brushing and flossing your teeth or using a toothpick because you may get an infection or bleed more easily. If you have any dental work done, tell your dentist you are receiving this medicine.  Avoid taking products that contain aspirin, acetaminophen, ibuprofen, naproxen, or ketoprofen unless instructed by your doctor. These medicines may hide a fever.  Do not become pregnant while taking this medicine. Women should inform their doctor if they wish to become pregnant or think they might be pregnant. There is a potential for serious side effects to an unborn child. Talk to your health care professional or pharmacist for more information. Do not breast-feed an infant while taking this medicine.  NOTE:This sheet is a summary. It may not cover all possible information. If you have questions about this medicine, talk to your doctor, pharmacist, or health care provider. Copyright© 2017 [employer]        Alendronate oral solution  What is this medicine?  ALENDRONATE (a YOSELIN droe harish) slows calcium loss from bones. It helps to make normal healthy bone and to slow bone loss in people with Paget's disease and osteoporosis. It may be used in others at risk for bone loss.  How should I use this medicine?  You must take this medicine exactly as directed or you will lower the amount of the medicine you absorb into your body or you may cause yourself harm. Take your dose by mouth first thing in the morning, after you are up for the day. Do not eat or drink anything before you take this solution. Use a specially marked spoon or container to measure the oral solution. Take the solution with 2 fluid ounces (1/4 cup) of plain water. Do not take the solution with any other drink. After taking this medicine do not eat  breakfast, drink, or take any other medicines or vitamins for at least 30 minutes. Stand or sit up for at least 30 minutes after you take this medicine; do not lie down. Do not take your medicine more often than directed. Take this medicine on the same day every week.  Talk to your pediatrician regarding the use of this medicine in children. Special care may be needed.  What side effects may I notice from receiving this medicine?  Side effects that you should report to your doctor or health care professional as soon as possible:  · allergic reactions like skin rash, itching or hives, swelling of the face, lips, or tongue  · black or tarry stools  · bone, muscle, or joint pain  · changes in vision  · chest pain  · heartburn or stomach pain  · jaw pain, especially after dental work  · redness, blistering, peeling or loosening of the skin, including inside the mouth  · trouble or pain when swallowing  Side effects that usually do not require medical attention (report to your doctor or health care professional if they continue or are bothersome):  · changes in taste  · diarrhea or constipation  · eye pain or itching  · headache  · nausea or vomiting  · stomach gas or fullness  What may interact with this medicine?  · aluminum hydroxide  · antacids  · aspirin  · calcium supplements  · drugs for inflammation like ibuprofen, naproxen, and others  · iron supplements  · magnesium supplements  · vitamins with minerals  What if I miss a dose?  If you miss a dose, take the dose on the morning after you remember. Then take your next dose on your regular day of the week. Never take 2 doses on the same day. Do not take double or extra doses.  Where should I keep my medicine?  Keep out of the reach of children.  Store at room temperature of 15 and 30 degrees C (59 and 86 degrees F). Throw away any unused medicine after the expiration date.  What should I tell my health care provider before I take this medicine?  They need to know  if you have any of these conditions:  · esophagus, stomach, or intestine problems, like acid reflux or GERD  · dental disease  · kidney disease  · low blood calcium  · low vitamin D  · problems swallowing  · problems sitting or standing for 30 minutes  · an unusual or allergic reaction to alendronate, other medicines, foods, dyes, or preservatives  · pregnant or trying to get pregnant  · breast-feeding  What should I watch for while using this medicine?  Visit your doctor for regular check ups. It may be some time before you see benefit from this medicine. Do not stop taking your medicine unless your doctor tells you to. Your doctor or health care professional may order regular blood tests to see how you are doing.  You should make sure you get enough calcium and vitamin D in your diet while you are taking this medicine, unless your doctor tells you not to. Discuss the foods you eat and the vitamins you take with your health care professional.  Some people who take this medicine have severe bone, joint, and/or muscle pain. This medicine may also increase your risk for a broken thigh bone. Tell your doctor right away if you have pain in your upper leg or groin. Tell your doctor if you have any pain that does not go away or that gets worse.  This medicine can make you more sensitive to the sun. If you get a rash while taking this medicine, sunlight may cause the rash to get worse. Keep out of the sun. If you cannot avoid being in the sun, wear protective clothing and use sunscreen. Do not use sun lamps or tanning beds/booths.  NOTE:This sheet is a summary. It may not cover all possible information. If you have questions about this medicine, talk to your doctor, pharmacist, or health care provider. Copyright© 2017 [employer]

## 2019-08-02 ENCOUNTER — TELEPHONE (OUTPATIENT)
Dept: RHEUMATOLOGY | Facility: CLINIC | Age: 69
End: 2019-08-02

## 2019-08-02 NOTE — TELEPHONE ENCOUNTER
Spoke with patient. She will go to [facility]  On  [address] for T-Spot on 8-7 order faxed to lab at [phone].

## 2019-08-02 NOTE — TELEPHONE ENCOUNTER
----- Message from Zachariah Thurman MD sent at 8/2/2019 10:00 AM CDT -----  Please contact the patient and inform I reviewed the lab results.  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  Recommend for TB spot testing at her earliest convenience (performed at the [facility] facility) for reassurance prior rituximab therapy.  For any questions or concerns, do not hesitate to contact me; and have the patient call the office or send a message through the patient portal for any concerns.    Thank you very much!    Sincerely,    Zachariah Thurman  Rheumatology Department   [facility]

## 2019-08-06 ENCOUNTER — TELEPHONE (OUTPATIENT)
Dept: OBSTETRICS AND GYNECOLOGY | Facility: CLINIC | Age: 69
End: 2019-08-06

## 2019-08-06 ENCOUNTER — TELEPHONE (OUTPATIENT)
Dept: RHEUMATOLOGY | Facility: CLINIC | Age: 69
End: 2019-08-06

## 2019-08-06 NOTE — TELEPHONE ENCOUNTER
----- Message from Viky Gonzalez sent at 8/6/2019  2:08 PM CDT -----  Contact: [facility] lab in [place]  Type:  Needs Medical Advice    Who Called: vladimir  Symptoms (please be specific): n/a   How long has patient had these symptoms: n/a  Pharmacy name and phone #:n/a  Would the patient rather a call back or a response via [facility]? Call back   Best Call Back Number: [phone]  Additional Information: requesting call back regarding status of order for pt to have labs done. Fax number is [phone].    Thanks,  Viky Gonzalez

## 2019-08-06 NOTE — TELEPHONE ENCOUNTER
----- Message from Barby Bush sent at 8/6/2019 10:24 AM CDT -----  Contact: /Pathology Group  Please call  @ [phone] regarding pt lab order, states pt is at clinic, but no orders.

## 2019-08-06 NOTE — TELEPHONE ENCOUNTER
Patient went to [facility] clinic this morning for T-Spot. Orders faxed to [facility] pathology lab at [phone].

## 2019-08-20 RX ORDER — DIPHENHYDRAMINE HCL 25 MG
25 CAPSULE ORAL EVERY 6 HOURS PRN
Status: CANCELLED
Start: 2019-08-20

## 2019-08-27 ENCOUNTER — TELEPHONE (OUTPATIENT)
Dept: RHEUMATOLOGY | Facility: CLINIC | Age: 69
End: 2019-08-27

## 2019-08-27 NOTE — TELEPHONE ENCOUNTER
----- Message from Fátima Holcomb sent at 8/27/2019 10:37 AM CDT -----  Contact: [place] [facility] [place] a diagnoses code for pt. Please contact Olivia at ([phone])

## 2019-09-03 ENCOUNTER — OFFICE VISIT (OUTPATIENT)
Dept: RHEUMATOLOGY | Facility: CLINIC | Age: 69
End: 2019-09-03
Payer: MEDICARE

## 2019-09-03 ENCOUNTER — INFUSION (OUTPATIENT)
Dept: RHEUMATOLOGY | Facility: HOSPITAL | Age: 69
End: 2019-09-03
Attending: INTERNAL MEDICINE
Payer: MEDICARE

## 2019-09-03 VITALS
TEMPERATURE: 97 F | RESPIRATION RATE: 14 BRPM | HEART RATE: 71 BPM | BODY MASS INDEX: 22.64 KG/M2 | OXYGEN SATURATION: 100 % | WEIGHT: 136 LBS | DIASTOLIC BLOOD PRESSURE: 64 MMHG | SYSTOLIC BLOOD PRESSURE: 124 MMHG

## 2019-09-03 DIAGNOSIS — M35.9 POLYMYOSITIS ASSOCIATED WITH AUTOIMMUNE DISEASE: ICD-10-CM

## 2019-09-03 DIAGNOSIS — M35.1 MIXED CONNECTIVE TISSUE DISEASE: ICD-10-CM

## 2019-09-03 DIAGNOSIS — Z79.60 LONG-TERM USE OF IMMUNOSUPPRESSANT MEDICATION: ICD-10-CM

## 2019-09-03 DIAGNOSIS — Z71.89 COUNSELING ON HEALTH PROMOTION AND DISEASE PREVENTION: ICD-10-CM

## 2019-09-03 DIAGNOSIS — J84.9 ILD (INTERSTITIAL LUNG DISEASE): Primary | ICD-10-CM

## 2019-09-03 DIAGNOSIS — R74.8 ELEVATED CK: ICD-10-CM

## 2019-09-03 DIAGNOSIS — R20.2 PARESTHESIAS: ICD-10-CM

## 2019-09-03 DIAGNOSIS — M33.20 POLYMYOSITIS ASSOCIATED WITH AUTOIMMUNE DISEASE: ICD-10-CM

## 2019-09-03 PROCEDURE — 96415 CHEMO IV INFUSION ADDL HR: CPT

## 2019-09-03 PROCEDURE — 25000003 PHARM REV CODE 250: Performed by: INTERNAL MEDICINE

## 2019-09-03 PROCEDURE — 96413 CHEMO IV INFUSION 1 HR: CPT

## 2019-09-03 PROCEDURE — 99214 PR OFFICE/OUTPT VISIT, EST, LEVL IV, 30-39 MIN: ICD-10-PCS | Mod: S$PBB,,, | Performed by: INTERNAL MEDICINE

## 2019-09-03 PROCEDURE — 96367 TX/PROPH/DG ADDL SEQ IV INF: CPT

## 2019-09-03 PROCEDURE — 63600175 PHARM REV CODE 636 W HCPCS: Performed by: INTERNAL MEDICINE

## 2019-09-03 PROCEDURE — S0028 INJECTION, FAMOTIDINE, 20 MG: HCPCS | Performed by: INTERNAL MEDICINE

## 2019-09-03 PROCEDURE — 99214 OFFICE O/P EST MOD 30 MIN: CPT | Mod: S$PBB,,, | Performed by: INTERNAL MEDICINE

## 2019-09-03 PROCEDURE — A4216 STERILE WATER/SALINE, 10 ML: HCPCS | Performed by: INTERNAL MEDICINE

## 2019-09-03 PROCEDURE — 96375 TX/PRO/DX INJ NEW DRUG ADDON: CPT

## 2019-09-03 RX ORDER — FAMOTIDINE 20 MG/50ML
20 INJECTION, SOLUTION INTRAVENOUS 2 TIMES DAILY
Status: CANCELLED
Start: 2019-09-17

## 2019-09-03 RX ORDER — DIPHENHYDRAMINE HCL 25 MG
25 CAPSULE ORAL
Status: COMPLETED | OUTPATIENT
Start: 2019-09-03 | End: 2019-09-03

## 2019-09-03 RX ORDER — HEPARIN 100 UNIT/ML
500 SYRINGE INTRAVENOUS
Status: CANCELLED | OUTPATIENT
Start: 2019-09-17

## 2019-09-03 RX ORDER — FAMOTIDINE 20 MG/50ML
20 INJECTION, SOLUTION INTRAVENOUS 2 TIMES DAILY
Status: DISCONTINUED | OUTPATIENT
Start: 2019-09-03 | End: 2019-09-03 | Stop reason: HOSPADM

## 2019-09-03 RX ORDER — DIPHENHYDRAMINE HCL 25 MG
25 CAPSULE ORAL
Status: CANCELLED | OUTPATIENT
Start: 2019-09-17

## 2019-09-03 RX ORDER — SODIUM CHLORIDE 0.9 % (FLUSH) 0.9 %
10 SYRINGE (ML) INJECTION
Status: CANCELLED | OUTPATIENT
Start: 2019-09-17

## 2019-09-03 RX ORDER — ACETAMINOPHEN 325 MG/1
650 TABLET ORAL
Status: CANCELLED | OUTPATIENT
Start: 2019-09-17

## 2019-09-03 RX ORDER — SODIUM CHLORIDE 0.9 % (FLUSH) 0.9 %
10 SYRINGE (ML) INJECTION
Status: DISCONTINUED | OUTPATIENT
Start: 2019-09-03 | End: 2019-09-03 | Stop reason: HOSPADM

## 2019-09-03 RX ORDER — ACETAMINOPHEN 325 MG/1
650 TABLET ORAL
Status: COMPLETED | OUTPATIENT
Start: 2019-09-03 | End: 2019-09-03

## 2019-09-03 RX ORDER — DIPHENHYDRAMINE HCL 25 MG
25 CAPSULE ORAL EVERY 6 HOURS PRN
Status: CANCELLED
Start: 2019-09-17

## 2019-09-03 RX ORDER — FAMOTIDINE 20 MG/50ML
20 INJECTION, SOLUTION INTRAVENOUS 2 TIMES DAILY
Status: CANCELLED
Start: 2019-09-03

## 2019-09-03 RX ORDER — GABAPENTIN 300 MG/1
300 CAPSULE ORAL NIGHTLY
Qty: 90 CAPSULE | Refills: 3 | Status: SHIPPED | OUTPATIENT
Start: 2019-09-03 | End: 2019-09-17

## 2019-09-03 RX ORDER — MEPERIDINE HYDROCHLORIDE 50 MG/ML
25 INJECTION INTRAMUSCULAR; INTRAVENOUS; SUBCUTANEOUS
Status: CANCELLED | OUTPATIENT
Start: 2019-09-17

## 2019-09-03 RX ADMIN — FAMOTIDINE 20 MG: 20 INJECTION, SOLUTION INTRAVENOUS at 09:09

## 2019-09-03 RX ADMIN — ACETAMINOPHEN 650 MG: 325 TABLET ORAL at 09:09

## 2019-09-03 RX ADMIN — Medication 10 ML: at 09:09

## 2019-09-03 RX ADMIN — HYDROCORTISONE SODIUM SUCCINATE 100 MG: 100 INJECTION, POWDER, FOR SOLUTION INTRAMUSCULAR; INTRAVENOUS at 09:09

## 2019-09-03 RX ADMIN — RITUXIMAB 1000 MG: 10 INJECTION, SOLUTION INTRAVENOUS at 10:09

## 2019-09-03 RX ADMIN — DIPHENHYDRAMINE HYDROCHLORIDE 25 MG: 25 CAPSULE ORAL at 09:09

## 2019-09-03 NOTE — NURSING
Infusion# 1 Rituxan (1 of 2)     Recent labs? 9/3/19    S/S infection noted or voiced? Denied/none noted  Recent or planned surgeries/invasive procedures? Denied  Recent vaccines? Denied    Premeds? Acetaminophen 650 mg PO, Pepcid 20 mg IV over 20 minutes, Benadryl 25 mg IVP over 3 minutes, and Hydrocortisone sodium succinate 100 mg IVP over 3 minutes.     Rituxan 1,000 mg administered IV at a titrated rate per orders; see MAR & Flow Sheet for more  details. Tolerated well without adverse events.    Note:  Consent is scanned into the chart for Rituxan. Dr. Thurman saw patient in infusion suite.  Patient would like to wait until next infusion to get flu vaccine - Dr. Thurman notified - and he verbalized approval to give the flu vaccine the next infusion.

## 2019-09-03 NOTE — PROGRESS NOTES
RHEUMATOLOGY OUTPATIENT CLINIC NOTE    9/3/2019    Attending Rheumatologist: Zachariah Thurman  Primary Care Provider: Ariana Rebollar MD   Physician Requesting Consultation: No referring provider defined for this encounter.  Chief Complaint/Reason For Consultation:  MCTD.    Subjective:       HPI  Devi Fernandes is a 68 y.o. Black or  female MCTD with ILD comes for follow up.    Today:  Last seen on July.  Patient with no acute complaints, main issue remained dose of her initial symptoms.  Discussed with her pulmonologist, objective evidence reported for worsening ILD.  Rituximab recommended, patient was unable to schedule until today.  Main complaint is stable nasopharyngeal reflux and dysphonia.  Denies any difficulty with ADLs because of weakness.  Does not have any fever or cough.  Denies joint pain, rash,  or GI complaints.    Review of Systems   Constitutional: Negative for chills and fever.   HENT:        Denies eye or mouth sicca symptoms, denies oral ulcers, denies parotid swelling, denies swollen glands.   Eyes: Negative for pain and redness.   Respiratory: Negative for cough and shortness of breath.    Cardiovascular: Negative for chest pain and leg swelling.   Gastrointestinal: Negative for abdominal pain and blood in stool.        + dysphagia and dysphonia, chronic.   Genitourinary: Negative for dysuria and hematuria.   Musculoskeletal: Negative for back pain and joint pain.   Skin: Negative for rash.   Neurological: Negative for tingling and focal weakness.   Endo/Heme/Allergies: Does not bruise/bleed easily.   Psychiatric/Behavioral: Negative for substance abuse. The patient does not have insomnia.    All other systems reviewed and are negative.      Chronic comorbid conditions affecting medical decision making today:  Past Medical History:   Diagnosis Date    Allergy     Aortic regurgitation     Arthritis     Deviated nasal septum     Diabetes mellitus      Dysphagia     GERD (gastroesophageal reflux disease)     Hypertension     PAOLA (obstructive sleep apnea)     Pulmonary fibrosis     Pulmonary hypertension     Pulmonic regurgitation     Restrictive lung disease     Sinus bradycardia     Valvular heart disease    · MCTD (MSK manifestations, RP, Sicca, + Ab, lung fibrosis, pHTN)  · Hypertension  · PAOLA    Past Surgical History:   Procedure Laterality Date    APPENDECTOMY       SECTION      COLONOSCOPY  2011    ESOPHAGEAL MANOMETRY  2018    GASTROSCOPY  02/15/2018     Family History   Problem Relation Age of Onset    Cancer Mother     Diabetes Mellitus Father      Social History     Substance and Sexual Activity   Alcohol Use No    Frequency: Never     Social History     Tobacco Use   Smoking Status Never Smoker   Smokeless Tobacco Never Used     Social History     Substance and Sexual Activity   Drug Use No       Current Outpatient Medications:     amLODIPine (NORVASC) 5 MG tablet, Take 5 mg by mouth once daily., Disp: , Rfl:     carvedilol (COREG) 3.125 MG tablet, Take 3.125 mg by mouth 2 (two) times daily with meals., Disp: , Rfl:     folic acid (FOLVITE) 800 MCG Tab, Take 800 mcg by mouth once daily., Disp: , Rfl:     hypromellose (SYSTANE GEL OPHT), Apply 1 drop to eye 3 (three) times daily. I gel drop to each eye TID, Disp: , Rfl:     losartan (COZAAR) 50 MG tablet, Take 50 mg by mouth once daily., Disp: , Rfl:     mycophenolate (CELLCEPT) 250 mg Cap, Take 2 capsules (500 mg total) by mouth 2 (two) times daily., Disp: 120 capsule, Rfl: 3    pantoprazole (PROTONIX) 20 MG tablet, Take 20 mg by mouth 2 (two) times daily before meals., Disp: , Rfl:     predniSONE (DELTASONE) 5 MG tablet, Take 5 mg by mouth once daily., Disp: , Rfl:      Objective:         There were no vitals filed for this visit.  Physical Exam   Nursing note and vitals reviewed.  Constitutional: She is oriented to person, place, and time and  well-developed, well-nourished, and in no distress. No distress.   HENT:   Head: Normocephalic.   + dysphonic voice.   Eyes: Conjunctivae are normal. Pupils are equal, round, and reactive to light.   Absent Episcleritis/scleritis.   Neck: Normal range of motion.   Cardiovascular: Normal rate and intact distal pulses.    Pulmonary/Chest: Effort normal. No respiratory distress.   Abdominal: Soft. She exhibits no distension.   Neurological: She is alert and oriented to person, place, and time.   absent sensory deficits  muscle strength 5/5 through.   Skin: No rash noted. No erythema.     Musculoskeletal: Normal range of motion. She exhibits no edema or deformity.   : strong  No synovitis or significant stress tenderness.    AROM: intact  PROM: intact    Devices used by patient: none       Reviewed old and all outside pertinent medical records available.    All lab results personally reviewed and interpreted by me.  Lab Results   Component Value Date    WBC 5.61 09/03/2019    HGB 11.3 (L) 09/03/2019    HCT 37.9 09/03/2019    MCV 86 09/03/2019    MCH 25.5 (L) 09/03/2019    MCHC 29.8 (L) 09/03/2019    RDW 15.8 (H) 09/03/2019     09/03/2019    MPV 9.6 09/03/2019       Lab Results   Component Value Date     07/30/2019    K 3.5 07/30/2019     07/30/2019    CO2 26 07/30/2019    GLU 89 07/30/2019    BUN 8 07/30/2019    CALCIUM 9.2 07/30/2019    PROT 8.7 (H) 07/30/2019    ALBUMIN 3.0 (L) 07/30/2019    BILITOT 0.5 07/30/2019    AST 46 (H) 07/30/2019    ALKPHOS 66 07/30/2019    ALT 25 07/30/2019       No results found for: COLORU, APPEARANCEUA, SPECGRAV, PHUR, PROTEINUA, GLUCOSEU, KETONESU, BLOODU, LEUKOCYTESUR, NITRITE, UROBILINOGEN    Lab Results   Component Value Date    CRP 5.0 07/30/2019       Lab Results   Component Value Date    SEDRATE 104 (H) 07/30/2019       Lab Results   Component Value Date    RF <10.0 02/06/2019    SEDRATE 104 (H) 07/30/2019       No components found for: 25OHVITDTOT,  38TZGNJG2, 93NXPPCQ5, METHODNOTE    No results found for: URICACID    No components found for: TSPOTTB    · CK: 981 (3/2019)-> 969 (4/2019)-> 330 (5/2019)-> 5558-> 862  · Aldolase: 12.8 (3/2019)-> pending  · LDH: 333    Rheum Labs:  ISMAEL 1 in 320 speckled pattern  RNP 6.2 (reference less than 1)  SSA/B negative  Anti histone 3.5 (+)  Anti Smith negative  SCL 70 antibody negative  C3/4 within normal range  Double-stranded DNA negative  ESR 63-> 57  CRP 0.2 -> 10.5  ANCA negative, MPO/WV 3 negative  Myomarker panel3:  (ref <20): Anti Abigail 1: 31. EJ weak +. MDMA5: 31. U1-RNP 50.    Infectious Labs:  Hepatitis profile non-reactive  QuantiFERON TB indeterminate 2019    Imaging:  All imaging reviewed and independently  interpreted by me.    CT chest November 2016  Patchy ground-glass opacities, improvement compared to previous imaging.    Chest x-ray July 25, 2019.   Diffuse increased interstitial markings, stable in interval.    Pulmonary function test: FVC / FEV1 / Ratio / TLC / DLCO  July 2018  76 / 76 / 78 / n/a / 35    7/25/2019:   62 /  65 / 82 / na / 22    TTE 3/2019   rsvp 41     ASSESSMENT / PLAN:     Devi Fernandes is a 68 y.o. Black or  female with:    1.  Mixed connective tissue disease  - worsening ILD reported by outside pulmonologist (discussed on last appointment).  - recommend to evaluate and manage alternate DDx, ie pHTN  - will initiate RTX today for worsening ILD in setting of MCTD with myositis  - will hold CellCept 500mg bid for now.  On prednisone 10 mg per Pulmonary.  May start tapering per clinical response.    2. Chronic DMARD use  - monitor for toxicity  - QuantiFERON indeterminate due to immune status.  Recommend for TB spot    3. Other specified counseling  - over 10 minutes spent regarding below topics:  - DXA for bone health. Will consider for prophylactic bone antiresorptive therapy (of note, patient with dysphagia).  - Immunization counseling done.  High dose flu shot  today.  - Medication counseling provided.  - age appropriate malignancy screening per PMD recommendations    RTC 3 months    Method of contact with patient concerns: [facility]    Disclaimer:  This note is prepared using voice recognition software and as such is likely to have errors and has not been proof read. Please contact me for questions.     Time spent: 25 minutes in face to face discussion concerning diagnosis, prognosis, review of lab and test results, benefits of treatment as well as management of disease, counseling of patient and coordination of care between various health care providers.  Greater than half the time spent was used for coordination of care and counseling of patient.    Zachariah Thurman M.D.  Rheumatology Department   [facility]

## 2019-09-10 ENCOUNTER — TELEPHONE (OUTPATIENT)
Dept: RHEUMATOLOGY | Facility: HOSPITAL | Age: 69
End: 2019-09-10

## 2019-09-10 NOTE — TELEPHONE ENCOUNTER
----- Message from Stefanie Maloney sent at 9/10/2019  2:39 PM CDT -----  Contact: pt   She's calling in regards to RS APPT       pls call pt back at [phone] (home)

## 2019-09-12 ENCOUNTER — DOCUMENTATION ONLY (OUTPATIENT)
Dept: RHEUMATOLOGY | Facility: CLINIC | Age: 69
End: 2019-09-12

## 2019-09-17 ENCOUNTER — INFUSION (OUTPATIENT)
Dept: RHEUMATOLOGY | Facility: HOSPITAL | Age: 69
End: 2019-09-17
Attending: INTERNAL MEDICINE
Payer: MEDICARE

## 2019-09-17 ENCOUNTER — CLINICAL SUPPORT (OUTPATIENT)
Dept: AUDIOLOGY | Facility: CLINIC | Age: 69
End: 2019-09-17
Payer: MEDICARE

## 2019-09-17 ENCOUNTER — OFFICE VISIT (OUTPATIENT)
Dept: OTOLARYNGOLOGY | Facility: CLINIC | Age: 69
End: 2019-09-17
Payer: MEDICARE

## 2019-09-17 VITALS
HEART RATE: 76 BPM | HEART RATE: 98 BPM | SYSTOLIC BLOOD PRESSURE: 149 MMHG | WEIGHT: 138.88 LBS | DIASTOLIC BLOOD PRESSURE: 66 MMHG | DIASTOLIC BLOOD PRESSURE: 75 MMHG | RESPIRATION RATE: 18 BRPM | WEIGHT: 137.38 LBS | SYSTOLIC BLOOD PRESSURE: 138 MMHG | OXYGEN SATURATION: 92 % | BODY MASS INDEX: 22.86 KG/M2 | TEMPERATURE: 98 F | BODY MASS INDEX: 23.11 KG/M2

## 2019-09-17 DIAGNOSIS — M35.9 POLYMYOSITIS ASSOCIATED WITH AUTOIMMUNE DISEASE: ICD-10-CM

## 2019-09-17 DIAGNOSIS — M33.20 POLYMYOSITIS ASSOCIATED WITH AUTOIMMUNE DISEASE: ICD-10-CM

## 2019-09-17 DIAGNOSIS — M35.1 MIXED CONNECTIVE TISSUE DISEASE: ICD-10-CM

## 2019-09-17 DIAGNOSIS — H69.93 CHRONIC DYSFUNCTION OF BOTH EUSTACHIAN TUBES: Primary | ICD-10-CM

## 2019-09-17 DIAGNOSIS — R74.8 ELEVATED CK: ICD-10-CM

## 2019-09-17 DIAGNOSIS — K13.79 VELOPHARYNGEAL INSUFFICIENCY, ACQUIRED: Primary | ICD-10-CM

## 2019-09-17 DIAGNOSIS — H69.93 DYSFUNCTION OF BOTH EUSTACHIAN TUBES: ICD-10-CM

## 2019-09-17 DIAGNOSIS — J84.9 ILD (INTERSTITIAL LUNG DISEASE): Primary | ICD-10-CM

## 2019-09-17 DIAGNOSIS — H90.3 SENSORINEURAL HEARING LOSS, BILATERAL: ICD-10-CM

## 2019-09-17 PROCEDURE — 92557 COMPREHENSIVE HEARING TEST: CPT | Mod: PBBFAC | Performed by: AUDIOLOGIST-HEARING AID FITTER

## 2019-09-17 PROCEDURE — 96415 CHEMO IV INFUSION ADDL HR: CPT

## 2019-09-17 PROCEDURE — 25000003 PHARM REV CODE 250: Performed by: INTERNAL MEDICINE

## 2019-09-17 PROCEDURE — 63600175 PHARM REV CODE 636 W HCPCS: Performed by: INTERNAL MEDICINE

## 2019-09-17 PROCEDURE — 96366 THER/PROPH/DIAG IV INF ADDON: CPT

## 2019-09-17 PROCEDURE — 96372 THER/PROPH/DIAG INJ SC/IM: CPT

## 2019-09-17 PROCEDURE — 99213 OFFICE O/P EST LOW 20 MIN: CPT | Mod: PBBFAC,25 | Performed by: ORTHOPAEDIC SURGERY

## 2019-09-17 PROCEDURE — 99214 OFFICE O/P EST MOD 30 MIN: CPT | Mod: S$PBB,,, | Performed by: ORTHOPAEDIC SURGERY

## 2019-09-17 PROCEDURE — 99999 PR PBB SHADOW E&M-EST. PATIENT-LVL III: CPT | Mod: PBBFAC,,, | Performed by: ORTHOPAEDIC SURGERY

## 2019-09-17 PROCEDURE — 96365 THER/PROPH/DIAG IV INF INIT: CPT

## 2019-09-17 PROCEDURE — S0028 INJECTION, FAMOTIDINE, 20 MG: HCPCS | Performed by: INTERNAL MEDICINE

## 2019-09-17 PROCEDURE — 99999 PR PBB SHADOW E&M-EST. PATIENT-LVL III: ICD-10-PCS | Mod: PBBFAC,,, | Performed by: ORTHOPAEDIC SURGERY

## 2019-09-17 PROCEDURE — 99214 PR OFFICE/OUTPT VISIT, EST, LEVL IV, 30-39 MIN: ICD-10-PCS | Mod: S$PBB,,, | Performed by: ORTHOPAEDIC SURGERY

## 2019-09-17 PROCEDURE — 96367 TX/PROPH/DG ADDL SEQ IV INF: CPT

## 2019-09-17 PROCEDURE — 96413 CHEMO IV INFUSION 1 HR: CPT

## 2019-09-17 PROCEDURE — A4216 STERILE WATER/SALINE, 10 ML: HCPCS | Performed by: INTERNAL MEDICINE

## 2019-09-17 PROCEDURE — 92567 TYMPANOMETRY: CPT | Mod: PBBFAC | Performed by: AUDIOLOGIST-HEARING AID FITTER

## 2019-09-17 PROCEDURE — 96375 TX/PRO/DX INJ NEW DRUG ADDON: CPT

## 2019-09-17 RX ORDER — SODIUM CHLORIDE 0.9 % (FLUSH) 0.9 %
10 SYRINGE (ML) INJECTION
Status: DISCONTINUED | OUTPATIENT
Start: 2019-09-17 | End: 2019-09-17 | Stop reason: HOSPADM

## 2019-09-17 RX ORDER — MEPERIDINE HYDROCHLORIDE 50 MG/ML
25 INJECTION INTRAMUSCULAR; INTRAVENOUS; SUBCUTANEOUS
Status: CANCELLED | OUTPATIENT
Start: 2019-10-01

## 2019-09-17 RX ORDER — HEPARIN 100 UNIT/ML
500 SYRINGE INTRAVENOUS
Status: CANCELLED | OUTPATIENT
Start: 2019-10-01

## 2019-09-17 RX ORDER — FAMOTIDINE 10 MG/ML
20 INJECTION INTRAVENOUS
Status: DISCONTINUED | OUTPATIENT
Start: 2019-09-17 | End: 2022-06-22

## 2019-09-17 RX ORDER — FAMOTIDINE 20 MG/50ML
20 INJECTION, SOLUTION INTRAVENOUS
Status: CANCELLED
Start: 2019-09-17

## 2019-09-17 RX ORDER — DIPHENHYDRAMINE HCL 25 MG
25 CAPSULE ORAL
Status: CANCELLED | OUTPATIENT
Start: 2019-10-01

## 2019-09-17 RX ORDER — DIPHENHYDRAMINE HYDROCHLORIDE 50 MG/ML
25 INJECTION INTRAMUSCULAR; INTRAVENOUS
Status: COMPLETED | OUTPATIENT
Start: 2019-09-17 | End: 2019-09-17

## 2019-09-17 RX ORDER — ACETAMINOPHEN 325 MG/1
650 TABLET ORAL
Status: COMPLETED | OUTPATIENT
Start: 2019-09-17 | End: 2019-09-17

## 2019-09-17 RX ORDER — METHYLPREDNISOLONE SOD SUCC 125 MG
100 VIAL (EA) INJECTION
Status: COMPLETED | OUTPATIENT
Start: 2019-09-17 | End: 2019-09-17

## 2019-09-17 RX ORDER — DIPHENHYDRAMINE HCL 25 MG
25 CAPSULE ORAL EVERY 6 HOURS PRN
Status: CANCELLED
Start: 2019-10-01

## 2019-09-17 RX ORDER — MEPERIDINE HYDROCHLORIDE 50 MG/ML
25 INJECTION INTRAMUSCULAR; INTRAVENOUS; SUBCUTANEOUS
Status: DISCONTINUED | OUTPATIENT
Start: 2019-09-17 | End: 2019-09-17

## 2019-09-17 RX ORDER — FAMOTIDINE 20 MG/50ML
20 INJECTION, SOLUTION INTRAVENOUS
Status: COMPLETED | OUTPATIENT
Start: 2019-09-17 | End: 2019-09-17

## 2019-09-17 RX ORDER — ACETAMINOPHEN 325 MG/1
650 TABLET ORAL
Status: CANCELLED | OUTPATIENT
Start: 2019-10-01

## 2019-09-17 RX ORDER — SODIUM CHLORIDE 0.9 % (FLUSH) 0.9 %
10 SYRINGE (ML) INJECTION
Status: CANCELLED | OUTPATIENT
Start: 2019-10-01

## 2019-09-17 RX ORDER — DIPHENHYDRAMINE HYDROCHLORIDE 50 MG/ML
25 INJECTION INTRAMUSCULAR; INTRAVENOUS
Status: CANCELLED
Start: 2019-09-17

## 2019-09-17 RX ORDER — DIPHENHYDRAMINE HCL 25 MG
25 CAPSULE ORAL
Status: DISCONTINUED | OUTPATIENT
Start: 2019-09-17 | End: 2019-09-17

## 2019-09-17 RX ORDER — METHYLPREDNISOLONE SOD SUCC 125 MG
100 VIAL (EA) INJECTION
Status: CANCELLED
Start: 2019-09-17

## 2019-09-17 RX ADMIN — METHYLPREDNISOLONE SODIUM SUCCINATE 100 MG: 125 INJECTION, POWDER, FOR SOLUTION INTRAMUSCULAR; INTRAVENOUS at 09:09

## 2019-09-17 RX ADMIN — DIPHENHYDRAMINE HYDROCHLORIDE 25 MG: 50 INJECTION INTRAMUSCULAR; INTRAVENOUS at 09:09

## 2019-09-17 RX ADMIN — Medication 10 ML: at 08:09

## 2019-09-17 RX ADMIN — ACETAMINOPHEN 650 MG: 325 TABLET ORAL at 08:09

## 2019-09-17 RX ADMIN — FAMOTIDINE 20 MG: 20 INJECTION, SOLUTION INTRAVENOUS at 09:09

## 2019-09-17 RX ADMIN — RITUXIMAB 1000 MG: 10 INJECTION, SOLUTION INTRAVENOUS at 09:09

## 2019-09-17 NOTE — PROGRESS NOTES
Referring Provider:Dr. Con Fernandes was seen 09/17/2019 for an audiological evaluation.  She has noted increasing pressure in her ears, and says that she has tinnitus in both ears.  Sometimes her ears are stopped up, and at that time she has difficulty hearing.  She says that her ears are not as stopped up today as they have been.  She has known VPI with nearly no movement of her palate.     Results reveal a normal-to-mild sensorineural hearing loss 250-8000 Hz for the right ear, and a normal-to-mild sensorineural hearing loss 250-8000 Hz for the left ear.   Speech Reception Thresholds were  25 dBHL for the right ear and 20 dBHL for the left ear.   Tympanograms were Type C for the right ear and Type C for the left ear.    Patient was counseled on the above findings.    Recommendations:  1. ENT  2. Annual Audiograms

## 2019-09-17 NOTE — NURSING
Infusion # 2 of 2-Cycle 1 - Rituxan 1,000 mg q 6 months  Last dose-9/3/19     Any:  -recent illness, infection, or antibiotic use in past week- denies  -open wounds or mouth sores- denies  -invasive procedures or surgeries in past 4 weeks or in upcoming 4 weeks- denies  -vaccinations in past week- denies  -chance you may be pregnant- n/a     Recent labs? 9/3/19  Last provider visit- Seen by Dr. Thurman on 9/3/19     Premeds? 650 mg Tylenol PO, 100 mg Solumedrol IVP over 3 minutes, 25 mg Benadryl IVP over 3 minutes, 20 mg Pepcid IVPB over 20 minutes.       HD flu administered per Dr. Thurman' orders. See immunizations for details.     Rituxan 1,000 mg administered IV over 3.5 hours starting at 100 ml/hr and increasing rate by 100 ml/hr every 30 minutes to a max rate of 400 ml/hr per protocol; see MAR and vitals for more details. Tolerated well without adverse events, discharged and ambulatory out of clinic.

## 2019-09-17 NOTE — PROGRESS NOTES
Subjective:       Patient ID: Devi Fernandes is a 68 y.o. female.    Chief Complaint: Ear Fullness (intermittent ); Other (sounds like wind blowing in her ears); Cough (x 1 month); and Sinus Problem (post nasal drip)    Patient is a very pleasant 68 year old female here to see me today for evaluation of her ears.  She has noted increasing pressure in her ears, and says that she has tinnitus in both ears.  Sometimes her ears are stopped up, and at that time she has difficulty hearing.  She says that her ears are not as stopped up today as they have been.  She has known VPI with nearly no movement of her palate.  She is having stable issues with phonation and dysphagia, though she has noted less nasal regurgitation of liquids and solids.  She has noted increased clear sprays.  She is following with Rheumatology as well as Pulmonary, has diagnosis of ILD and MCTD, polymyositis.  She has not been in speech therapy, did have some therapy for dysphagia in [place].    Review of Systems   Constitutional: Negative for chills, fatigue, fever and unexpected weight change.   HENT: Positive for congestion, trouble swallowing and voice change. Negative for dental problem, ear discharge, ear pain, facial swelling, hearing loss, nosebleeds, postnasal drip, rhinorrhea, sinus pressure, sneezing, sore throat and tinnitus.    Eyes: Negative for redness, itching and visual disturbance.   Respiratory: Positive for cough and shortness of breath. Negative for choking and wheezing.    Cardiovascular: Negative for chest pain and palpitations.   Gastrointestinal: Negative for abdominal pain.        No reflux.   Musculoskeletal: Negative for gait problem.   Skin: Negative for rash.   Neurological: Positive for speech difficulty. Negative for dizziness, light-headedness and headaches.       Objective:      Physical Exam   Constitutional: She is oriented to person, place, and time. She appears well-developed and well-nourished. No  distress.   HENT:   Head: Normocephalic and atraumatic.   Right Ear: External ear normal. Tympanic membrane is retracted. Tympanic membrane is not perforated. No middle ear effusion.   Left Ear: External ear and ear canal normal. Tympanic membrane is retracted. Tympanic membrane is not perforated.  No middle ear effusion.   Nose: Nose normal. No mucosal edema, rhinorrhea, nasal deformity or septal deviation. No epistaxis. Right sinus exhibits no maxillary sinus tenderness and no frontal sinus tenderness. Left sinus exhibits no maxillary sinus tenderness and no frontal sinus tenderness.   Mouth/Throat: Uvula is midline, oropharynx is clear and moist and mucous membranes are normal. Mucous membranes are not pale and not dry. No dental caries. No oropharyngeal exudate or posterior oropharyngeal erythema.   Extreme hypernasality, loss of gag reflex   Eyes: Pupils are equal, round, and reactive to light. Conjunctivae, EOM and lids are normal. Right eye exhibits no chemosis. Left eye exhibits no chemosis. Right conjunctiva is not injected. Left conjunctiva is not injected. No scleral icterus. Right eye exhibits normal extraocular motion and no nystagmus. Left eye exhibits normal extraocular motion and no nystagmus.   Neck: Trachea normal and phonation normal. No tracheal tenderness present. No tracheal deviation present. No thyroid mass and no thyromegaly present.   Cardiovascular: Intact distal pulses.   Pulmonary/Chest: Effort normal. No stridor. No respiratory distress.   Abdominal: She exhibits no distension.   Lymphadenopathy:        Head (right side): No submental, no submandibular, no preauricular, no posterior auricular and no occipital adenopathy present.        Head (left side): No submental, no submandibular, no preauricular, no posterior auricular and no occipital adenopathy present.     She has no cervical adenopathy.   Neurological: She is alert and oriented to person, place, and time. No cranial nerve  deficit.   Skin: Skin is warm and dry. No rash noted. No erythema.   Psychiatric: She has a normal mood and affect. Her behavior is normal.       AUDIOGRAM:  Results reveal a normal-to-mild sensorineural hearing loss 250-8000 Hz for the right ear, and a normal-to-mild sensorineural hearing loss 250-8000 Hz for the left ear.   Speech Reception Thresholds were  25 dBHL for the right ear and 20 dBHL for the left ear.   Tympanograms were Type C for the right ear and Type C for the left ear.          Assessment:       1. Velopharyngeal insufficiency, acquired    2. Mixed connective tissue disease    3. Polymyositis associated with autoimmune disease    4. Dysfunction of both eustachian tubes        Plan:       1.  VPI:  She is under care of Rheumatology and is current on therapy.  I would recommend ST, will contact our VPI ST here and see if there is anyone she would recommend in [place] for therapy.     2.  Bilateral ETD:  We had a long discussion regarding the anatomy and function of the eustachian tube.  We discussed that the eustachian tube acts as a pump to keep the appropriate amount of pressure behind the ear drum.  I gave the patient a prescription for a nasal steroid spray to be used on a daily basis, and we discussed that it will take 2-3 weeks of daily use to achieve maximal effectiveness.  No indication for PET at this time.

## 2019-09-19 ENCOUNTER — TELEPHONE (OUTPATIENT)
Dept: OTOLARYNGOLOGY | Facility: CLINIC | Age: 69
End: 2019-09-19

## 2019-09-19 DIAGNOSIS — R49.21 HYPERNASALITY SYNDROME DUE TO VELOPHARYNGEAL WEAKNESS: Primary | ICD-10-CM

## 2019-09-19 DIAGNOSIS — R13.10 DYSPHAGIA, UNSPECIFIED TYPE: ICD-10-CM

## 2019-09-19 NOTE — TELEPHONE ENCOUNTER
----- Message from Nell Grimaldo CCC-SLP sent at 9/18/2019  8:59 AM CDT -----  Hey I do! Rachael Avery at [facility] is really good.  ----- Message -----  From: Shalini Andujar MD  Sent: 9/18/2019   8:38 AM  To: OLGA Moran    Do you know anyone in  for VPI in [place]?

## 2019-09-19 NOTE — TELEPHONE ENCOUNTER
Please let the patient know that our therapist did have someone to recommend for her speech/swallowing therapy in [place].  Please ask if she is interested, and if she is please send referral to the provider Nell listed below.  Order in computer if needed.

## 2019-09-20 ENCOUNTER — TELEPHONE (OUTPATIENT)
Dept: OTOLARYNGOLOGY | Facility: CLINIC | Age: 69
End: 2019-09-20

## 2019-09-20 NOTE — TELEPHONE ENCOUNTER
----- Message from Estella Villasenor MA sent at 9/19/2019  4:11 PM CDT -----  Try calling the patient again.

## 2019-09-20 NOTE — TELEPHONE ENCOUNTER
Patient still interested in PT in [place]. All paperwork has been faxed over to Rachael Avery at [phone].

## 2019-10-01 ENCOUNTER — TELEPHONE (OUTPATIENT)
Dept: OTOLARYNGOLOGY | Facility: CLINIC | Age: 69
End: 2019-10-01

## 2019-10-01 NOTE — TELEPHONE ENCOUNTER
Received a fax from [facility], requesting a MBS and Possible imaging of bilateral neck enlargements. All faxed progress notes and requesting information is scanned into media for review.

## 2019-12-10 RX ORDER — PREDNISONE 5 MG/1
5 TABLET ORAL DAILY
Qty: 30 TABLET | Refills: 0 | Status: SHIPPED | OUTPATIENT
Start: 2019-12-10 | End: 2020-05-05

## 2020-05-05 RX ORDER — PREDNISONE 5 MG/1
TABLET ORAL
Qty: 30 TABLET | Refills: 0 | Status: SHIPPED | OUTPATIENT
Start: 2020-05-05 | End: 2023-01-18

## 2020-08-27 ENCOUNTER — IMPORTED ENCOUNTER (OUTPATIENT)
Dept: URBAN - METROPOLITAN AREA CLINIC 1 | Facility: CLINIC | Age: 70
End: 2020-08-27

## 2020-08-27 PROBLEM — H16.143: Noted: 2020-08-27

## 2020-08-27 PROBLEM — H40.013: Noted: 2020-08-27

## 2020-08-27 PROBLEM — H04.123: Noted: 2020-08-27

## 2020-08-27 PROBLEM — H40.023: Noted: 2020-08-27

## 2020-08-27 PROBLEM — H25.813: Noted: 2020-08-27

## 2020-08-27 PROBLEM — H43.813: Noted: 2020-08-27

## 2020-08-27 PROCEDURE — 92004 COMPRE OPH EXAM NEW PT 1/>: CPT

## 2020-08-27 PROCEDURE — 92250 FUNDUS PHOTOGRAPHY W/I&R: CPT

## 2020-08-27 NOTE — PATIENT DISCUSSION
1.  Cataract OU: Visually significant however patient wishes to observe for now without intervention. The patient was advised to contact us if any change or worsening of vision2. BLANKA w/ PEK OU- Recommend ATs TID OU routinely 3. Glaucoma Suspect OU (CD 0.60/0.70): Family hx (Father). Photos done today showing disc cupping OU. Patient is considered high risk. Condition was discussed with patient and patient understands. Will continue to monitor patient for any progression in condition. Patient was advised to call us with any problems questions or concerns. 4.  PVD OU - RD precautions. Patient deferred Manifest Rx today. Return for an appointment in 6 months DFE/OCT/glare with Dr. Jamie Armstrong.

## 2020-12-10 NOTE — Clinical Note
Rituximab recommended for this patient to replace IVIG.  Anything you need, please let me know.  Thank you! Post-Care Instructions: I reviewed with the patient in detail post-care instructions. Patient is to wear sunprotection, and avoid picking at any of the treated lesions. Pt may apply Vaseline to crusted or scabbing areas. Render Note In Bullet Format When Appropriate: No Duration Of Freeze Thaw-Cycle (Seconds): 10 Number Of Freeze-Thaw Cycles: 2 freeze-thaw cycles Detail Level: Detailed Consent: The patient's consent was obtained including but not limited to risks of crusting, scabbing, blistering, scarring, darker or lighter pigmentary change, recurrence, incomplete removal and infection.

## 2021-05-04 ENCOUNTER — IMPORTED ENCOUNTER (OUTPATIENT)
Dept: URBAN - METROPOLITAN AREA CLINIC 1 | Facility: CLINIC | Age: 71
End: 2021-05-04

## 2021-05-04 PROBLEM — H04.123: Noted: 2021-05-04

## 2021-05-04 PROBLEM — H25.813: Noted: 2021-05-04

## 2021-05-04 PROBLEM — H40.023: Noted: 2021-05-04

## 2021-05-04 PROBLEM — H16.143: Noted: 2021-05-04

## 2021-05-04 PROCEDURE — 92133 CPTRZD OPH DX IMG PST SGM ON: CPT

## 2021-05-04 PROCEDURE — 92015 DETERMINE REFRACTIVE STATE: CPT

## 2021-05-04 PROCEDURE — 99213 OFFICE O/P EST LOW 20 MIN: CPT

## 2021-05-04 NOTE — PATIENT DISCUSSION
1.  Glaucoma Suspect OU (CD 0.60/0.70): Family hx (Father). OCT WNL OU today. Patient is considered high risk. Condition was discussed with patient and patient understands. Will continue to monitor patient for any progression in condition. Patient was advised to call us with any problems questions or concerns. 2.  Cataract OU:  Visually Significant secondary to glare discussed the risks benefits alternatives and limitations of cataract surgery. The patient stated a full understanding and a desire to proceed with the procedure. The patient will need to return for preop appointment with cataract measurements and to have any additional questions answered and start pre-operative eye drops as directed. Phaco PCL OS then ODOtherwise follow-up 6 months 10/DFE/glare/HVF 3. BLANKA w/ PEK OU- Recommend ATs TID OU routinely. 4. PVD OU - Stable. RD precautions. Return for an appointment for Ascan/H and P. with Dr. Carolina York.

## 2021-05-06 ENCOUNTER — IMPORTED ENCOUNTER (OUTPATIENT)
Dept: URBAN - METROPOLITAN AREA CLINIC 1 | Facility: CLINIC | Age: 71
End: 2021-05-06

## 2021-05-06 PROBLEM — H25.812: Noted: 2021-05-06

## 2021-05-06 PROCEDURE — 92136 OPHTHALMIC BIOMETRY: CPT

## 2021-05-06 NOTE — PATIENT DISCUSSION
1. Cataract OS:  Visually Significant secondary to glare discussed the risks benefits alternatives and limitations of cataract surgery. The patient stated a full understanding and a desire to proceed with the procedure. Discussed with patient if PO Gtts are more than $120 for all three combined when filling at their Pharmacy please call our office to request generic substitutions. Pt understands they will need glasses post-op to achieve their best corrected vision. Phaco PCL OS  Lifestyle Questionnaire Completed. Return for an appointment for Return as scheduled with Dr. Nelsy Rodney.

## 2021-05-12 ENCOUNTER — IMPORTED ENCOUNTER (OUTPATIENT)
Dept: URBAN - METROPOLITAN AREA CLINIC 1 | Facility: CLINIC | Age: 71
End: 2021-05-12

## 2021-05-13 ENCOUNTER — IMPORTED ENCOUNTER (OUTPATIENT)
Dept: URBAN - METROPOLITAN AREA CLINIC 1 | Facility: CLINIC | Age: 71
End: 2021-05-13

## 2021-05-13 PROBLEM — Z96.1: Noted: 2021-05-13

## 2021-05-13 PROCEDURE — 99024 POSTOP FOLLOW-UP VISIT: CPT

## 2021-05-13 NOTE — PATIENT DISCUSSION
POD#1 CE/IOL OS (Standard) doing well. *Dextenza in place IOP spike 1 gtt Combigan instilled prior to leaving Use Lotemax BID OS Prolensa Qdaily OS Ocuflox TID OS : Use all three gtts through completion of PO gtt chart regimen/ Per our instructions given to patient.   Post op Warnings Reiterated RTC as scheduled

## 2021-05-13 NOTE — TELEPHONE ENCOUNTER
Pt was called back and informed that she needs to have a procedure with a scope done at todays vist there fore she cant see a PA and Dr Andujar must leave at a certain time so she informed that pt can come in no later than 2pm any later and we will have to reschedule.        ----- Message from Argensi Morrell sent at 2/27/2019 12:22 PM CST -----  Type:  Same Day Appointment Request    Caller is requesting a same day appointment.  Caller declined first available appointment listed below.    Name of Caller: John Edwarddenise  When is the first available appointment?  Symptoms:  Best Call Back Number: [phone]  Additional Information: States she has an appt with Dr Andujar for 1:00pm today//she is coming from [place], [place] and [place]//it is starting to move now but will be late for the appt//possibly 30 minutes late//wanted you to know//please call//thanks/yong       TRIAGE NOTE:  Patient arrives by EMS with c/o abdominal pain. Patient just seen at [facility] 2 hours ago, patient reports \"they don't do nothing for me\". Patient reports pain has been intermittent for year. Patient states they gave me a medication in my arm but it didn't work.

## 2021-05-17 ENCOUNTER — IMPORTED ENCOUNTER (OUTPATIENT)
Dept: URBAN - METROPOLITAN AREA CLINIC 1 | Facility: CLINIC | Age: 71
End: 2021-05-17

## 2021-05-17 PROBLEM — H25.811: Noted: 2021-05-17

## 2021-05-17 PROCEDURE — 92136 OPHTHALMIC BIOMETRY: CPT

## 2021-05-17 NOTE — PATIENT DISCUSSION
1.  Cataract OD: Visually Significant secondary to glare discussed the risks benefits alternatives and limitations of cataract surgery. The patient stated a full understanding and a desire to proceed with the procedure. Discussed with patient if PO Gtts are more than $120 for all three combined when filling at their Pharmacy please call our office to request generic substitutions. Pt understands they will need glasses post-op to achieve their best corrected vision. Phaco PCL OD 2. POW#3  CE/IOL OS (Standard) doing well. *Dextenza in place  Use Prednisolone BID OS & Prolensa Qdaily OS: Use through completion of po gtt regimen.  F/u as scheduled 2nd eye

## 2021-05-21 ENCOUNTER — IMPORTED ENCOUNTER (OUTPATIENT)
Dept: URBAN - METROPOLITAN AREA CLINIC 1 | Facility: CLINIC | Age: 71
End: 2021-05-21

## 2021-05-22 ENCOUNTER — IMPORTED ENCOUNTER (OUTPATIENT)
Dept: URBAN - METROPOLITAN AREA CLINIC 1 | Facility: CLINIC | Age: 71
End: 2021-05-22

## 2021-05-22 PROBLEM — Z96.1: Noted: 2021-05-22

## 2021-05-22 PROCEDURE — 99024 POSTOP FOLLOW-UP VISIT: CPT

## 2021-05-22 NOTE — PATIENT DISCUSSION
1. POD#1 Phaco/ PCL Standard OD- doing well. *Dextenza internalized *Mild IOP Nathan at 36 1 gtt combigan instilled. Use Lotemax BID OD Prolensa Qdaily OD Ocuflox TID OD: Use all three gtts through completion of PO gtt chart regimen/ Per our instructions given. Post op Warnings Reiterated 2.  POW#3 Phaco/ PCL OS- doing well Use Lotemax BID OS Prolensa Qdaily OS RTC as scheduled  **Pt moving away and will likely get MRx in [place]

## 2021-05-28 ENCOUNTER — IMPORTED ENCOUNTER (OUTPATIENT)
Dept: URBAN - METROPOLITAN AREA CLINIC 1 | Facility: CLINIC | Age: 71
End: 2021-05-28

## 2021-05-28 PROBLEM — Z96.1: Noted: 2021-05-28

## 2021-05-28 PROCEDURE — 99024 POSTOP FOLLOW-UP VISIT: CPT

## 2021-05-28 NOTE — PATIENT DISCUSSION
POM#1 CE/IOL OU (Standard OU) doing well. Use Lotemax BID OD and Prolensa Qdaily OD: Use gtts through completion of PO gtt regimen. MRX for glasses given. Return for an appointment in PRN 30/OCT (patient moving) with Dr. Beau Gomez.

## 2022-02-24 DIAGNOSIS — Z12.11 COLON CANCER SCREENING: Primary | ICD-10-CM

## 2022-04-02 ASSESSMENT — TONOMETRY
OS_IOP_MMHG: 15
OS_IOP_MMHG: 13
OS_IOP_MMHG: 16
OS_IOP_MMHG: 15
OD_IOP_MMHG: 15
OD_IOP_MMHG: 36
OD_IOP_MMHG: 14
OD_IOP_MMHG: 14
OS_IOP_MMHG: 15
OS_IOP_MMHG: 35

## 2022-04-02 ASSESSMENT — VISUAL ACUITY
OD_SC: 20/30
OS_CC: 20/25
OS_CC: J1
OD_CC: 20/30
OS_CC: 20/25
OD_GLARE: 20/30
OS_CC: 20/25
OD_CC: 20/60
OD_CC: 20/60
OD_CC: J1
OS_SC: 20/25
OS_GLARE: 20/80
OD_PH: SC 20/30
OS_SC: 20/40
OD_GLARE: 20/80
OS_GLARE: 20/40
OS_CC: 20/30+2
OS_CC: 20/50
OD_SC: 20/30

## 2022-06-22 ENCOUNTER — OFFICE VISIT (OUTPATIENT)
Dept: GASTROENTEROLOGY | Facility: CLINIC | Age: 72
End: 2022-06-22
Payer: MEDICARE

## 2022-06-22 VITALS
SYSTOLIC BLOOD PRESSURE: 151 MMHG | HEART RATE: 88 BPM | WEIGHT: 149.81 LBS | HEIGHT: 69 IN | BODY MASS INDEX: 22.19 KG/M2 | OXYGEN SATURATION: 95 % | DIASTOLIC BLOOD PRESSURE: 68 MMHG

## 2022-06-22 DIAGNOSIS — R13.19 ESOPHAGEAL DYSPHAGIA: ICD-10-CM

## 2022-06-22 DIAGNOSIS — K21.9 GASTROESOPHAGEAL REFLUX DISEASE, UNSPECIFIED WHETHER ESOPHAGITIS PRESENT: Primary | ICD-10-CM

## 2022-06-22 DIAGNOSIS — Z12.11 COLON CANCER SCREENING: ICD-10-CM

## 2022-06-22 DIAGNOSIS — K31.84 GASTROPARESIS: ICD-10-CM

## 2022-06-22 PROCEDURE — 99203 PR OFFICE/OUTPT VISIT, NEW, LEVL III, 30-44 MIN: ICD-10-PCS | Mod: S$GLB,,, | Performed by: INTERNAL MEDICINE

## 2022-06-22 PROCEDURE — 99203 OFFICE O/P NEW LOW 30 MIN: CPT | Mod: S$GLB,,, | Performed by: INTERNAL MEDICINE

## 2022-06-22 RX ORDER — SOD SULF/POT CHLORIDE/MAG SULF 1.479 G
12 TABLET ORAL DAILY
Qty: 24 TABLET | Refills: 0 | Status: SHIPPED | OUTPATIENT
Start: 2022-06-22 | End: 2023-01-18

## 2022-06-22 RX ORDER — MYCOPHENOLATE MOFETIL 200 MG/ML
POWDER, FOR SUSPENSION ORAL
COMMUNITY
End: 2023-01-19 | Stop reason: SDUPTHER

## 2022-06-22 NOTE — PROGRESS NOTES
Clinic Note    Reason for visit:  The primary encounter diagnosis was Gastroesophageal reflux disease, unspecified whether esophagitis present. Diagnoses of Colon cancer screening, Gastroparesis, and Esophageal dysphagia were also pertinent to this visit.    PCP: Ariana Rebollar   [address] [place] [address] Dr Inocencio Carcamo / [place] [address]    HPI:  This is a 71 y.o. female who was last seen by me 1/2019. Due for screening colonoscopy. Patient reports breathing is okay once she is able to sit down and relax but she will have wheezing if walking around a lot. Patient takes pantoprazole 20, BID, which controls reflux. No h/o colon polyps per patient. Patient reports past few days has been feeling sense of heat/burning throughout upper body, nausea, and dizziness. Denies blurred vision. Patient began taking Cellcept a couple months ago, no other recent changes. Since taking Cellcept, she is having 3-4 soft BMs daily.  Denies abdominal pain or blood in stool.     EM 6/5/2018: slight increase in resting UES pressure but normal residual pressure  GES 3/2/2018: Delayed gastric emptying at 2H and 4H c/w gastroparesis  Last EGD 2/15/2018: EBx nl. Order EM     Dysphagia, pharyngoesophageal phase: already on therapy for MCTD which is potentially contributing to some decreased esophageal clearance, has small HH, and potentially skeletal muscle involvement of her MCTD, improved with pantoprazole 20mg PO BID, EM increased basal UESP and decreased lower esophageal clearance. HOB frame elevated 8 inches. Continue PPI for now and will give BID eRx and may take 1-2 times daily. Goal of no more weight loss. TLC is helping with excessive chewing and fluid intake. Tried Ensure/Boost equivalent one a day but cannot stand the taste. Offered empiric Savary dilation but unclear if will help her dysphagia/hoarseness. Wishes to hold for now; she may contact us if she would like to scheduled. May contact us with any issues.   GERD: EGD 2018 with small HH,  likely caused by MCTD for which they are trying to get her off prednisone. Started on AZA about 2014. Continue PPI, elevated HOB in case non-acid reflux is leading to hoarseness.  Mixed connective tissue disease: continue meds as is per rheumatology (main issue)  Screening for malignant neoplasms of colon: average risk, last colonoscopy 2011, next due 2021    Review of Systems   Constitutional: Positive for fatigue. Negative for chills, diaphoresis, fever and unexpected weight change.   HENT: Positive for trouble swallowing and voice change. Negative for mouth sores, nosebleeds, postnasal drip and sore throat.    Eyes: Positive for eye dryness. Negative for pain and discharge.   Respiratory: Positive for shortness of breath. Negative for apnea, cough, choking, chest tightness and wheezing.    Cardiovascular: Negative for chest pain, palpitations, leg swelling and claudication.   Gastrointestinal: Negative for abdominal distention, abdominal pain, anal bleeding, blood in stool, change in bowel habit, constipation, diarrhea, nausea, rectal pain, vomiting, reflux, fecal incontinence and change in bowel habit.   Genitourinary: Negative for bladder incontinence, difficulty urinating, dysuria, flank pain, frequency and hematuria.   Musculoskeletal: Negative for arthralgias, back pain, joint swelling and joint deformity.   Integumentary:  Negative for color change, rash and wound.   Allergic/Immunologic: Negative for environmental allergies and food allergies.   Neurological: Positive for speech difficulty and memory loss. Negative for seizures, facial asymmetry, weakness and headaches.   Hematological: Negative for adenopathy. Does not bruise/bleed easily.   Psychiatric/Behavioral: Negative for agitation, behavioral problems, confusion, hallucinations and sleep disturbance.      Past Medical History:   Diagnosis Date    Allergy     Aortic regurgitation     Arthritis     Deviated nasal septum     Diabetes mellitus      Dysphagia     GERD (gastroesophageal reflux disease)     Hypertension     PAOLA (obstructive sleep apnea)     Pulmonary fibrosis     Pulmonary hypertension     Pulmonic regurgitation     Restrictive lung disease     Sinus bradycardia     Valvular heart disease      Past Surgical History:   Procedure Laterality Date    APPENDECTOMY      CARDIAC CATHETERIZATION  2017     SECTION      COLONOSCOPY  2011    ESOPHAGEAL MANOMETRY  2018    GASTROSCOPY  02/15/2018    Vocal Cord Surgery  2017    cyst removed     Family History   Problem Relation Age of Onset    Cancer Mother     Diabetes Mellitus Father      Social History     Tobacco Use    Smoking status: Never Smoker    Smokeless tobacco: Never Used   Substance Use Topics    Alcohol use: No    Drug use: No     Review of patient's allergies indicates:   Allergen Reactions    Codeine Other (See Comments)     Becomes weak and over heated.         Medication List with Changes/Refills   New Medications    SOD SULF-POT CHLORIDE-MAG SULF (SUTAB) 1.479-0.188- 0.225 GRAM TABLET    Take 12 tablets by mouth once daily. Take according to package instructions with indicated amount of water. No breakfast day before test. May substitute with Suprep, Clenpiq, Plenvu, Moviprep or GoLytely based on Rx plan and patient preference.   Current Medications    AMLODIPINE (NORVASC) 5 MG TABLET    Take 5 mg by mouth once daily.    CARVEDILOL (COREG) 3.125 MG TABLET    Take 3.125 mg by mouth 2 (two) times daily with meals.    FOLIC ACID (FOLVITE) 800 MCG TAB    Take 800 mcg by mouth once daily.    HYPROMELLOSE (SYSTANE GEL OPHT)    Apply 1 drop to eye 3 (three) times daily. I gel drop to each eye TID    LOSARTAN (COZAAR) 50 MG TABLET    Take 50 mg by mouth once daily.    MYCOPHENOLATE MOFETIL (CELLCEPT) 200 MG/ML SUSR    Take by mouth.    PANTOPRAZOLE (PROTONIX) 20 MG TABLET    Take 20 mg by mouth 2 (two) times daily before meals.    PREDNISONE  "(DELTASONE) 5 MG TABLET    TAKE 1 TABLET(5 MG) BY MOUTH EVERY DAY         Vital Signs:  BP (!) 151/68   Pulse 88   Ht 5' 9" (1.753 m)   Wt 67.9 kg (149 lb 12.8 oz)   SpO2 95%   BMI 22.12 kg/m²         Physical Exam  Vitals reviewed.   Constitutional:       General: She is awake. She is not in acute distress.     Appearance: Normal appearance. She is well-developed. She is not ill-appearing, toxic-appearing or diaphoretic.      Comments: Hoarseness of voice at baseline   HENT:      Head: Normocephalic and atraumatic.      Nose: Nose normal.      Mouth/Throat:      Mouth: Mucous membranes are moist.      Pharynx: Oropharynx is clear. No oropharyngeal exudate or posterior oropharyngeal erythema.   Eyes:      General: Lids are normal. Gaze aligned appropriately. No scleral icterus.        Right eye: No discharge.         Left eye: No discharge.      Extraocular Movements: Extraocular movements intact.      Conjunctiva/sclera: Conjunctivae normal.   Neck:      Trachea: Trachea normal.   Cardiovascular:      Rate and Rhythm: Normal rate and regular rhythm.      Pulses:           Radial pulses are 2+ on the right side and 2+ on the left side.   Pulmonary:      Effort: Pulmonary effort is normal. No respiratory distress.      Breath sounds: Normal breath sounds. No stridor. No wheezing or rhonchi.   Chest:      Chest wall: No tenderness.   Abdominal:      General: Bowel sounds are normal. There is no distension.      Palpations: Abdomen is soft. There is no fluid wave, hepatomegaly or mass.      Tenderness: There is no abdominal tenderness. There is no guarding or rebound.   Musculoskeletal:         General: No tenderness or deformity.      Cervical back: Full passive range of motion without pain and neck supple. No tenderness.      Right lower leg: No edema.      Left lower leg: No edema.   Lymphadenopathy:      Cervical: No cervical adenopathy.   Skin:     General: Skin is warm and dry.      Capillary Refill: " Capillary refill takes less than 2 seconds.      Coloration: Skin is not cyanotic, jaundiced or pale.      Findings: No rash.   Neurological:      General: No focal deficit present.      Mental Status: She is alert and oriented to person, place, and time.      Cranial Nerves: No facial asymmetry.      Motor: No tremor.   Psychiatric:         Attention and Perception: Attention normal.         Mood and Affect: Mood and affect normal.         Speech: Speech normal.         Behavior: Behavior normal. Behavior is cooperative.            All of the data above and below has been reviewed by myself and any further interpretations will be reflected in the assessment and plan.   The data includes review of external notes, and independent interpretation of lab results, procedures, x-rays, and imaging reports.      Assessment:  Gastroesophageal reflux disease, unspecified whether esophagitis present    Colon cancer screening  -     Ambulatory Referral to External Surgery    Gastroparesis    Esophageal dysphagia    Other orders  -     sod sulf-pot chloride-mag sulf (SUTAB) 1.479-0.188- 0.225 gram tablet; Take 12 tablets by mouth once daily. Take according to package instructions with indicated amount of water. No breakfast day before test. May substitute with Suprep, Clenpiq, Plenvu, Moviprep or GoLytely based on Rx plan and patient preference.  Dispense: 24 tablet; Refill: 0    Reflux controlled with pantoprazole 20 mg BID. Trial of once daily.  Nausea and upper abd Sx may be CellCept related.      Recommendations:  Schedule colonoscopy.  May begin taking pantoprazole 20 mg once daily. If symptoms not controlled, then may go back to 20 mg twice daily.   Consider holding CellCept for one week to see if upper abdominal symptoms resolve but first confirm okay with your rheumatologist.    Risks, benefits, and alternatives of medical management, any associated procedures, and/or treatment discussed with the patient. Patient given  opportunity to ask questions and voices understanding. Patient has elected to proceed with the recommended care modalities as discussed.    Follow up in about 1 year (around 6/22/2023).    Order summary:  Orders Placed This Encounter   Procedures    Ambulatory Referral to External Surgery          Mindy Aguilar MD    This document may have been created using a voice recognition transcribing system. Incorrect words or phrases may have been missed during proofreading. Please interpret accordingly or contact me for clarification.

## 2022-06-22 NOTE — PATIENT INSTRUCTIONS
Schedule colonoscopy.  May begin taking pantoprazole 20 mg once daily. If symptoms not controlled, then may go back to 20 mg twice daily.   Consider holding CellCept for one week to see if upper abdominal symptoms resolve but first confirm okay with your rheumatologist.

## 2022-06-24 RX ORDER — SODIUM, POTASSIUM,MAG SULFATES 17.5-3.13G
1 SOLUTION, RECONSTITUTED, ORAL ORAL ONCE
Qty: 1 KIT | Refills: 0 | Status: SHIPPED | OUTPATIENT
Start: 2022-06-24 | End: 2022-06-24

## 2022-07-22 RX ORDER — POLYETHYLENE GLYCOL 3350, SODIUM SULFATE ANHYDROUS, SODIUM BICARBONATE, SODIUM CHLORIDE, POTASSIUM CHLORIDE 236; 22.74; 6.74; 5.86; 2.97 G/4L; G/4L; G/4L; G/4L; G/4L
4 POWDER, FOR SOLUTION ORAL ONCE
Qty: 4000 ML | Refills: 0 | Status: SHIPPED | OUTPATIENT
Start: 2022-07-22 | End: 2022-07-22

## 2022-08-15 ENCOUNTER — TELEPHONE (OUTPATIENT)
Dept: GASTROENTEROLOGY | Facility: CLINIC | Age: 72
End: 2022-08-15
Payer: MEDICARE

## 2022-08-15 NOTE — TELEPHONE ENCOUNTER
Reached out to patient sister ( [place]) and she states that sister is sick and will be getting seen byt the  For her illness and wanted to cancell and reschule her sister. Will also update sister with the information of the R/S. KURTZ

## 2022-08-15 NOTE — TELEPHONE ENCOUNTER
The procedure order is only for colon cancer screening. Okay to schedule next available. That will also give her time to fully recover.  AILEEN

## 2022-08-15 NOTE — TELEPHONE ENCOUNTER
Spoke with aptient sister and updated her that she is okay to scheduled on the next sen, she is scheduled 10/11/2022.   RONALDO

## 2022-10-06 ENCOUNTER — TELEPHONE (OUTPATIENT)
Dept: GASTROENTEROLOGY | Facility: CLINIC | Age: 72
End: 2022-10-06
Payer: MEDICARE

## 2022-10-06 VITALS — BODY MASS INDEX: 22.07 KG/M2 | WEIGHT: 149 LBS | HEIGHT: 69 IN

## 2022-10-06 DIAGNOSIS — Z12.11 COLON CANCER SCREENING: Primary | ICD-10-CM

## 2022-10-06 NOTE — TELEPHONE ENCOUNTER
"[facility]  [address] [place] [address]  Phone: [phone]  Fax: [phone]    History & Physical         Provider: Dr. Mindy Aguilar    Patient Name: Devi KEATING (age):1950  71 y.o.           Gender: female   Phone: [phone]     Referring Physician: Ariana Rebollar     Vital Signs:   Height - 5' 9"  Weight - 149 lbs  BMI -  22    Plan: Colonoscopy    Z12.11 - Colon Cancer Screening       History:      Past Medical History:   Diagnosis Date    Allergy     Aortic regurgitation     Arthritis     Deviated nasal septum     Diabetes mellitus     Dysphagia     GERD (gastroesophageal reflux disease)     Hypertension     PAOLA (obstructive sleep apnea)     Pulmonary fibrosis     Pulmonary hypertension     Pulmonic regurgitation     Restrictive lung disease     Sinus bradycardia     Valvular heart disease       Past Surgical History:   Procedure Laterality Date    APPENDECTOMY      CARDIAC CATHETERIZATION  2017     SECTION      COLONOSCOPY  2011    ESOPHAGEAL MANOMETRY  2018    GASTROSCOPY  02/15/2018    Vocal Cord Surgery  2017    cyst removed      Medication List with Changes/Refills   Current Medications    AMLODIPINE (NORVASC) 5 MG TABLET    Take 5 mg by mouth once daily.    CARVEDILOL (COREG) 3.125 MG TABLET    Take 3.125 mg by mouth 2 (two) times daily with meals.    FOLIC ACID (FOLVITE) 800 MCG TAB    Take 800 mcg by mouth once daily.    HYPROMELLOSE (SYSTANE GEL OPHT)    Apply 1 drop to eye 3 (three) times daily. I gel drop to each eye TID    LOSARTAN (COZAAR) 50 MG TABLET    Take 50 mg by mouth once daily.    MYCOPHENOLATE MOFETIL (CELLCEPT) 200 MG/ML SUSR    Take by mouth.    PANTOPRAZOLE (PROTONIX) 20 MG TABLET    Take 20 mg by mouth 2 (two) times daily before meals.    PREDNISONE (DELTASONE) 5 MG TABLET    TAKE 1 TABLET(5 MG) BY MOUTH EVERY DAY "    SOD SULF-POT CHLORIDE-MAG SULF (SUTAB) 1.479-0.188- 0.225 GRAM TABLET    Take 12 tablets by mouth once daily. Take according to package instructions with indicated amount of water. No breakfast day before test. May substitute with Suprep, Clenpiq, Plenvu, Moviprep or GoLytely based on Rx plan and patient preference.      Review of patient's allergies indicates:   Allergen Reactions    Codeine Other (See Comments)     Becomes weak and over heated.       Family History   Problem Relation Age of Onset    Cancer Mother     Diabetes Mellitus Father       Social History     Tobacco Use    Smoking status: Never    Smokeless tobacco: Never   Substance Use Topics    Alcohol use: No    Drug use: No        Physical Examination:     General Appearance:___________________________  HEENT: _____________________________________  Abdomen:____________________________________  Heart:________________________________________  Lungs:_______________________________________  Extremities:___________________________________  Skin:_________________________________________  Endocrine:____________________________________  Genitourinary:_________________________________  Neurological:__________________________________      Patient has been evaluated immediately prior to sedation and is medically cleared for endoscopy with IVCS as an ASA class: ______      Physician Signature: _________________________       Date: ________  Time: ________

## 2022-10-10 NOTE — TELEPHONE ENCOUNTER
[facility]  [address] [place] [address]  Phone: [phone]  Fax: [phone]    History & Physical         Provider: Dr. Mindy Aguilar    Patient Name: Devi KEATING (age):1950  71 y.o.           Gender: female   Phone: [phone]     Referring Physician: Ariana Rebollar     Vital Signs:   Height - 5'9  Weight - 149 lb  BMI -  22.00    Plan: Colonoscopy @ [facility] IV -02 NC     Encounter Diagnosis   Name Primary?    Colon cancer screening Yes           History:      Past Medical History:   Diagnosis Date    Allergy     Aortic regurgitation     Arthritis     BMI 22.0-22.9, adult     Deviated nasal septum     Diabetes mellitus     Dysphagia     GERD (gastroesophageal reflux disease)     Hypertension     PAOLA (obstructive sleep apnea)     Pulmonary fibrosis     Pulmonary hypertension     Pulmonic regurgitation     Restrictive lung disease     Sinus bradycardia     Valvular heart disease       Past Surgical History:   Procedure Laterality Date    APPENDECTOMY      CARDIAC CATHETERIZATION  2017     SECTION      COLONOSCOPY  2011    ESOPHAGEAL MANOMETRY  2018    GASTROSCOPY  02/15/2018    Vocal Cord Surgery  2017    cyst removed      Medication List with Changes/Refills   Current Medications    AMLODIPINE (NORVASC) 5 MG TABLET    Take 5 mg by mouth once daily.    CARVEDILOL (COREG) 3.125 MG TABLET    Take 3.125 mg by mouth 2 (two) times daily with meals.    FOLIC ACID (FOLVITE) 800 MCG TAB    Take 800 mcg by mouth once daily.    HYPROMELLOSE (SYSTANE GEL OPHT)    Apply 1 drop to eye 3 (three) times daily. I gel drop to each eye TID    LOSARTAN (COZAAR) 50 MG TABLET    Take 50 mg by mouth once daily.    MYCOPHENOLATE MOFETIL (CELLCEPT) 200 MG/ML SUSR    Take by mouth.    PANTOPRAZOLE (PROTONIX) 20 MG TABLET    Take 20 mg by mouth 2 (two) times daily  before meals.    PREDNISONE (DELTASONE) 5 MG TABLET    TAKE 1 TABLET(5 MG) BY MOUTH EVERY DAY    SOD SULF-POT CHLORIDE-MAG SULF (SUTAB) 1.479-0.188- 0.225 GRAM TABLET    Take 12 tablets by mouth once daily. Take according to package instructions with indicated amount of water. No breakfast day before test. May substitute with Suprep, Clenpiq, Plenvu, Moviprep or GoLytely based on Rx plan and patient preference.      Review of patient's allergies indicates:   Allergen Reactions    Codeine Other (See Comments)     Becomes weak and over heated.       Family History   Problem Relation Age of Onset    Cancer Mother     Diabetes Mellitus Father       Social History     Tobacco Use    Smoking status: Never    Smokeless tobacco: Never   Substance Use Topics    Alcohol use: No    Drug use: No        Physical Examination:     General Appearance:___________________________  HEENT: _____________________________________  Abdomen:____________________________________  Heart:________________________________________  Lungs:_______________________________________  Extremities:___________________________________  Skin:_________________________________________  Endocrine:____________________________________  Genitourinary:_________________________________  Neurological:__________________________________      Patient has been evaluated immediately prior to sedation and is medically cleared for endoscopy with IVCS as an ASA class: ______      Physician Signature: _________________________       Date: ________  Time: ________

## 2022-10-11 ENCOUNTER — OUTSIDE PLACE OF SERVICE (OUTPATIENT)
Dept: GASTROENTEROLOGY | Facility: CLINIC | Age: 72
End: 2022-10-11
Payer: MEDICARE

## 2022-10-11 PROCEDURE — G0121 COLON CA SCRN NOT HI RSK IND: ICD-10-PCS | Mod: ,,, | Performed by: INTERNAL MEDICINE

## 2022-10-11 PROCEDURE — G0121 COLON CA SCRN NOT HI RSK IND: HCPCS | Mod: ,,, | Performed by: INTERNAL MEDICINE

## 2022-11-04 NOTE — TELEPHONE ENCOUNTER
Received call from Nicolas w/[facility]  She said her DON asked her to call me to try & help w/questions re: infusion  She explained the only info they have states Dr. Manzanares's office manages the infusions  She also stated they've had issues w/this patient and confusion  She had also called Dr. Manzanares's office and asked for visit note and info r/t infusion to be faxed to our office  [facility] does pt's infusions and she provided [facility] # - [phone]    Called [facility] to request information re: what is needed  I was told I'll have to e-mail [email or web address]  They would not assist in any other way at this time

## 2022-11-04 NOTE — TELEPHONE ENCOUNTER
"Received message that pt called  She said she's due for an infusion this Sunday and it won't be shipped until they have a new order  Pt previously seen by Dr. Manzanares in [place], no documention available at this time    Called pt to f/u  [phone]  Called [facility] to try and obtain more information  Person on phone said she didn't see anything it pt's chart about infusions but she'll have "" check and call me back    Called Dr. Manzanares's previous office at [facility] and spoke to Jennifer  Explained situation and asked for last visit note and anything r/t infusion to be faxed to us  "

## 2022-11-06 ENCOUNTER — TELEPHONE (OUTPATIENT)
Dept: GASTROENTEROLOGY | Facility: CLINIC | Age: 72
End: 2022-11-06
Payer: MEDICARE

## 2022-11-06 NOTE — TELEPHONE ENCOUNTER
This is a late entry for October 11, 2022.  I was contacted by the postoperative nurse that the patient's left eye was erythematous.  No discharge.  No vision affected.  It was thought to be related to potentially her eye being open or irritated during the procedure.  I called the patient later that late afternoon/evening and she reported her eye symptoms had resolved.  AILEEN

## 2022-11-08 NOTE — TELEPHONE ENCOUNTER
[facility]  Previous IVIg orders and nursing notes included  Scanned all notes in   New orders for IVIg written by Dr. Manzanares, faxed to [facility] and scanned to chart w/confirmation.

## 2022-12-19 ENCOUNTER — TELEPHONE (OUTPATIENT)
Dept: RHEUMATOLOGY | Facility: CLINIC | Age: 72
End: 2022-12-19
Payer: MEDICARE

## 2022-12-19 NOTE — TELEPHONE ENCOUNTER
"Received VM from [facility]  They stated pt asked to hold her IVIg infusions until after her 1/18/2023 appt w/Dr. Manzanares    Today I called back to get more detail re: how long will she be w/out infusion  The person I was transferred to asked me "call back in a few minutes, I can't do this right now"  Will call back later to f/u  "

## 2022-12-20 NOTE — TELEPHONE ENCOUNTER
"Called [facility] again to f/u on message recv'd re: pt asking for IVIg to be held until afer her appt 1/18/2023  I was told pt's last IVIg was 11/17/2022  They don't have any info re: why pt wants to hold infusion    Called pt to f/u   She said she wants to hold IVIg infusions because she'll be out of town and she doesn't want to do anymore IVIg until she talks to Dr Manzanares  Pt then also said, "that is IF I continue"  Tried to talk w/pt about concern r/t holding IVIg for so long  Pt cut me off and said she's not doing infusions until after 1/18/2023 appt  Asked pt to call back if anything changes and she will do the infusions  "

## 2023-01-18 ENCOUNTER — OFFICE VISIT (OUTPATIENT)
Dept: RHEUMATOLOGY | Facility: CLINIC | Age: 73
End: 2023-01-18
Payer: MEDICARE

## 2023-01-18 ENCOUNTER — LAB VISIT (OUTPATIENT)
Dept: LAB | Facility: HOSPITAL | Age: 73
End: 2023-01-18
Attending: INTERNAL MEDICINE
Payer: MEDICARE

## 2023-01-18 ENCOUNTER — TELEPHONE (OUTPATIENT)
Dept: RHEUMATOLOGY | Facility: CLINIC | Age: 73
End: 2023-01-18
Payer: MEDICARE

## 2023-01-18 VITALS
WEIGHT: 155.63 LBS | RESPIRATION RATE: 16 BRPM | HEART RATE: 95 BPM | BODY MASS INDEX: 23.05 KG/M2 | TEMPERATURE: 99 F | HEIGHT: 69 IN | OXYGEN SATURATION: 99 % | DIASTOLIC BLOOD PRESSURE: 74 MMHG | SYSTOLIC BLOOD PRESSURE: 139 MMHG

## 2023-01-18 DIAGNOSIS — M35.9 POLYMYOSITIS ASSOCIATED WITH AUTOIMMUNE DISEASE: ICD-10-CM

## 2023-01-18 DIAGNOSIS — D89.89 ANTISYNTHETASE SYNDROME: Primary | ICD-10-CM

## 2023-01-18 DIAGNOSIS — Z79.52 ON PREDNISONE THERAPY: ICD-10-CM

## 2023-01-18 DIAGNOSIS — J84.9 ILD (INTERSTITIAL LUNG DISEASE): ICD-10-CM

## 2023-01-18 DIAGNOSIS — R49.0 DYSPHONIA: ICD-10-CM

## 2023-01-18 DIAGNOSIS — Z79.899 HIGH RISK MEDICATION USE: ICD-10-CM

## 2023-01-18 DIAGNOSIS — M33.20 POLYMYOSITIS ASSOCIATED WITH AUTOIMMUNE DISEASE: ICD-10-CM

## 2023-01-18 DIAGNOSIS — M35.1 MIXED CONNECTIVE TISSUE DISEASE: ICD-10-CM

## 2023-01-18 DIAGNOSIS — R74.8 ELEVATED CK: ICD-10-CM

## 2023-01-18 DIAGNOSIS — R13.13 PHARYNGEAL DYSPHAGIA: ICD-10-CM

## 2023-01-18 LAB
ALBUMIN SERPL-MCNC: 3.5 G/DL (ref 3.4–4.8)
ALBUMIN/GLOB SERPL: 0.8 RATIO (ref 1.1–2)
ALP SERPL-CCNC: 89 UNIT/L (ref 40–150)
ALT SERPL-CCNC: 19 UNIT/L (ref 0–55)
AST SERPL-CCNC: 22 UNIT/L (ref 5–34)
BASOPHILS # BLD AUTO: 0.07 X10(3)/MCL (ref 0–0.2)
BASOPHILS NFR BLD AUTO: 0.7 %
BILIRUBIN DIRECT+TOT PNL SERPL-MCNC: 0.3 MG/DL
BUN SERPL-MCNC: 7.4 MG/DL (ref 9.8–20.1)
C3 SERPL-MCNC: 144 MG/DL (ref 80–173)
C4 SERPL-MCNC: 21.5 MG/DL (ref 13–46)
CALCIUM SERPL-MCNC: 9.9 MG/DL (ref 8.4–10.2)
CHLORIDE SERPL-SCNC: 108 MMOL/L (ref 98–107)
CK SERPL-CCNC: 137 U/L (ref 29–168)
CO2 SERPL-SCNC: 30 MMOL/L (ref 23–31)
CREAT SERPL-MCNC: 0.75 MG/DL (ref 0.55–1.02)
CRP SERPL-MCNC: 1.1 MG/L
EOSINOPHIL # BLD AUTO: 0.13 X10(3)/MCL (ref 0–0.9)
EOSINOPHIL NFR BLD AUTO: 1.4 %
ERYTHROCYTE [DISTWIDTH] IN BLOOD BY AUTOMATED COUNT: 14.9 % (ref 11.5–17)
ERYTHROCYTE [SEDIMENTATION RATE] IN BLOOD: 55 MM/HR (ref 0–20)
GFR SERPLBLD CREATININE-BSD FMLA CKD-EPI: >60 MLS/MIN/1.73/M2
GLOBULIN SER-MCNC: 4.6 GM/DL (ref 2.4–3.5)
GLUCOSE SERPL-MCNC: 97 MG/DL (ref 82–115)
HCT VFR BLD AUTO: 39.9 % (ref 37–47)
HGB BLD-MCNC: 11.7 GM/DL (ref 12–16)
IMM GRANULOCYTES # BLD AUTO: 0.02 X10(3)/MCL (ref 0–0.04)
IMM GRANULOCYTES NFR BLD AUTO: 0.2 %
LYMPHOCYTES # BLD AUTO: 4.21 X10(3)/MCL (ref 0.6–4.6)
LYMPHOCYTES NFR BLD AUTO: 44.3 %
MCH RBC QN AUTO: 24.9 PG
MCHC RBC AUTO-ENTMCNC: 29.3 MG/DL (ref 33–36)
MCV RBC AUTO: 85.1 FL (ref 80–94)
MONOCYTES # BLD AUTO: 0.78 X10(3)/MCL (ref 0.1–1.3)
MONOCYTES NFR BLD AUTO: 8.2 %
NEUTROPHILS # BLD AUTO: 4.29 X10(3)/MCL (ref 2.1–9.2)
NEUTROPHILS NFR BLD AUTO: 45.2 %
NRBC BLD AUTO-RTO: 0 %
PLATELET # BLD AUTO: 234 X10(3)/MCL (ref 130–400)
PMV BLD AUTO: 10.3 FL (ref 7.4–10.4)
POTASSIUM SERPL-SCNC: 4.4 MMOL/L (ref 3.5–5.1)
PROT SERPL-MCNC: 8.1 GM/DL (ref 5.8–7.6)
RBC # BLD AUTO: 4.69 X10(6)/MCL (ref 4.2–5.4)
SODIUM SERPL-SCNC: 144 MMOL/L (ref 136–145)
WBC # SPEC AUTO: 9.5 X10(3)/MCL (ref 4.5–11.5)

## 2023-01-18 PROCEDURE — 82085 ASSAY OF ALDOLASE: CPT

## 2023-01-18 PROCEDURE — 86140 C-REACTIVE PROTEIN: CPT

## 2023-01-18 PROCEDURE — 85651 RBC SED RATE NONAUTOMATED: CPT

## 2023-01-18 PROCEDURE — 99205 OFFICE O/P NEW HI 60 MIN: CPT | Mod: S$PBB,,, | Performed by: INTERNAL MEDICINE

## 2023-01-18 PROCEDURE — 86160 COMPLEMENT ANTIGEN: CPT

## 2023-01-18 PROCEDURE — 36415 COLL VENOUS BLD VENIPUNCTURE: CPT

## 2023-01-18 PROCEDURE — 82550 ASSAY OF CK (CPK): CPT

## 2023-01-18 PROCEDURE — 99215 OFFICE O/P EST HI 40 MIN: CPT | Mod: PBBFAC | Performed by: INTERNAL MEDICINE

## 2023-01-18 PROCEDURE — 80053 COMPREHEN METABOLIC PANEL: CPT

## 2023-01-18 PROCEDURE — 85025 COMPLETE CBC W/AUTO DIFF WBC: CPT

## 2023-01-18 PROCEDURE — 99205 PR OFFICE/OUTPT VISIT, NEW, LEVL V, 60-74 MIN: ICD-10-PCS | Mod: S$PBB,,, | Performed by: INTERNAL MEDICINE

## 2023-01-18 RX ORDER — NINTEDANIB 150 MG/1
1 CAPSULE ORAL
COMMUNITY
Start: 2022-09-01 | End: 2023-03-27

## 2023-01-18 RX ORDER — HUMAN IMMUNOGLOBULIN G 5 G/50ML
LIQUID INTRAVENOUS
COMMUNITY
Start: 2022-11-16 | End: 2023-06-26

## 2023-01-18 RX ORDER — ALBUTEROL SULFATE 90 UG/1
1 AEROSOL, METERED RESPIRATORY (INHALATION)
COMMUNITY
Start: 2022-08-04 | End: 2023-06-26

## 2023-01-18 RX ORDER — PREDNISOLONE/GATIFLOX/NEPAFEN 1-0.5-0.1%
1 SUSPENSION, DROPS(FINAL DOSAGE FORM)(ML) OPHTHALMIC (EYE) 2 TIMES DAILY
COMMUNITY
Start: 2022-08-03 | End: 2023-03-17 | Stop reason: SDUPTHER

## 2023-01-18 RX ORDER — HYDROCORTISONE 5 MG/1
TABLET ORAL
COMMUNITY
End: 2023-01-18 | Stop reason: ALTCHOICE

## 2023-01-18 RX ORDER — ACETAMINOPHEN 325 MG/1
TABLET ORAL
COMMUNITY
Start: 2022-11-15 | End: 2023-03-27

## 2023-01-18 RX ORDER — CHLOPHEDIANOL HCL AND PYRILAMINE MALEATE 12.5; 12.5 MG/5ML; MG/5ML
10 SOLUTION ORAL 3 TIMES DAILY
COMMUNITY
Start: 2022-08-04 | End: 2023-06-26

## 2023-01-18 RX ORDER — CEFUROXIME AXETIL 500 MG/1
TABLET ORAL
COMMUNITY
Start: 2022-08-19 | End: 2023-06-26

## 2023-01-18 RX ORDER — CYCLOSPORINE 0.5 MG/ML
EMULSION OPHTHALMIC
COMMUNITY
Start: 2023-01-17 | End: 2023-07-25 | Stop reason: ALTCHOICE

## 2023-01-18 RX ORDER — AMLODIPINE BESYLATE 10 MG/1
10 TABLET ORAL
COMMUNITY
Start: 2022-08-08 | End: 2023-06-26

## 2023-01-18 RX ORDER — CEFDINIR 300 MG/1
CAPSULE ORAL
COMMUNITY
Start: 2022-09-13 | End: 2023-03-27

## 2023-01-18 RX ORDER — IPRATROPIUM BROMIDE AND ALBUTEROL SULFATE 2.5; .5 MG/3ML; MG/3ML
SOLUTION RESPIRATORY (INHALATION)
COMMUNITY
End: 2023-06-26

## 2023-01-18 RX ORDER — PREDNISONE 5 MG/1
7.5 TABLET ORAL DAILY
Qty: 30 TABLET | Refills: 1 | Status: SHIPPED | OUTPATIENT
Start: 2023-01-18 | End: 2023-02-17

## 2023-01-18 RX ORDER — LORATADINE 10 MG/1
TABLET ORAL
COMMUNITY
Start: 2022-11-15 | End: 2023-06-26

## 2023-01-18 RX ORDER — AZITHROMYCIN 500 MG/1
TABLET, FILM COATED ORAL
COMMUNITY
Start: 2022-08-19 | End: 2023-03-27

## 2023-01-18 RX ORDER — PREDNISONE 10 MG/1
10 TABLET ORAL
COMMUNITY
Start: 2022-11-22 | End: 2023-01-18 | Stop reason: SDUPTHER

## 2023-01-18 RX ORDER — IPRATROPIUM BROMIDE 42 UG/1
2 SPRAY, METERED NASAL 2 TIMES DAILY PRN
COMMUNITY
Start: 2022-09-13

## 2023-01-18 RX ORDER — SODIUM CHLORIDE FOR INHALATION 3 %
VIAL, NEBULIZER (ML) INHALATION
COMMUNITY
Start: 2022-08-04 | End: 2023-06-26

## 2023-01-18 RX ORDER — PANTOPRAZOLE SODIUM 40 MG/1
40 TABLET, DELAYED RELEASE ORAL 2 TIMES DAILY
COMMUNITY
Start: 2022-08-08 | End: 2023-07-05 | Stop reason: SDUPTHER

## 2023-01-18 RX ORDER — FOLIC ACID 1 MG/1
1000 TABLET ORAL
COMMUNITY
Start: 2022-08-08 | End: 2023-06-26

## 2023-01-18 RX ORDER — PREDNISOLONE ACETATE 10 MG/ML
1 SUSPENSION/ DROPS OPHTHALMIC 2 TIMES DAILY
COMMUNITY
Start: 2022-08-03 | End: 2023-12-12

## 2023-01-18 NOTE — TELEPHONE ENCOUNTER
Labs ok, would like to send refill on CellCept, ask her to start taking it.   - Which pharmacy should I send it to?

## 2023-01-18 NOTE — PROGRESS NOTES
Patient ID: [id number]     Chief Complaint: Follow-up (Has felt the same, no changes. No pain or discomfort/Needing refill of Cellcept and Prednisone/)      HPI:     Devi Fernandes is a 72 y.o. female here today for establishment for anti synthetase syndrome.       She was diagnosed with lupus in 2008. She had rheumatologist in [place], does not remember the name. She was treated with prednisone. She was treated with some medications back then, she does not remember the name on medications. She may have been on lmuran, methotrexate and hydroxychloroquine in the past. Used higher dose of prednisone in the past. She was on prednisone daily in the past.     She had fractured left 4th metatarsal, when she tripped while getting out of car wearing tight heels with straps.   Taking CellCept, she has dysphagia, cannot take tablets, she was taking liquid CellCept, taking 1500 mg twice daily, tolerating it well without any issues.   She was hospitalized for pneumonia in 8/2022, cough and shortness of breath is slightly better. Completed antibiotics and high dose steroids. Had flare of anti synthetase syndrome at that time, added IVIG.  She took IVIG for few months and stopped since November 2022, she does not like to get those infusions, she does not find them helpful(even though flare was improved with it, CPK normalized) she does not like to continue it and said she will consider it if she flares again.   Currently taking prednisone 10 mg daily.   Started taking OFEV, Dr Llanes started her on it. So far tolerating it ok.   Had seen neurology and ENT, Dr Miranda.   She started working in [employer] in 3/2022. C/o tiredness and fatigue form that.   She has joint pain in knees and other joints. Also has pain in fingers. No aggravating factors. Knee pain better with rest. Hard for her to open jars. Morning stiffness for 5 min. Denies red, warm and swollen joints.   Admits swallowing issues since 2010. She has to swallow  small pieces. Swallowing issues getting worse. She chokes on solids and liquids. Food does not get stuck in chest. Symptoms are stable.   Admits chronic shortness of breath, stable. Denies productive cough.   Admits color changes in hands for the last 15 years, since . Cold is trigger. She will have when its cold, could be twice month or every day if its cold. Episode lasts for 15 min. Never turnwhite, always purple In tips.   She had surgery for vocal cord polyps in  and since then she has dysphonia. Voice issues are same since , not getting worse.     Autoimmune diseases in family: none.   Denies fevers, oral or nasal ulcers, rashes, photosensitivity, history of DVT or PE, history of stroke or seizures, history of Ml, history of inflammatory eye diseases, history of malignancy.   Miscarriages: 2, firsttrimester miscarriages. Pregnancies: 3   Smoking: nonsmoker.   Denies recent hospitalizations.     Social History     Tobacco Use   Smoking Status Never   Smokeless Tobacco Never          ----------------------------  Allergy  Aortic regurgitation  Arthritis  BMI 22.0-22.9, adult  Deviated nasal septum  Diabetes mellitus  Dysphagia  GERD (gastroesophageal reflux disease)  Hypertension  PAOLA (obstructive sleep apnea)  Pulmonary fibrosis  Pulmonary hypertension  Pulmonic regurgitation  Restrictive lung disease  Sinus bradycardia  Valvular heart disease     Past Surgical History:   Procedure Laterality Date    APPENDECTOMY      CARDIAC CATHETERIZATION  2017     SECTION      COLONOSCOPY  2011    ESOPHAGEAL MANOMETRY  2018    EYE SURGERY  2021    Cataract surgery    GASTROSCOPY  02/15/2018    HYSTERECTOMY  2003    Vocal Cord Surgery  2017    cyst removed       Review of patient's allergies indicates:   Allergen Reactions    Codeine Other (See Comments)     Becomes weak and over heated.   Other reaction(s): unknown  Hot flashes       Outpatient Medications Marked as Taking for the  1/18/23 encounter (Office Visit) with Rad Manzanares MD   Medication Sig Dispense Refill    acetaminophen (TYLENOL) 325 MG tablet Take by mouth.      albuterol (PROVENTIL/VENTOLIN HFA) 90 mcg/actuation inhaler 1 puff as needed.      amLODIPine (NORVASC) 5 MG tablet Take 5 mg by mouth once daily.      carvedilol (COREG) 3.125 MG tablet Take 3.125 mg by mouth 2 (two) times daily with meals.      folic acid (FOLVITE) 1 MG tablet Take 1,000 mcg by mouth.      hypromellose (SYSTANE GEL OPHT) Apply 1 drop to eye 3 (three) times daily. I gel drop to each eye TID      ipratropium (ATROVENT) 42 mcg (0.06 %) nasal spray INSTILL 2 SPRAYS INTO EACH NOSTRIL TWO TIMES A DAY      nintedanib (OFEV) 150 mg Cap 1 capsule.      prednisoLONE acetate (PRED FORTE) 1 % DrpS Place 1 drop into both eyes 2 (two) times daily.      PRIVIGEN 10 % Soln Inject into the vein.      sodium chloride 3% 3 % nebulizer solution 4 ml      [DISCONTINUED] mycophenolate mofetil (CELLCEPT) 200 mg/mL SusR Take by mouth.      [DISCONTINUED] predniSONE (DELTASONE) 10 MG tablet Take 10 mg by mouth.         Social History     Socioeconomic History    Marital status:    Tobacco Use    Smoking status: Never    Smokeless tobacco: Never   Substance and Sexual Activity    Alcohol use: No    Drug use: No    Sexual activity: Not Currently     Partners: Female     Birth control/protection: Abstinence        Family History   Problem Relation Age of Onset    Cancer Mother     Diabetes Mellitus Father     Diabetes Father     Vision loss Father         Immunization History   Administered Date(s) Administered    Influenza - High Dose - PF (65 years and older) 12/04/2017, 09/17/2019, 09/30/2020    Pneumococcal Conjugate - 13 Valent 02/06/2019    Td (Adult), Unspecified Formulation 10/21/2020    Tdap 02/06/2019       Patient Care Team:  Ariana Grewal MD as PCP - General (Internal Medicine)     Subjective:     ROS    Constitutional:  Denies chills. Denies fever.  "Denies night sweats. Denies weight loss.   Ophthalmology: Denies blurred vision. Denies dry eyes. Denies eye pain. Denies Itching and redness.   ENT: Denies oral ulcers. Denies epistaxis. Denies dry mouth. Denies swollen glands.   Endocrine: Denies diabetes. Denies thyroid Problems.   Respiratory: Denies cough. Denies shortness of breath. Denies shortness of breath with exertion. Denies hemoptysis.   Cardiovascular: Denies chest pain at rest. Denies chest pain with exertion. Denies palpitations.    Gastrointestinal: Denies abdominal pain. Denies diarrhea. Denies nausea. Denies vomiting. Denies hematemesis or hematochezia. Denies heartburn.  Genitourinary: Denies blood in urine.  Musculoskeletal: See HPI for details  Integumentary: Denies rash. Denies photosensitivity.   Peripheral Vascular: Denies Ulcers of hands and/or feet. Denies Cold extremities.   Neurologic: Denies dizziness. Denies headache.  Denies loss of strength. Denies numbness or tingling.   Psychiatric: Denies depression. Denies anxiety. Denies suicidal/homicidal ideations.      Objective:     /74 (BP Location: Right arm, Patient Position: Sitting)   Pulse 95   Temp 98.5 °F (36.9 °C) (Oral)   Resp 16   Ht 5' 9" (1.753 m)   Wt 70.6 kg (155 lb 9.6 oz)   SpO2 99%   BMI 22.98 kg/m²     Physical Exam    General Appearance: alert, pleasant, in no acute distress.  Skin: Skin color, texture, turgor normal. No rashes or lesions. Few capillary dilatations, no drop outs.   Eyes:  extraocular movement intact (EOMI), pupils equal, round, reactive to light and accommodation, conjunctiva clear.  ENT: No oral or nasal ulcers.  Neck:  Neck supple. No adenopathy.   Lungs: fine crackles in bases.   Heart: RRR w/o murmurs.  No edema. Soft systolic murmur present.   Abdomen: Soft, non-tender, no masses, rebound or guarding.  Neuro: Alert, oriented, CN II-XII GI, sensory and motor innervation intact.  Psych: Alert, oriented, normal eye contact.    Joint " Exam:  DIPs, PIPs, MCPs: no pain on palpation, no swelling or redness, no nodules. DJD of bilateral hands.   Wrists: no pain on palpation, no swelling or redness, good range of motion.  Elbows: no pain on palpation, no swelling or redness, good range of motion, no nodules.  Shoulders: no pain on palpation, no swelling or redness, good range of motion.  Knees: no pain on palpation, no swelling or redness, good range of motion.  Ankles: no pain on palpation, no swelling or redness, good range of motion.  Feet: no pain on palpation, no swelling or redness, no nodules.    Labs:     August 2021: CMP okay. CBC okay. SPEP showed polyclonal gammopathy and elevated beta proteins. 2/18/2022 :ESR, CRP normal. CBC okay. CMP okay. Globulin and protein slightly high. CPK normal.  mg/g. Rheumatoid factor and anti CCP negative. Positive RNP antibody, 4.6. Positive SSA, 2.7. Negative SCL 70, SSB, Smith, dsDNA, thyroglobulin, TPO, RNA polymerase 3, centromere antibodies. Aldolase normal. ANCA, MPO and TX-3 negative. Cardiolipin, beta-2 glycoprotein negative. LAC negative. Positive ISMAEL, nuclear, cytoplasmic and homogeneous pattern, 1:320. Positive myositis antibody, EJ 78, high titer, PL? is 12, Ml-2 is 17. C3, C4 normal. No blood and protein in urine.   3/24/22: Hep B, C, HIV, quantiferon tb negative.   5/3/2022 ROW elevated 15.0. CBC okay. Glucose 129. CMP okay. CPK and aldolase normal. ESR 24, CRP normal. TPMT low, low metaboilizer.   6/10/2022: CMP okay. LFTs normal. CPK and aldoalse normal. Hemoglobin 11.8. ESR 45, normal less than 30. CRP normal. No blood and protein in urine. C3, C4 okay.  mg/g.   8/17/2022- during hospitalization: AST elevated 72, otherwise CMP okay. CPK greater than 2000. Elevated WBC count, otherwise CBC okay. Small protein and no blood in urine.  8/25/2022: C3, C4 okay. Urine protein less than 15 mg/dL. No blood and protein in urine. Small leukocyte esterase. WBC count elevated secondary to  steroids. WBC count 13.4, hemoglobin 11.5. Bicarb slightly elevated. CMP okay otherwise. CPK elevated . ESR 40, CRP normal. Aldolase 28.       Lab Results   Component Value Date    WBC 9.5 2023    HGB 11.7 (L) 2023    HCT 39.9 2023     2023    ALT 19 2023    AST 22 2023    BUN 7.4 (L) 2023    CREATININE 0.75 2023     2023    K 4.4 2023     2019    CO2 30 2023    CRP 1.10 2023    SEDRATE 55 (H) 2023        Imagin2021: CT neck soft tissue: no soft tissue abnormalities. Of cervical spine. No enlarged lymph nodes. Thyroid normal.   Retropharyngeal space unremarkable. Normal larynx.   Swallowing study: 10/2021: No penetration or aspiration. Speech pathology evaluation showed moderate to severe oropharyngeal dysphagia, lingual weakness, reduced range of motion and coordination, pharyngeal delay, refused hypo laryngeal elevation, reduced epiglottic inversion and pharyngeal squeeze. Recommend softer fine chopped meats thin liquids, no straws. Crushed meds in puree consistency. Aspiration precautions. ENT and neurological evaluation is recommended.     22: Chest x-ray showed coarse reticular opacities in the mid and lower lungs, unchanged from 2021. Calcaneal spurring bilaterally, no erosions. Normal x-ray of bilateral knees. Normal x ray of bilateral hands, no erosions.     Echocardiogram 2021: Concentric left ventricular hypertrophy. Diastolic dysfunction. Mild aortic insufficiency. Moderate pulmonary hypertension. EF 55 to 60%. PASP 41 mmHg.     3/16/2022 :CT chest showed old granulomatous disease. Predominantly bibasilar interstitial pulmonary fibrosis. Minimal peripheraI honeycombing. Extensive reticuIonoduIar Iung changes and small calcified lung nodules. Bronchiectasis present. Slightly enlarged mediastinal lymph nodes.     10/28/2021: PFTs: FVC 71%, FEV1 83%, ratio 91%, total lung  capacity 77%, DLCO 22%, DLCONA 44%.    Previous rheumatology records from [place]: Dr. Mynor Delarosa: Clinic visit in October 2020: Diagnosed with lupus, pulmonary fibrosis, pulmonary hypertension. She was treated with methotrexate and Plaquenil in the past. Patient has stable and ongoing shortness of PFTs showed markedly low DLCO. Off of her medications currently. Labs unremarkable. Need to order high resolution. Nonproductive cough. Muscle strength normal. Chest x ray October 2020. Dense reticulonodular fibrosis at the lung bases.       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 Polymyositis associated with autoimmune disease  M33.20 710.4    M35.9 279.49   3. ILD (interstitial lung disease)  J84.9 515   4. Pharyngeal dysphagia  R13.13 787.23   5. Dysphonia  R49.0 784.42   6. Mixed connective tissue disease  M35.1 710.8   7. Elevated CK  R74.8 790.5   8. High risk medication use  Z79.899 V58.69   9. On prednisone therapy  Z79.52 V58.65        Plan:     72-year-old pleasant -American woman with history of lupus, rheumatoid arthritis, mixed connective tissue disease, interstitial lung disease, vocal cord dysfunction, dysphagia here for establishment of care of antisynthetase syndrome.     1. Antisynthetase syndrome:Positive myositis antibody, EJ 78, high titer, PL7 is 12, Ml-2 is 17. Positive myositis panel, questionable Raynaud's phenomenon, dysphagia, dysphonia. Intermittent elevation of CPK, had flare in 8/2022. Discussed the disease course and treatment options of antisynthetase syndrome. Positive SSA ab associated with ILD and ATS, poor prognosis.  - Started IVIG infusions as she has hoarseness of voice and dysphagia with elevated CPK. Recieved first dose of IVIG 2 g/kg, 130 mg total dose on 9/6/22. Tolerated IVIG well. She had received few more doses and stopped in 12/2022. She does not like to take infusions and will consider in the future if she flares  again, refusing to take it currently.  - Decrease prednisone to 7.5 mg daily.   - C/w CellCept 1500 mg twice daily.   - Labs today.  - Need to repeat CT chest and PFT's after next visit.      2. Color changes in hands: Does not have typical color changes of Raynaud's phenomenon, saw pictures in her phone, hands turn dark when its cold. Few dilated capillary nail folds, no dropouts on exam.      3. ILD (interstitial lung disease): Interstitial lung disease: DLCO low, 22% in October 2021. CT chest showed bibasilar reticulonodular changes and mild honeycombing in March 2022. Admits chronic cough, SOB with exertion. She was referred to pulmonary, she had seen Dr Llanes. Continue close follow-up with Dr. Llanes. Started OFEV 150 mg BID.     4. Dysphonia/dysphagia: Dysphonia is stable. Dysphagia is getting worse. Dysphagia and dysphonia secondary to antisynthetase syndrome. Swallow evaluation showed severe oropharyngeal dysphagia. Recommend ENT and neurological evaluation. She had seen neurologist Dr Francine Goss and ENT Dr Miranda.      5. Mixed connective tissue disease: She was treated with lmuran, methotrexate and Plaquenil in the past. Positive ISMAEL, nuclear, cytoplasmic and homogeneous pattern, 1:320. Positive RNP antibody, 4.6. Positive SSA, 2.7. Positive ISMAEL and SSA antibody likely associated with antisynthetase syndrome. She has features of mixed connective tissue disease and antisynthetase syndrome.      7. Heart murmur, history of heart issues: Requested and reviewed records from cardiology Dr Warren. Echocardiogram in September 2021 showed diastolic dysfunction, normal systolic function and elevated PASP 41 mmHg. Continue close follow-up with cardiology and pulmonary.      8. High risk medication use: Persons with rheumatoid arthritis, lupus, psoriatic arthritis and other autoimmune diseases are at increased risk of cardiovascular disease including heart attack and stroke. We recommend that all patients with  these conditions have annual health maintenance exams including lipid measurements, blood pressure measurements, and smoking cessation counseling when applicable at their primary care provider's office.   - Advised to stay up-to-date on age appropriate vaccinations and malignancy screening, including yearly skin exams.       9. Bone health: Advised to take multivitamin and calcium supplementations. She had DXA done in 8/2021, Dr Rebollar monitoring it. Continue follow up with Dr Rebollar for screening of osteoporosis.         Follow up in about 2 months (around 3/18/2023). In addition to their scheduled follow up, the patient has also been instructed to follow up on as needed basis.      Total time spent with patient and documentation is more than 60 minutes. All questions were answered to patient's satisfaction and patient verbalized understanding.

## 2023-01-19 LAB — ALDOLASE SERPL-CCNC: 4.8 U/L

## 2023-01-19 RX ORDER — MYCOPHENOLATE MOFETIL 200 MG/ML
1500 POWDER, FOR SUSPENSION ORAL 2 TIMES DAILY
Qty: 450 ML | Refills: 3 | Status: SHIPPED | OUTPATIENT
Start: 2023-01-19 | End: 2023-03-27 | Stop reason: SDUPTHER

## 2023-01-31 ENCOUNTER — TELEPHONE (OUTPATIENT)
Dept: RHEUMATOLOGY | Facility: CLINIC | Age: 73
End: 2023-01-31
Payer: MEDICARE

## 2023-01-31 NOTE — TELEPHONE ENCOUNTER
Received VM from inbound call  Bambi, pharmacist w/[facility] called asking about status of IVIg infusions and pt continues to tell them infusions are on hold  Will message Dr. Manzanares to confirm status

## 2023-02-01 NOTE — TELEPHONE ENCOUNTER
"Response received from Dr. Manzanares:  MD Estella Saxena, RN  Caller: Unspecified (Yesterday,  1:45 PM)  Yes, hold it for now, patient resisting not to take it. Labs were ok and she agreed if there are any flares in future will start it. Ok to hold for now.     Dr Manzanares     Tried to call [facility] to notify them  Tried multiple times and confirmed I dialed the correct # - per recording "call cannot be completed as dialed"  Will try another day  "

## 2023-02-07 NOTE — TELEPHONE ENCOUNTER
Called [facility] and advised them IVIg is on hold  They will fax order stating this for Dr. Manzanares to sign

## 2023-02-23 ENCOUNTER — TELEPHONE (OUTPATIENT)
Dept: RHEUMATOLOGY | Facility: CLINIC | Age: 73
End: 2023-02-23
Payer: MEDICARE

## 2023-02-23 NOTE — TELEPHONE ENCOUNTER
She was scheduled for PFT's on 2/14/23, I do not have the results, did she do those, where? Please request records.

## 2023-02-23 NOTE — TELEPHONE ENCOUNTER
Called pt to confirm PFT was done  She stated she did it on the 14th of this month  Dr. Llanes was the one who ordered it  It was done at [facility]  I let her know that we will need to request these records so Dr. Manzanares can look over them.  She confirmed understandin

## 2023-03-17 DIAGNOSIS — M35.1 MIXED CONNECTIVE TISSUE DISEASE: Primary | ICD-10-CM

## 2023-03-17 RX ORDER — PREDNISONE 10 MG/1
5 TABLET ORAL DAILY
Qty: 45 TABLET | Refills: 0 | Status: SHIPPED | OUTPATIENT
Start: 2023-03-17 | End: 2023-03-27 | Stop reason: SDUPTHER

## 2023-03-17 RX ORDER — PREDNISONE 10 MG/1
1 TABLET ORAL
COMMUNITY
End: 2023-03-17 | Stop reason: SDUPTHER

## 2023-03-17 NOTE — TELEPHONE ENCOUNTER
----- Message from Yasmin Henderson sent at 3/16/2023  2:24 PM CDT -----  Regarding: Med refill  Pt called for a refill of predniSONE (DELTASONE) 5 MG tablet to be sent to [address]. Pt also stated that she is to take 1.5 tablets and with doing that the script does not last the month. Contact info is [phone]          Thank You  Cherie RAINEY

## 2023-03-17 NOTE — TELEPHONE ENCOUNTER
Pt was called  She is scheduled 3/27 at 9am.   She confirmed this appt date and time worked    Also let her know we refilled her medication and will reevaluate dosage at 3/27 visit.

## 2023-03-27 ENCOUNTER — TELEPHONE (OUTPATIENT)
Dept: RHEUMATOLOGY | Facility: CLINIC | Age: 73
End: 2023-03-27

## 2023-03-27 ENCOUNTER — OFFICE VISIT (OUTPATIENT)
Dept: RHEUMATOLOGY | Facility: CLINIC | Age: 73
End: 2023-03-27
Payer: MEDICARE

## 2023-03-27 VITALS
RESPIRATION RATE: 16 BRPM | WEIGHT: 156 LBS | HEIGHT: 66 IN | DIASTOLIC BLOOD PRESSURE: 69 MMHG | TEMPERATURE: 98 F | BODY MASS INDEX: 25.07 KG/M2 | HEART RATE: 76 BPM | SYSTOLIC BLOOD PRESSURE: 136 MMHG

## 2023-03-27 DIAGNOSIS — J84.9 ILD (INTERSTITIAL LUNG DISEASE): ICD-10-CM

## 2023-03-27 DIAGNOSIS — M35.1 MIXED CONNECTIVE TISSUE DISEASE: ICD-10-CM

## 2023-03-27 DIAGNOSIS — R49.0 DYSPHONIA: ICD-10-CM

## 2023-03-27 DIAGNOSIS — R13.13 PHARYNGEAL DYSPHAGIA: ICD-10-CM

## 2023-03-27 DIAGNOSIS — D89.89 ANTISYNTHETASE SYNDROME: Primary | ICD-10-CM

## 2023-03-27 DIAGNOSIS — R74.8 ELEVATED CK: ICD-10-CM

## 2023-03-27 DIAGNOSIS — M35.9 POLYMYOSITIS ASSOCIATED WITH AUTOIMMUNE DISEASE: ICD-10-CM

## 2023-03-27 DIAGNOSIS — M33.20 POLYMYOSITIS ASSOCIATED WITH AUTOIMMUNE DISEASE: ICD-10-CM

## 2023-03-27 DIAGNOSIS — Z79.52 ON PREDNISONE THERAPY: ICD-10-CM

## 2023-03-27 DIAGNOSIS — Z79.899 HIGH RISK MEDICATION USE: ICD-10-CM

## 2023-03-27 PROCEDURE — 99215 OFFICE O/P EST HI 40 MIN: CPT | Mod: PBBFAC | Performed by: INTERNAL MEDICINE

## 2023-03-27 PROCEDURE — 99215 PR OFFICE/OUTPT VISIT, EST, LEVL V, 40-54 MIN: ICD-10-PCS | Mod: S$PBB,,, | Performed by: INTERNAL MEDICINE

## 2023-03-27 PROCEDURE — 99215 OFFICE O/P EST HI 40 MIN: CPT | Mod: S$PBB,,, | Performed by: INTERNAL MEDICINE

## 2023-03-27 RX ORDER — MYCOPHENOLATE MOFETIL 200 MG/ML
1500 POWDER, FOR SUSPENSION ORAL 2 TIMES DAILY
Qty: 450 ML | Refills: 3 | Status: SHIPPED | OUTPATIENT
Start: 2023-03-27 | End: 2023-06-26

## 2023-03-27 RX ORDER — PREDNISONE 5 MG/1
7.5 TABLET ORAL DAILY
Qty: 45 TABLET | Refills: 3 | Status: SHIPPED | OUTPATIENT
Start: 2023-03-27 | End: 2023-04-26

## 2023-03-27 NOTE — PROGRESS NOTES
Patient ID: [id number]     Chief Complaint: Lupus (No complaints today. PFT done 2/14; awaiting results.)      HPI:     Devi Fernandes is a 72 y.o. female here today for establishment for anti synthetase syndrome.       She was diagnosed with lupus in 2008. She had seen rheumatologist in [place] in the past, does not remember the name. She was treated with prednisone. She was treated with some medications back then, she does not remember the name on medications. She may have been on lmuran, methotrexate and hydroxychloroquine in the past. Used higher dose of prednisone in the past. She was on prednisone daily in the past.     She had fractured left 4th metatarsal in winter 2022, when she tripped while getting out of car wearing tight heels with straps.   She has dysphagia, cannot take tablets, she was taking liquid CellCept, should be taking 1500 mg twice daily. Stopped taking it for last 2 months, does not like to take medications, thought her speech was getting worse on it. Had not seen ENT lately.   She was hospitalized for pneumonia in 8/2022, cough and shortness of breath is slightly better. Completed antibiotics and high dose steroids. Had flare of anti synthetase syndrome at that time, added IVIG.  She took IVIG for few months and stopped since November 2022, she does not like to get those infusions, she does not find them helpful(even though flare was improved with it, CPK normalized) she does not like to continue it and said she will consider it if she flares again.   Currently taking prednisone 7.5 mg daily.   Dr Llanes had discussed OFEV, she never started it because of swallowing issues.   Had seen neurology and ENT, Dr Miranda.   She started working in [employer] in 3/2022. C/o tiredness and fatigue form that.   She has joint pain in knees and other joints. Also has pain in fingers. No aggravating factors. Knee pain better with rest. Hard for her to open jars. Morning stiffness for 5 min. Denies  red, warm and swollen joints.   Admits swallowing issues since . She has to swallow small pieces. Swallowing issues getting worse. She chokes on solids and liquids. Food does not get stuck in chest. Symptoms are stable.   Admits chronic shortness of breath, stable. Denies productive cough.   Admits color changes in hands for the last 15 years, since . Cold is trigger. She will have when its cold, could be twice month or every day if its cold. Episode lasts for 15 min. Never turnwhite, always purple in tips.   She had surgery for vocal cord polyps in  and since then she has dysphonia. Voice issues are same since , not getting worse. She follows with Dr Miranda.     Autoimmune diseases in family: none.   Denies fevers, oral or nasal ulcers, rashes, photosensitivity, history of DVT or PE, history of stroke or seizures, history of Ml, history of inflammatory eye diseases, history of malignancy.   Miscarriages: 2, first trimester miscarriages. Pregnancies: 3   Smoking: nonsmoker.   Denies recent hospitalizations.     Social History     Tobacco Use   Smoking Status Never   Smokeless Tobacco Never          ----------------------------  Allergy  Aortic regurgitation  Arthritis  BMI 22.0-22.9, adult  Deviated nasal septum  Diabetes mellitus  Dysphagia  GERD (gastroesophageal reflux disease)  Hypertension  PAOLA (obstructive sleep apnea)  Pulmonary fibrosis  Pulmonary hypertension  Pulmonic regurgitation  Restrictive lung disease  Sinus bradycardia  Valvular heart disease     Past Surgical History:   Procedure Laterality Date    APPENDECTOMY      CARDIAC CATHETERIZATION  2017     SECTION      COLONOSCOPY  2011    ESOPHAGEAL MANOMETRY  2018    EYE SURGERY  2021    Cataract surgery    GASTROSCOPY  02/15/2018    HYSTERECTOMY  2003    Vocal Cord Surgery  2017    cyst removed       Review of patient's allergies indicates:   Allergen Reactions    Codeine Other (See Comments)      Becomes weak and over heated.   Other reaction(s): unknown  Hot flashes       Outpatient Medications Marked as Taking for the 3/27/23 encounter (Office Visit) with Rad Manzanares MD   Medication Sig Dispense Refill    amLODIPine (NORVASC) 5 MG tablet Take 5 mg by mouth once daily.      carvedilol (COREG) 3.125 MG tablet Take 3.125 mg by mouth 2 (two) times daily with meals.      folic acid (FOLVITE) 1 MG tablet Take 1,000 mcg by mouth.      hypromellose (SYSTANE GEL OPHT) Apply 1 drop to eye 3 (three) times daily. I gel drop to each eye TID      [DISCONTINUED] pantoprazole (PROTONIX) 20 MG tablet Take 20 mg by mouth 2 (two) times daily before meals.      [DISCONTINUED] predniSONE (DELTASONE) 10 MG tablet Take 0.5 tablets (5 mg total) by mouth once daily. Take 1 and a half pills a day 45 tablet 0       Social History     Socioeconomic History    Marital status:    Tobacco Use    Smoking status: Never    Smokeless tobacco: Never   Substance and Sexual Activity    Alcohol use: No    Drug use: No    Sexual activity: Not Currently     Partners: Female     Birth control/protection: Abstinence        Family History   Problem Relation Age of Onset    Cancer Mother     Diabetes Mellitus Father     Diabetes Father     Vision loss Father     Cancer Sister         Immunization History   Administered Date(s) Administered    Influenza - High Dose - PF (65 years and older) 12/04/2017, 09/17/2019, 09/30/2020    Pneumococcal Conjugate - 13 Valent 02/06/2019    Td (Adult), Unspecified Formulation 10/21/2020    Tdap 02/06/2019       Patient Care Team:  Ariana Grewal MD as PCP - General (Internal Medicine)     Subjective:     Review of Systems   Constitutional:  Negative for fever.   Eyes:  Negative for redness.   Respiratory:  Negative for cough and shortness of breath.    Cardiovascular:  Negative for chest pain.   Gastrointestinal:  Negative for constipation and diarrhea.   Genitourinary:  Negative  "for dysuria.   Skin:  Negative for rash.   Neurological:  Negative for headaches.   Endo/Heme/Allergies:  Does not bruise/bleed easily.     Constitutional:  Denies chills. Denies fever. Denies night sweats. Denies weight loss.   Ophthalmology: Denies blurred vision. Denies dry eyes. Denies eye pain. Denies Itching and redness.   ENT: Denies oral ulcers. Denies epistaxis. Denies dry mouth. Denies swollen glands.   Endocrine: Denies diabetes. Denies thyroid Problems.   Respiratory: Denies cough. Denies shortness of breath. Denies shortness of breath with exertion. Denies hemoptysis.   Cardiovascular: Denies chest pain at rest. Denies chest pain with exertion. Admits palpitations.    Gastrointestinal: Denies abdominal pain. Denies diarrhea. Denies nausea. Denies vomiting. Denies hematemesis or hematochezia. Denies heartburn.  Genitourinary: Denies blood in urine.  Musculoskeletal: See HPI for details  Integumentary: Denies rash. Denies photosensitivity.   Peripheral Vascular: Denies Ulcers of hands and/or feet. Denies Cold extremities.   Neurologic: Denies dizziness. Denies headache.  Denies loss of strength. Denies numbness or tingling.   Psychiatric: Denies depression. Denies anxiety. Denies suicidal/homicidal ideations.      Objective:     /69 (BP Location: Left arm, Patient Position: Sitting, BP Method: Medium (Automatic))   Pulse 76   Temp 98 °F (36.7 °C) (Oral)   Resp 16   Ht 5' 6" (1.676 m)   Wt 70.8 kg (156 lb)   BMI 25.18 kg/m²     Physical Exam    General Appearance: alert, pleasant, in no acute distress.  Skin: Skin color, texture, turgor normal. No rashes or lesions. Few capillary dilatations, no drop outs.   Eyes:  extraocular movement intact (EOMI), pupils equal, round, reactive to light and accommodation, conjunctiva clear.  ENT: No oral or nasal ulcers.  Neck:  Neck supple. No adenopathy.   Lungs: fine crackles in bases, clear through out.   Heart: RRR w/o murmurs.  No edema. Soft systolic " murmur present.   Abdomen: Soft, non-tender, no masses, rebound or guarding.  Neuro: Alert, oriented, CN II-XII GI, sensory and motor innervation intact.  Psych: Alert, oriented, normal eye contact.    Joint Exam:  DIPs, PIPs, MCPs: no pain on palpation, no swelling or redness, no nodules. DJD of bilateral hands.   Wrists: no pain on palpation, no swelling or redness, good range of motion.  Elbows: no pain on palpation, no swelling or redness, good range of motion, no nodules.  Shoulders: no pain on palpation, no swelling or redness, good range of motion.  Knees: no pain on palpation, no swelling or redness, good range of motion.  Ankles: no pain on palpation, no swelling or redness, good range of motion.  Feet: no pain on palpation, no swelling or redness, no nodules.    Labs:     August 2021: CMP okay. CBC okay. SPEP showed polyclonal gammopathy and elevated beta proteins. 2/18/2022 :ESR, CRP normal. CBC okay. CMP okay. Globulin and protein slightly high. CPK normal.  mg/g. Rheumatoid factor and anti CCP negative. Positive RNP antibody, 4.6. Positive SSA, 2.7. Negative SCL 70, SSB, Smith, dsDNA, thyroglobulin, TPO, RNA polymerase 3, centromere antibodies. Aldolase normal. ANCA, MPO and ME-3 negative. Cardiolipin, beta-2 glycoprotein negative. LAC negative. Positive ISMAEL, nuclear, cytoplasmic and homogeneous pattern, 1:320. Positive myositis antibody, EJ 78, high titer, PL? is 12, Ml-2 is 17. C3, C4 normal. No blood and protein in urine.   3/24/22: Hep B, C, HIV, quantiferon tb negative.   5/3/2022 ROW elevated 15.0. CBC okay. Glucose 129. CMP okay. CPK and aldolase normal. ESR 24, CRP normal. TPMT low, low metaboilizer.   6/10/2022: CMP okay. LFTs normal. CPK and aldoalse normal. Hemoglobin 11.8. ESR 45, normal less than 30. CRP normal. No blood and protein in urine. C3, C4 okay.  mg/g.   8/17/2022- during hospitalization: AST elevated 72, otherwise CMP okay. CPK greater than 2000. Elevated WBC count,  otherwise CBC okay. Small protein and no blood in urine.  2022: C3, C4 okay. Urine protein less than 15 mg/dL. No blood and protein in urine. Small leukocyte esterase. WBC count elevated secondary to steroids. WBC count 13.4, hemoglobin 11.5. Bicarb slightly elevated. CMP okay otherwise. CPK elevated 2076. ESR 40, CRP normal. Aldolase 28.   23: CMP ok. ESR 55, CRP normal. Hg 11.7. C3, C4 normal. CPK and aldolase normal.      Lab Results   Component Value Date    WBC 9.5 2023    HGB 11.7 (L) 2023    HCT 39.9 2023     2023    ALT 19 2023    AST 22 2023    BUN 7.4 (L) 2023    CREATININE 0.75 2023     2023    K 4.4 2023     2019    CO2 30 2023    CRP 1.10 2023    SEDRATE 55 (H) 2023        Imagin2021: CT neck soft tissue: no soft tissue abnormalities. Of cervical spine. No enlarged lymph nodes. Thyroid normal.   Retropharyngeal space unremarkable. Normal larynx.   Swallowing study: 10/2021: No penetration or aspiration. Speech pathology evaluation showed moderate to severe oropharyngeal dysphagia, lingual weakness, reduced range of motion and coordination, pharyngeal delay, refused hypo laryngeal elevation, reduced epiglottic inversion and pharyngeal squeeze. Recommend softer fine chopped meats thin liquids, no straws. Crushed meds in puree consistency. Aspiration precautions. ENT and neurological evaluation is recommended.     22: Chest x-ray showed coarse reticular opacities in the mid and lower lungs, unchanged from 2021. Calcaneal spurring bilaterally, no erosions. Normal x-ray of bilateral knees. Normal x ray of bilateral hands, no erosions.     Echocardiogram 2021: Concentric left ventricular hypertrophy. Diastolic dysfunction. Mild aortic insufficiency. Moderate pulmonary hypertension. EF 55 to 60%. PASP 41 mmHg.     3/16/2022 :CT chest showed old granulomatous disease.  Predominantly bibasilar interstitial pulmonary fibrosis. Minimal peripheraI honeycombing. Extensive reticuIonoduIar Iung changes and small calcified lung nodules. Bronchiectasis present. Slightly enlarged mediastinal lymph nodes.     10/28/2021: PFTs: FVC 71%, FEV1 83%, ratio 91%, total lung capacity 77%, DLCO 22%, DLCO/VA 44%.  2/14/23: PFTs: FVC 75%, FEV1 84%, ratio 86, total lung capacity 78%, DLCO 26%, DLCO/VA 52%.    Previous rheumatology records from [place]: Dr. Mynor Delarosa: Clinic visit in October 2020: Diagnosed with lupus, pulmonary fibrosis, pulmonary hypertension. She was treated with methotrexate and Plaquenil in the past. Patient has stable and ongoing shortness of PFTs showed markedly low DLCO. Off of her medications currently. Labs unremarkable. Need to order high resolution. Nonproductive cough. Muscle strength normal. Chest x ray October 2020. Dense reticulonodular fibrosis at the lung bases.       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 Polymyositis associated with autoimmune disease  M33.20 710.4    M35.9 279.49   3. Mixed connective tissue disease  M35.1 710.8   4. ILD (interstitial lung disease)  J84.9 515   5. Pharyngeal dysphagia  R13.13 787.23   6. Dysphonia  R49.0 784.42   7. Elevated CK  R74.8 790.5   8. High risk medication use  Z79.899 V58.69   9. On prednisone therapy  Z79.52 V58.65          Plan:     72-year-old pleasant -American woman with history of lupus, rheumatoid arthritis, mixed connective tissue disease, interstitial lung disease, vocal cord dysfunction, dysphagia here for establishment of care of antisynthetase syndrome.     1. Antisynthetase syndrome:Positive myositis antibody, EJ 78, high titer, PL7 is 12, Ml-2 is 17. Positive myositis panel, questionable Raynaud's phenomenon, dysphagia, dysphonia. Intermittent elevation of CPK, had flare in 8/2022. Discussed the disease course and treatment options of antisynthetase  syndrome. Positive SSA ab associated with ILD and ATS, poor prognosis.  - Started IVIG infusions as she has hoarseness of voice and dysphagia with elevated CPK. Recieved first dose of IVIG 2 g/kg, 130 mg total dose on 9/6/22. Tolerated IVIG well. She had received few more doses and stopped in 12/2022. She does not like to take infusions and will consider in the future if she flares again, refusing to take it currently.  - C/w prednisone to 7.5 mg daily, will decrease in next visit if CT chest is stable.   - C/w CellCept 1500 mg twice daily.   - Labs today.  - Need to repeat CT chest. PFT's stable in 2/2023.     2. Color changes in hands: Does not have typical color changes of Raynaud's phenomenon, saw pictures in her phone, hands turn dark when its cold. Few dilated capillary nail folds, no dropouts on exam.      3. ILD (interstitial lung disease): Interstitial lung disease: DLCO low, 22% in October 2021. CT chest showed bibasilar reticulonodular changes and mild honeycombing in March 2022. Admits chronic cough, SOB with exertion. She was referred to pulmonary, she had seen Dr Llanes. Continue close follow-up with Dr. Llanes. OFEV was discussed but never started it, due to swallowing issues.   - Requested records.      4. Dysphonia/dysphagia: Dysphonia is stable. Dysphagia is getting worse. Dysphagia and dysphonia secondary to antisynthetase syndrome. Swallow evaluation showed severe oropharyngeal dysphagia. Recommend ENT and neurological evaluation. She had seen neurologist Dr Francine Goss and ENT Dr Miranda.   - Advised to follow up with ENT, patient will make follow up.      5. Mixed connective tissue disease: She was treated with lmuran, methotrexate and Plaquenil in the past. Positive ISMAEL, nuclear, cytoplasmic and homogeneous pattern, 1:320. Positive RNP antibody, 4.6. Positive SSA, 2.7. Positive ISMAEL and SSA antibody likely associated with antisynthetase syndrome. She has features of mixed connective tissue  disease and antisynthetase syndrome.   - Check complements and UA every 6 months.      7. Heart murmur, history of heart issues: Requested and reviewed records from cardiology Dr Warren. Echocardiogram in September 2021 showed diastolic dysfunction, normal systolic function and elevated PASP 41 mmHg. Continue close follow-up with cardiology and pulmonary.   - C/o palpitations for last 2 months, advised to discuss with Dr Rebollar, her PCP.      8. High risk medication use: Persons with rheumatoid arthritis, lupus, psoriatic arthritis and other autoimmune diseases are at increased risk of cardiovascular disease including heart attack and stroke. We recommend that all patients with these conditions have annual health maintenance exams including lipid measurements, blood pressure measurements, and smoking cessation counseling when applicable at their primary care provider's office.   - Advised to stay up-to-date on age appropriate vaccinations and malignancy screening, including yearly skin exams.       9. Bone health: Advised to take multivitamin and calcium supplementations. She had DXA done in 8/2021, Dr Rebollar monitoring it. Continue follow up with Dr Rebollar for screening of osteoporosis.         Follow up in about 3 months (around 6/27/2023). In addition to their scheduled follow up, the patient has also been instructed to follow up on as needed basis.      Total time spent with patient and documentation is more than 40 minutes. All questions were answered to patient's satisfaction and patient verbalized understanding.

## 2023-03-27 NOTE — TELEPHONE ENCOUNTER
Reviewed records from Pulmonary, looks like she had a CT chest on March 15, 2023, she did not need another CT chest.  I could not canceled the order because appointment was already scheduled.  Please call central scheduling and cancel her appointment and let the patient know that she does not need another CT chest.  Please request the results of the CT chest done on 3/15/23.

## 2023-03-27 NOTE — TELEPHONE ENCOUNTER
PT notified that we cancelled CT chest per Dr. Manzanares's request.  CT results requested from [facility].  Will be send by fax.  Will call PCP for echo.

## 2023-04-11 ENCOUNTER — PATIENT MESSAGE (OUTPATIENT)
Dept: RESEARCH | Facility: HOSPITAL | Age: 73
End: 2023-04-11
Payer: MEDICARE

## 2023-05-09 ENCOUNTER — TELEPHONE (OUTPATIENT)
Dept: RHEUMATOLOGY | Facility: CLINIC | Age: 73
End: 2023-05-09
Payer: MEDICARE

## 2023-05-09 NOTE — TELEPHONE ENCOUNTER
Reviewed the labs done by her PCP last week, muscle enzymes are elevated and also WBC count is elevated.  Ask her how she is feeling, if she has any infection going on.  Ask her if she is taking CellCept 7.5 mL twice a day or not.

## 2023-05-16 ENCOUNTER — PATIENT MESSAGE (OUTPATIENT)
Dept: RESEARCH | Facility: HOSPITAL | Age: 73
End: 2023-05-16
Payer: MEDICARE

## 2023-06-26 ENCOUNTER — OFFICE VISIT (OUTPATIENT)
Dept: GASTROENTEROLOGY | Facility: CLINIC | Age: 73
End: 2023-06-26
Payer: MEDICARE

## 2023-06-26 VITALS
BODY MASS INDEX: 26.16 KG/M2 | HEIGHT: 65 IN | DIASTOLIC BLOOD PRESSURE: 62 MMHG | WEIGHT: 157 LBS | HEART RATE: 77 BPM | SYSTOLIC BLOOD PRESSURE: 134 MMHG | OXYGEN SATURATION: 97 %

## 2023-06-26 DIAGNOSIS — G70.00 MYASTHENIA GRAVIS: ICD-10-CM

## 2023-06-26 DIAGNOSIS — K31.84 GASTROPARESIS: ICD-10-CM

## 2023-06-26 DIAGNOSIS — K21.9 GASTROESOPHAGEAL REFLUX DISEASE, UNSPECIFIED WHETHER ESOPHAGITIS PRESENT: ICD-10-CM

## 2023-06-26 DIAGNOSIS — R74.8 ELEVATED LIVER ENZYMES: ICD-10-CM

## 2023-06-26 DIAGNOSIS — D72.829 LEUKOCYTOSIS, UNSPECIFIED TYPE: ICD-10-CM

## 2023-06-26 DIAGNOSIS — R13.10 DYSPHAGIA, UNSPECIFIED TYPE: ICD-10-CM

## 2023-06-26 DIAGNOSIS — D64.9 ANEMIA, UNSPECIFIED TYPE: ICD-10-CM

## 2023-06-26 DIAGNOSIS — R13.19 ESOPHAGEAL DYSPHAGIA: Primary | ICD-10-CM

## 2023-06-26 PROCEDURE — 99215 OFFICE O/P EST HI 40 MIN: CPT | Mod: S$GLB,,, | Performed by: INTERNAL MEDICINE

## 2023-06-26 PROCEDURE — 99215 PR OFFICE/OUTPT VISIT, EST, LEVL V, 40-54 MIN: ICD-10-PCS | Mod: S$GLB,,, | Performed by: INTERNAL MEDICINE

## 2023-06-26 RX ORDER — AMLODIPINE BESYLATE 5 MG/1
5 TABLET ORAL DAILY
COMMUNITY
End: 2023-12-12

## 2023-06-26 RX ORDER — FOLIC ACID 1 MG/1
1 TABLET ORAL DAILY
COMMUNITY

## 2023-06-26 RX ORDER — ALBUTEROL SULFATE 90 UG/1
AEROSOL, METERED RESPIRATORY (INHALATION)
COMMUNITY
Start: 2022-08-04 | End: 2023-07-25

## 2023-06-26 RX ORDER — METOPROLOL TARTRATE 25 MG/1
TABLET, FILM COATED ORAL
COMMUNITY
Start: 2023-06-20

## 2023-06-26 RX ORDER — LORATADINE 10 MG/1
10 TABLET ORAL
COMMUNITY
Start: 2022-11-15 | End: 2023-07-25 | Stop reason: ALTCHOICE

## 2023-06-26 RX ORDER — IPRATROPIUM BROMIDE AND ALBUTEROL SULFATE 2.5; .5 MG/3ML; MG/3ML
3 SOLUTION RESPIRATORY (INHALATION)
COMMUNITY
End: 2023-07-25

## 2023-06-26 RX ORDER — POTASSIUM CHLORIDE 750 MG/1
TABLET, EXTENDED RELEASE ORAL
COMMUNITY
Start: 2023-05-10 | End: 2023-07-25 | Stop reason: ALTCHOICE

## 2023-06-26 RX ORDER — SODIUM CHLORIDE FOR INHALATION 3 %
4 VIAL, NEBULIZER (ML) INHALATION
COMMUNITY
Start: 2022-08-04 | End: 2023-07-25

## 2023-06-26 RX ORDER — MYCOPHENOLATE MOFETIL 200 MG/ML
7.5 POWDER, FOR SUSPENSION ORAL 2 TIMES DAILY
COMMUNITY
Start: 2023-03-27 | End: 2023-07-25 | Stop reason: SDUPTHER

## 2023-06-26 NOTE — PROGRESS NOTES
Clinic Note    Reason for visit:  The primary encounter diagnosis was Esophageal dysphagia. Diagnoses of Gastroesophageal reflux disease, unspecified whether esophagitis present, Gastroparesis, Myasthenia gravis, Dysphagia, unspecified type, Elevated liver enzymes, Leukocytosis, unspecified type, and Anemia, unspecified type were also pertinent to this visit.    PCP: Ariana Rebollar       HPI:  This is a 72 y.o. female who is here for a follow up. Patient takes pantoprazole 20 BID which controls her reflux well. Her dysphagia has worsened this year. Having dysphagia daily with foods occasionally getting lodged in her esophagus. She has to drink a lot of water for food to eventually go down. Will have choking episode with this as well. Has episodes with food coming out of her nose when choking. Denies weight loss. Was in the hospital 4/2023 due to COVID, rhabdo, myasthenia gravis- gained weight due to fluid retention. Has had constipation for past week. Having Bm daily but will have hard stool. Denies rectal bleeding. Was recently diagnosed with myasthenia gravis by her rheumatologist. No recent swallow test. Had MBS in 2019.    Rheumatology note 3/27/2023: Swallow evaluation showed severe oropharyngeal dysphagia. Recommend ENT and neurological evaluation. She had seen neurologist Dr Francine Goss and ENT Dr Miranda.   She had a scope with Dr. Miranda 4/2023- was told she had a lot of mucus in her esophagus. He did not dilate.    4/23/2023 (During hospital admission) WBC 22H H/H 10.7L/HCT 35L MCV 82, BMP NL. CK 21,881H    Colonoscopy 10/11/2022: Long redundant colon. IH. Normal otherwise. Repeat colonoscopy in 10 years.    EM 6/5/2018: slight increase in resting UES pressure but normal residual pressure  GES 3/2/2018: Delayed gastric emptying at 2H and 4H c/w gastroparesis  Last EGD 2/15/2018: EBx nl. Order EM                Dysphagia, pharyngoesophageal phase: already on therapy for MCTD which is potentially  contributing to some decreased esophageal clearance, has small HH, and potentially skeletal muscle involvement of her MCTD, improved with pantoprazole 20mg PO BID, EM increased basal UESP and decreased lower esophageal clearance. HOB frame elevated 8 inches. Continue PPI for now and will give BID eRx and may take 1-2 times daily. Goal of no more weight loss. TLC is helping with excessive chewing and fluid intake. Tried Ensure/Boost equivalent one a day but cannot stand the taste. Offered empiric Savary dilation but unclear if will help her dysphagia/hoarseness. Wishes to hold for now; she may contact us if she would like to scheduled. May contact us with any issues.   GERD: EGD 2018 with small HH, likely caused by MCTD for which they are trying to get her off prednisone. Started on AZA about 2014. Continue PPI, elevated HOB in case non-acid reflux is leading to hoarseness.  Mixed connective tissue disease: continue meds as is per rheumatology (main issue)      Review of Systems   Constitutional:  Positive for diaphoresis, fatigue and unexpected weight change. Negative for fever.   HENT:  Positive for trouble swallowing. Negative for mouth sores, postnasal drip and sore throat.    Eyes:  Negative for pain, discharge and eye dryness.   Respiratory:  Positive for cough, choking and shortness of breath. Negative for apnea, chest tightness and wheezing.    Cardiovascular:  Negative for chest pain, palpitations and leg swelling.   Gastrointestinal:  Positive for abdominal distention. Negative for abdominal pain, anal bleeding, blood in stool, change in bowel habit, constipation, diarrhea, nausea, rectal pain, vomiting, reflux, fecal incontinence and change in bowel habit.   Genitourinary:  Negative for bladder incontinence, dysuria and hematuria.   Musculoskeletal:  Negative for arthralgias, back pain and joint swelling.   Integumentary:  Negative for color change and rash.   Allergic/Immunologic: Negative for  environmental allergies and food allergies.   Neurological:  Negative for seizures and headaches.   Hematological:  Negative for adenopathy. Does not bruise/bleed easily.      Past Medical History:   Diagnosis Date    Allergy     Aortic regurgitation     Arthritis     BMI 22.0-22.9, adult     Deviated nasal septum     Diabetes mellitus     Dysphagia     GERD (gastroesophageal reflux disease)     Hypertension     PAOLA (obstructive sleep apnea)     Pulmonary fibrosis     Pulmonary hypertension     Pulmonic regurgitation     Restrictive lung disease     Sinus bradycardia     Valvular heart disease      Past Surgical History:   Procedure Laterality Date    APPENDECTOMY      CARDIAC CATHETERIZATION  2017     SECTION      COLONOSCOPY  2011    ESOPHAGEAL MANOMETRY  2018    EYE SURGERY  2021    Cataract surgery    GASTROSCOPY  02/15/2018    HYSTERECTOMY  2003    Vocal Cord Surgery  2017    cyst removed     Family History   Problem Relation Age of Onset    Cancer Mother     Diabetes Mellitus Father     Diabetes Father     Vision loss Father     Cancer Sister      Social History     Tobacco Use    Smoking status: Never    Smokeless tobacco: Never   Substance Use Topics    Alcohol use: No    Drug use: No     Review of patient's allergies indicates:   Allergen Reactions    Codeine Other (See Comments)     Becomes weak and over heated.   Other reaction(s): unknown  Hot flashes        Medication List with Changes/Refills   Current Medications    ALBUTEROL (PROVENTIL/VENTOLIN HFA) 90 MCG/ACTUATION INHALER        ALBUTEROL-IPRATROPIUM (DUO-NEB) 2.5 MG-0.5 MG/3 ML NEBULIZER SOLUTION    Inhale 3 mLs into the lungs.    AMLODIPINE (NORVASC) 5 MG TABLET    Take 5 mg by mouth.    CYCLOSPORINE (RESTASIS) 0.05 % OPHTHALMIC EMULSION    Place into both eyes.    FOLIC ACID (FOLVITE) 1 MG TABLET        HYPROMELLOSE (SYSTANE GEL OPHT)    Apply 1 drop to eye 3 (three) times daily. I gel drop to each eye TID     "IPRATROPIUM (ATROVENT) 42 MCG (0.06 %) NASAL SPRAY    INSTILL 2 SPRAYS INTO EACH NOSTRIL TWO TIMES A DAY    LORATADINE (CLARITIN) 10 MG TABLET    Take 10 mg by mouth.    METOPROLOL TARTRATE (LOPRESSOR) 25 MG TABLET    TAKE ONE TABLET BY MOUTH TWO TIMES A DAY DISCONTINUE COREG    MYCOPHENOLATE MOFETIL (CELLCEPT) 200 MG/ML SUSR    Take 7.5 mg by mouth.    PANTOPRAZOLE (PROTONIX) 40 MG TABLET    Take 40 mg by mouth 2 (two) times daily.    POTASSIUM CHLORIDE SA (K-DUR,KLOR-CON M) 10 MEQ TABLET    TAKE ONE TABLET BY MOUTH ONCE DAILY FOR 7 DAYS    PREDNISOLONE ACETATE (PRED FORTE) 1 % DRPS    Place 1 drop into both eyes 2 (two) times daily.    SODIUM CHLORIDE 3% 3 % NEBULIZER SOLUTION    Inhale 4 mLs into the lungs.   Discontinued Medications    ALBUTEROL (PROVENTIL/VENTOLIN HFA) 90 MCG/ACTUATION INHALER    1 puff as needed.    ALBUTEROL-IPRATROPIUM (DUO-NEB) 2.5 MG-0.5 MG/3 ML NEBULIZER SOLUTION    3 mL as needed    AMLODIPINE (NORVASC) 10 MG TABLET    Take 10 mg by mouth.    AMLODIPINE (NORVASC) 5 MG TABLET    Take 5 mg by mouth once daily.    CARVEDILOL (COREG) 3.125 MG TABLET    Take 3.125 mg by mouth 2 (two) times daily with meals.    CEFUROXIME (CEFTIN) 500 MG TABLET    TAKE ONE TABLET BY MOUTH TWO TIMES DAY FOR 7 DAYS    FOLIC ACID (FOLVITE) 1 MG TABLET    Take 1,000 mcg by mouth.    FOLIC ACID (FOLVITE) 800 MCG TAB    Take 800 mcg by mouth once daily.    LORATADINE (CLARITIN) 10 MG TABLET    Take by mouth.    MYCOPHENOLATE MOFETIL (CELLCEPT) 200 MG/ML SUSR    Take 7.5 mLs (1,500 mg total) by mouth 2 (two) times a day.    NINJACOF 12.5-12.5 MG/5 ML LIQD    Take 10 mLs by mouth 3 (three) times daily.    PRIVIGEN 10 % SOLN    Inject into the vein.    SODIUM CHLORIDE 3% 3 % NEBULIZER SOLUTION    4 ml         Vital Signs:  /62   Pulse 77   Ht 5' 5" (1.651 m)   Wt 71.2 kg (157 lb)   SpO2 97%   BMI 26.13 kg/m²         Physical Exam  Vitals reviewed.   Constitutional:       General: She is awake. She is not in " acute distress.     Appearance: Normal appearance. She is well-developed. She is not ill-appearing, toxic-appearing or diaphoretic.      Comments: Hoarseness of voice at baseline.   HENT:      Head: Normocephalic and atraumatic.      Comments: Small mouth opening     Nose: Nose normal.      Mouth/Throat:      Mouth: Mucous membranes are moist.      Pharynx: Oropharynx is clear. No oropharyngeal exudate or posterior oropharyngeal erythema.   Eyes:      General: Lids are normal. Gaze aligned appropriately. No scleral icterus.        Right eye: No discharge.         Left eye: No discharge.      Conjunctiva/sclera: Conjunctivae normal.   Neck:      Trachea: Trachea normal.   Cardiovascular:      Rate and Rhythm: Normal rate and regular rhythm.      Pulses:           Radial pulses are 2+ on the right side and 2+ on the left side.   Pulmonary:      Effort: Pulmonary effort is normal. No respiratory distress.      Breath sounds: No stridor. No wheezing.   Chest:      Chest wall: No tenderness.   Abdominal:      General: Bowel sounds are normal. There is no distension.      Palpations: Abdomen is soft. There is no fluid wave, hepatomegaly or mass.      Tenderness: There is no abdominal tenderness. There is no guarding or rebound.   Musculoskeletal:         General: No tenderness or deformity.      Cervical back: Full passive range of motion without pain and neck supple. No tenderness.      Right lower leg: No edema.      Left lower leg: No edema.   Lymphadenopathy:      Cervical: No cervical adenopathy.   Skin:     General: Skin is warm and dry.      Capillary Refill: Capillary refill takes less than 2 seconds.      Coloration: Skin is not cyanotic, jaundiced or pale.   Neurological:      General: No focal deficit present.      Mental Status: She is alert and oriented to person, place, and time.      Motor: No tremor.   Psychiatric:         Attention and Perception: Attention normal.         Mood and Affect: Mood and affect  normal.         Speech: Speech normal.         Behavior: Behavior normal. Behavior is cooperative.          All of the data above and below has been reviewed by myself and any further interpretations will be reflected in the assessment and plan.   The data includes review of external notes, and independent interpretation of lab results, procedures, x-rays, and imaging reports.      Assessment:  Esophageal dysphagia  -     Fl Modified Barium Swallow Speech; Future; Expected date: 06/26/2023    Gastroesophageal reflux disease, unspecified whether esophagitis present    Gastroparesis    Myasthenia gravis    Dysphagia, unspecified type  -     Fl Modified Barium Swallow Speech; Future; Expected date: 06/26/2023    Elevated liver enzymes  Comments:  related to COVID19 episode? repeat LFT  Orders:  -     Comprehensive Metabolic Panel; Future; Expected date: 06/26/2023    Leukocytosis, unspecified type  -     CBC Auto Differential; Future; Expected date: 06/26/2023    Anemia, unspecified type  -     CBC Auto Differential; Future; Expected date: 06/26/2023    Discussed with patient repeat swallow tests to evaluate for stricture due to increase in dysphagia.  However, stricture seems less likely in dysmotility of the esophagus and remainder GI tract is more likely.  Her dysphagia is likely due to her MCTD, myasthenia gravis. Empiric dilation may not help. Continue w/ panto 20mg BID.  She has been able to maintain her weight for the most part.     Recommendations:    Schedule modified barium swallow study.  Submit blood samples.      Risks, benefits, and alternatives of medical management, any associated procedures, and/or treatment discussed with the patient. Patient given opportunity to ask questions and voices understanding. Patient has elected to proceed with the recommended care modalities as discussed.    Instructed patient to notify my office if they have not been contacted within two weeks after any procedures,  submitting any samples (biopsies, blood, stool, urine, etc.) or after any imaging (X-ray, CT, MRI, etc.).     Follow up in about 6 months (around 12/26/2023).    Order summary:  Orders Placed This Encounter   Procedures    Fl Modified Barium Swallow Speech    CBC Auto Differential    Comprehensive Metabolic Panel          This document may have been created using a voice recognition transcribing system. Incorrect words or phrases may have been missed during proofreading. Please interpret accordingly or contact me for clarification.     Mindy Aguilar MD

## 2023-06-26 NOTE — LETTER
June 26, 2023        Ariana Grewal MD  [address] [place] [address] [address]  [address]  [place] [address]             [facility]  [address] [place] [address]  Phone: [phone]  Fax: [phone]   Patient: Devi Fernandes   MR Number: [id number]   YOB: 1950   Date of Visit: 6/26/2023       Dear Dr. Grewal:    Thank you for referring Devi Fernandes to me for evaluation. Attached you will find relevant portions of my assessment and plan of care.    If you have questions, please do not hesitate to call me. I look forward to following Devi Fernandes along with you.    Sincerely,      Mindy Aguilar MD            CC  No Recipients    Enclosure

## 2023-06-30 ENCOUNTER — TELEPHONE (OUTPATIENT)
Dept: GASTROENTEROLOGY | Facility: CLINIC | Age: 73
End: 2023-06-30
Payer: MEDICARE

## 2023-06-30 NOTE — TELEPHONE ENCOUNTER
Pt takes pantoprazole 20 mg BID. She called reporting that she needs a Rx for this. She states she had on hand 40mg panto which she is now out of. OV notes says to continue panto 20 BID. Please send Rx.

## 2023-07-05 DIAGNOSIS — K21.9 GASTROESOPHAGEAL REFLUX DISEASE, UNSPECIFIED WHETHER ESOPHAGITIS PRESENT: Primary | ICD-10-CM

## 2023-07-05 RX ORDER — PANTOPRAZOLE SODIUM 20 MG/1
20 TABLET, DELAYED RELEASE ORAL 2 TIMES DAILY
Qty: 180 TABLET | Refills: 3 | Status: SHIPPED | OUTPATIENT
Start: 2023-07-05

## 2023-07-05 NOTE — TELEPHONE ENCOUNTER
----- Message from Neeru Schultz sent at 7/5/2023 10:39 AM CDT -----  Contact: self  Type:  RX Refill Request  Who Called: Devi Fernandes  Refill or New Rx:refill  RX Name and Strength:pantoprazole (PROTONIX) 40 MG tablet   8/8/2022   Sig: Take 40 mg by mouth 2 (two) times daily.  Class: Historical Med  Route: Oral  How is the patient currently taking it? (ex. 1XDay):2x's a day  Is this a 30 day or 90 day RX:30  Preferred Pharmacy with phone number:  [address] [address]  Phone: [phone] Fax: [phone]  Local or Mail Order:local  Ordering Provider:Mindy Aguilar  Would the patient rather a call back or a response via [facility]? call  Best Call Back Number:[phone]    Additional Information: na

## 2023-07-25 ENCOUNTER — OFFICE VISIT (OUTPATIENT)
Dept: RHEUMATOLOGY | Facility: CLINIC | Age: 73
End: 2023-07-25
Payer: MEDICARE

## 2023-07-25 VITALS
WEIGHT: 156.19 LBS | HEIGHT: 65 IN | SYSTOLIC BLOOD PRESSURE: 143 MMHG | TEMPERATURE: 98 F | BODY MASS INDEX: 26.02 KG/M2 | RESPIRATION RATE: 20 BRPM | DIASTOLIC BLOOD PRESSURE: 71 MMHG | HEART RATE: 70 BPM | OXYGEN SATURATION: 100 %

## 2023-07-25 DIAGNOSIS — R74.8 ELEVATED CK: ICD-10-CM

## 2023-07-25 DIAGNOSIS — J84.9 ILD (INTERSTITIAL LUNG DISEASE): ICD-10-CM

## 2023-07-25 DIAGNOSIS — M35.1 MIXED CONNECTIVE TISSUE DISEASE: ICD-10-CM

## 2023-07-25 DIAGNOSIS — R49.0 DYSPHONIA: ICD-10-CM

## 2023-07-25 DIAGNOSIS — D89.89 ANTISYNTHETASE SYNDROME: Primary | ICD-10-CM

## 2023-07-25 DIAGNOSIS — R13.13 PHARYNGEAL DYSPHAGIA: ICD-10-CM

## 2023-07-25 DIAGNOSIS — Z79.899 HIGH RISK MEDICATION USE: ICD-10-CM

## 2023-07-25 PROCEDURE — 99215 PR OFFICE/OUTPT VISIT, EST, LEVL V, 40-54 MIN: ICD-10-PCS | Mod: S$PBB,,, | Performed by: INTERNAL MEDICINE

## 2023-07-25 PROCEDURE — 99215 OFFICE O/P EST HI 40 MIN: CPT | Mod: PBBFAC | Performed by: INTERNAL MEDICINE

## 2023-07-25 PROCEDURE — 99215 OFFICE O/P EST HI 40 MIN: CPT | Mod: S$PBB,,, | Performed by: INTERNAL MEDICINE

## 2023-07-25 RX ORDER — MYCOPHENOLATE MOFETIL 200 MG/ML
1500 POWDER, FOR SUSPENSION ORAL 2 TIMES DAILY
Qty: 450 ML | Refills: 4 | Status: SHIPPED | OUTPATIENT
Start: 2023-07-25 | End: 2023-11-07 | Stop reason: SDUPTHER

## 2023-07-25 RX ORDER — PREDNISONE 5 MG/1
7.5 TABLET ORAL
COMMUNITY
Start: 2023-07-18 | End: 2023-07-25 | Stop reason: SDUPTHER

## 2023-07-25 RX ORDER — PREDNISONE 5 MG/1
7.5 TABLET ORAL DAILY
Qty: 45 TABLET | Refills: 4 | Status: SHIPPED | OUTPATIENT
Start: 2023-07-25 | End: 2023-08-24

## 2023-07-25 NOTE — PROGRESS NOTES
"  Patient ID: [id number]     Chief Complaint: Follow-up (Pt states she has been dealing with feeling of "locked jaw" states if she opens her mouth to wide its hard to close it. She also has been having pain to lower back cramp like feeling. Noted with swelling to left foot. )      HPI:     Devi Fernandes is a 72 y.o. female here today for establishment for anti synthetase syndrome.       She was diagnosed with lupus in 2008. She had seen rheumatologist in [place] in the past, does not remember the name. She was treated with prednisone. She was treated with some medications back then, she does not remember the name on medications. She may have been on lmuran, methotrexate and hydroxychloroquine in the past. Used higher dose of prednisone in the past. She was on prednisone daily in the past.     She was admitted at [facility] in [facility] in April 2023, shows admitted for rhabdomyolysis due to COVID, she was treated with steroids.  Plan to resume CellCept and prednisone after completion of Decadron therapy. Reviewed discharge summary.     She had fractured left 4th metatarsal in winter 2022, when she tripped while getting out of car wearing tight heels with straps.   She has dysphagia, cannot take tablets, she is  taking liquid CellCept, 1500 mg twice daily. Tolerating it well, restarted CellCept after discharge.  She was hospitalized for pneumonia in 8/2022, cough and shortness of breath is slightly better. Completed antibiotics and high dose steroids. Had flare of anti synthetase syndrome at that time, added IVIG.  She took IVIG for few months and stopped since November 2022, she does not like to get those infusions, she does not find them helpful(even though flare was improved with it, CPK normalized) she does not like to continue it and said she will consider it if she flares again.   Currently taking prednisone 7.5 mg daily.   Dr Llanes had discussed OFEV, she never started it because patient refused.  Had " seen neurology and ENT, Dr Miranda.   She started working in [employer] in 3/2022. C/o tiredness and fatigue form that.   She has joint pain in knees and other joints. Also has pain in fingers. No aggravating factors. Knee pain better with rest. Hard for her to open jars. Morning stiffness for 5 min. Denies red, warm and swollen joints.   Admits swallowing issues since . She has to swallow small pieces. Swallowing issues getting worse. She chokes on solids and liquids. Food does not get stuck in chest. Symptoms are stable.   Admits chronic shortness of breath, stable. Denies productive cough.   Admits color changes in hands for the last 15 years, since . Cold is trigger. She will have when its cold, could be twice month or every day if its cold. Episode lasts for 15 min. Never turnwhite, always purple in tips.   She had surgery for vocal cord polyps in  and since then she has dysphonia. Voice issues are same since , not getting worse. She follows with Dr Miranda.     Autoimmune diseases in family: none.   Denies fevers, oral or nasal ulcers, rashes, photosensitivity, history of DVT or PE, history of stroke or seizures, history of Ml, history of inflammatory eye diseases, history of malignancy.   Miscarriages: 2, first trimester miscarriages. Pregnancies: 3   Smoking: nonsmoker.   Denies recent hospitalizations.     Social History     Tobacco Use   Smoking Status Never   Smokeless Tobacco Never          ----------------------------  Allergy  Aortic regurgitation  Arthritis  BMI 22.0-22.9, adult  Deviated nasal septum  Diabetes mellitus  Dysphagia  GERD (gastroesophageal reflux disease)  Hypertension  PAOLA (obstructive sleep apnea)  Pulmonary fibrosis  Pulmonary hypertension  Pulmonic regurgitation  Restrictive lung disease  Sinus bradycardia  Valvular heart disease     Past Surgical History:   Procedure Laterality Date    APPENDECTOMY      CARDIAC CATHETERIZATION  2017     SECTION       COLONOSCOPY  11/18/2011    ESOPHAGEAL MANOMETRY  06/05/2018    EYE SURGERY  5/2021    Cataract surgery    GASTROSCOPY  02/15/2018    HYSTERECTOMY  2003    Vocal Cord Surgery  08/2017    cyst removed       Review of patient's allergies indicates:   Allergen Reactions    Codeine Other (See Comments)     Becomes weak and over heated.   Other reaction(s): unknown  Hot flashes       Outpatient Medications Marked as Taking for the 7/25/23 encounter (Office Visit) with Rad Manzanares MD   Medication Sig Dispense Refill    amLODIPine (NORVASC) 5 MG tablet Take 5 mg by mouth once daily.      folic acid (FOLVITE) 1 MG tablet Take 1 mg by mouth once daily.      hypromellose (SYSTANE GEL OPHT) Apply 1 drop to eye 3 (three) times daily. I gel drop to each eye TID      ipratropium (ATROVENT) 42 mcg (0.06 %) nasal spray 2 sprays 2 (two) times daily as needed.      metoprolol tartrate (LOPRESSOR) 25 MG tablet TAKE ONE TABLET BY MOUTH TWO TIMES A DAY DISCONTINUE COREG      pantoprazole (PROTONIX) 20 MG tablet Take 1 tablet (20 mg total) by mouth 2 (two) times daily. Take 20 mg by mouth 2 (two) times daily. 180 tablet 3    prednisoLONE acetate (PRED FORTE) 1 % DrpS Place 1 drop into both eyes 2 (two) times daily.      propylene glycol (SYSTANE BALANCE OPHT) Apply 1 drop to eye daily as needed.      [DISCONTINUED] mycophenolate mofetil (CELLCEPT) 200 mg/mL SusR Take 7.5 mg by mouth 2 (two) times a day.         Social History     Socioeconomic History    Marital status:    Tobacco Use    Smoking status: Never    Smokeless tobacco: Never   Substance and Sexual Activity    Alcohol use: No    Drug use: No    Sexual activity: Not Currently     Partners: Female     Birth control/protection: Abstinence        Family History   Problem Relation Age of Onset    Cancer Mother     Diabetes Mellitus Father     Diabetes Father     Vision loss Father     Cancer Sister         Immunization History   Administered  Date(s) Administered    Influenza (FLUAD) - Quadrivalent - Adjuvanted - PF *Preferred* (65+) 11/03/2022    Influenza (FLUAD) - Trivalent - Adjuvanted - PF (65+) 10/10/2018    Influenza - High Dose - PF (65 years and older) 12/04/2017, 09/17/2019, 09/30/2020, 10/21/2021    Influenza - Trivalent (ADULT) 09/30/2020    Pneumococcal Conjugate - 13 Valent 02/06/2019    Pneumococcal Polysaccharide - 23 Valent 11/03/2022    Td (Adult), Unspecified Formulation 10/21/2020    Tdap 02/06/2019    Zoster Recombinant 11/03/2022, 05/09/2023       Patient Care Team:  Ariana Grewal MD as PCP - General (Internal Medicine)  Mindy Aguilar MD as Consulting Physician (Gastroenterology)     Subjective:       Constitutional:  Denies chills. Denies fever. Denies night sweats. Denies weight loss.   Ophthalmology: Denies blurred vision. Denies dry eyes. Denies eye pain. Denies Itching and redness.   ENT: Denies oral ulcers. Denies epistaxis. Denies dry mouth. Denies swollen glands.   Endocrine: Denies diabetes. Denies thyroid Problems.   Respiratory: Denies cough. Denies shortness of breath. Denies shortness of breath with exertion. Denies hemoptysis.   Cardiovascular: Denies chest pain at rest. Denies chest pain with exertion. Admits palpitations.    Gastrointestinal: Denies abdominal pain. Denies diarrhea. Denies nausea. Denies vomiting. Denies hematemesis or hematochezia. Denies heartburn.  Genitourinary: Denies blood in urine.  Musculoskeletal: See HPI for details  Integumentary: Denies rash. Denies photosensitivity.   Peripheral Vascular: Denies Ulcers of hands and/or feet. Denies Cold extremities.   Neurologic: Denies dizziness. Denies headache.  Denies loss of strength. Denies numbness or tingling.   Psychiatric: Denies depression. Denies anxiety. Denies suicidal/homicidal ideations.      Objective:     BP (!) 143/71 (BP Location: Left arm, Patient Position: Sitting, BP Method: Large (Automatic))   Pulse 70   Temp 97.7  "°F (36.5 °C) (Oral)   Resp 20   Ht 5' 5" (1.651 m)   Wt 70.9 kg (156 lb 3.2 oz)   SpO2 100%   BMI 25.99 kg/m²     Physical Exam    General Appearance: alert, pleasant, in no acute distress.  Skin: Skin color, texture, turgor normal. No rashes or lesions. Few capillary dilatations, no drop outs.   Eyes:  extraocular movement intact (EOMI), pupils equal, round, reactive to light and accommodation, conjunctiva clear.  ENT: No oral or nasal ulcers.  Neck:  Neck supple. No adenopathy.   Lungs: fine crackles in bases, clear through out.   Heart: RRR w/o murmurs.  No edema. Soft systolic murmur present.   Abdomen: Soft, non-tender, no masses, rebound or guarding.  Neuro: Alert, oriented, CN II-XII GI, sensory and motor innervation intact.  Psych: Alert, oriented, normal eye contact.    Joint Exam:  DIPs, PIPs, MCPs: no pain on palpation, no swelling or redness, no nodules. DJD of bilateral hands.   Wrists: no pain on palpation, no swelling or redness, good range of motion.  Elbows: no pain on palpation, no swelling or redness, good range of motion, no nodules.  Feet: no pain on palpation, no swelling or redness, no nodules.    Labs:     August 2021: CMP okay. CBC okay. SPEP showed polyclonal gammopathy and elevated beta proteins. 2/18/2022 :ESR, CRP normal. CBC okay. CMP okay. Globulin and protein slightly high. CPK normal.  mg/g. Rheumatoid factor and anti CCP negative. Positive RNP antibody, 4.6. Positive SSA, 2.7. Negative SCL 70, SSB, Smith, dsDNA, thyroglobulin, TPO, RNA polymerase 3, centromere antibodies. Aldolase normal. ANCA, MPO and IN-3 negative. Cardiolipin, beta-2 glycoprotein negative. LAC negative. Positive ISMAEL, nuclear, cytoplasmic and homogeneous pattern, 1:320. Positive myositis antibody, EJ 78, high titer, PL? is 12, Ml-2 is 17. C3, C4 normal. No blood and protein in urine.   3/24/22: Hep B, C, HIV, quantiferon tb negative.   5/3/2022 ROW elevated 15.0. CBC okay. Glucose 129. CMP okay. CPK " and aldolase normal. ESR 24, CRP normal. TPMT low, low metaboilizer.   6/10/2022: CMP okay. LFTs normal. CPK and aldoalse normal. Hemoglobin 11.8. ESR 45, normal less than 30. CRP normal. No blood and protein in urine. C3, C4 okay.  mg/g.   2022- during hospitalization: AST elevated 72, otherwise CMP okay. CPK greater than 2000. Elevated WBC count, otherwise CBC okay. Small protein and no blood in urine.  2022: C3, C4 okay. Urine protein less than 15 mg/dL. No blood and protein in urine. Small leukocyte esterase. WBC count elevated secondary to steroids. WBC count 13.4, hemoglobin 11.5. Bicarb slightly elevated. CMP okay otherwise. CPK elevated 2076. ESR 40, CRP normal. Aldolase 28.   23: CMP ok. ESR 55, CRP normal. Hg 11.7. C3, C4 normal. CPK and aldolase normal.  3/27/23:  UA, upc okay.  CMP okay.  ESR 32.  WBC count 11.8.  CBC okay otherwise.  C3, C4 okay.  CRP normal.  Aldolase and CPK normal.  23:  WBC count elevated 15.3.  Hemoglobin 11.0.  AST 49, ALC 76, upper limits of normal.  Calcium 8.3.  GFR normal.  CPK elevated 1096.  BNP normal.  No protein and blood in urine.  Labs done by her PCP, Dr. Rebollar.      Imagin2021: CT neck soft tissue: no soft tissue abnormalities. Of cervical spine. No enlarged lymph nodes. Thyroid normal.   Retropharyngeal space unremarkable. Normal larynx.   Swallowing study: 10/2021: No penetration or aspiration. Speech pathology evaluation showed moderate to severe oropharyngeal dysphagia, lingual weakness, reduced range of motion and coordination, pharyngeal delay, refused hypo laryngeal elevation, reduced epiglottic inversion and pharyngeal squeeze. Recommend softer fine chopped meats thin liquids, no straws. Crushed meds in puree consistency. Aspiration precautions. ENT and neurological evaluation is recommended.     22: Chest x-ray showed coarse reticular opacities in the mid and lower lungs, unchanged from 2021. Calcaneal  spurring bilaterally, no erosions. Normal x-ray of bilateral knees. Normal x ray of bilateral hands, no erosions.     Echocardiogram 9/8/2021: Concentric left ventricular hypertrophy. Diastolic dysfunction. Mild aortic insufficiency. Moderate pulmonary hypertension. EF 55 to 60%. PASP 41 mmHg.   03/15/2023:  Echocardiogram 08/30/2022 showed normal systolic function, EF 60-65%, grade 1 diastolic dysfunction, PASP 34 mmHg.     3/16/2022 :CT chest showed old granulomatous disease. Predominantly bibasilar interstitial pulmonary fibrosis. Minimal peripheraI honeycombing. Extensive reticuIonoduIar Iung changes and small calcified lung nodules. Bronchiectasis present. Slightly enlarged mediastinal lymph nodes.   CT chest on 03/15/2023 showed stable pulmonary fibrosis with peripheral honeycombing, bronchiectasis, wall granulomatous disease, stable right lateral chest subpleural dense opacity associated with posterior fissures.  Fine reticular lung markings with ground-glass opacity and ground-glass density.  Stable small right upper lobes pleural nodule.      10/28/2021: PFTs: FVC 71%, FEV1 83%, ratio 91%, total lung capacity 77%, DLCO 22%, DLCO/VA 44%.  2/14/23: PFTs: FVC 75%, FEV1 84%, ratio 86, total lung capacity 78%, DLCO 26%, DLCO/VA 52%.    Previous rheumatology records from [place]: Dr. Mynor Delarosa: Clinic visit in October 2020: Diagnosed with lupus, pulmonary fibrosis, pulmonary hypertension. She was treated with methotrexate and Plaquenil in the past. Patient has stable and ongoing shortness of PFTs showed markedly low DLCO. Off of her medications currently. Labs unremarkable. Need to order high resolution. Nonproductive cough. Muscle strength normal. Chest x ray October 2020. Dense reticulonodular fibrosis at the lung bases.       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 Mixed connective tissue disease  M35.1 710.8   3. ILD (interstitial lung disease)  J84.9 515    4. Pharyngeal dysphagia  R13.13 787.23   5. Dysphonia  R49.0 784.42   6. Elevated CK  R74.8 790.5   7. High risk medication use  Z79.899 V58.69            Plan:     72-year-old pleasant -American woman with history of lupus, rheumatoid arthritis, mixed connective tissue disease, interstitial lung disease, vocal cord dysfunction, dysphagia here for establishment of care of antisynthetase syndrome.     1. Antisynthetase syndrome:Positive myositis antibody, EJ 78, high titer, PL7 is 12, Ml-2 is 17. Positive myositis panel, questionable Raynaud's phenomenon, dysphagia, dysphonia. Intermittent elevation of CPK, had flare in 8/2022. Discussed the disease course and treatment options of antisynthetase syndrome. Positive SSA ab associated with ILD and ATS, poor prognosis.  - Started IVIG infusions as she has hoarseness of voice and dysphagia with elevated CPK. Recieved first dose of IVIG 2 g/kg, 130 mg total dose on 9/6/22. Tolerated IVIG well. She had received few more doses and stopped in 12/2022. She does not like to take infusions and will consider in the future if she flares again, refusing to take it currently.  - C/w prednisone to 7.5 mg daily, will decrease in next visit if muscle enzymes are stable.  - C/w CellCept 1500 mg twice daily, 7.5 ml BID.    - Labs today.  - PFT's stable in 2/2023, CT chest stable in 3/2023.     2. Color changes in hands: Does not have typical color changes of Raynaud's phenomenon, saw pictures in her phone, hands turn dark when its cold. Few dilated capillary nail folds, no dropouts on exam.      3. ILD (interstitial lung disease): Interstitial lung disease: DLCO low, 22% in October 2021. CT chest showed bibasilar reticulonodular changes and mild honeycombing in March 2022. Admits chronic cough, SOB with exertion. She was referred to pulmonary, she had seen Dr Llanes. Continue close follow-up with Dr. Llanes. OFEV was discussed but never started it, due to swallowing issues.   -  Requested and reviewed records from Pulmonary, clinic visit on 03/15/2023:  Echocardiogram 08/30/2022 showed normal systolic function, EF 60-65%, grade 1 diastolic dysfunction, PASP 34 mmHg.  CT chest on 03/15/2023 showed stable pulmonary fibrosis with peripheral honeycombing, bronchiectasis, wall granulomatous disease, stable right lateral chest subpleural dense opacity associated with posterior fissures.  Fine reticular lung markings with ground-glass opacity and ground-glass density.  Stable small right upper lobes pleural nodule.  Patient had denied Ofev and Perfenidone.  Recommend 2.5 L of oxygen at night.  Repeat CT chest in 1 year.  - Will continue to monitor.     4. Dysphonia/dysphagia: Dysphonia is stable. Dysphagia is getting worse. Dysphagia and dysphonia secondary to antisynthetase syndrome. Swallow evaluation showed severe oropharyngeal dysphagia. Recommend ENT and neurological evaluation. She had seen neurologist Dr Francine Goss and ENT Dr Miranda.   - Advised to follow up with ENT, patient will make follow up.      5. Mixed connective tissue disease: She was treated with lmuran, methotrexate and Plaquenil in the past. Positive ISMAEL, nuclear, cytoplasmic and homogeneous pattern, 1:320. Positive RNP antibody, 4.6. Positive SSA, 2.7. Positive ISMAEL and SSA antibody likely associated with antisynthetase syndrome. She has features of mixed connective tissue disease and antisynthetase syndrome.   - Check complements and UA every 6 months.      7. Heart murmur, history of heart issues: Requested and reviewed records from cardiology Dr Warren. Echocardiogram in September 2021 showed diastolic dysfunction, normal systolic function and elevated PASP 41 mmHg. Continue close follow-up with cardiology and pulmonary.      8. High risk medication use: Persons with rheumatoid arthritis, lupus, psoriatic arthritis and other autoimmune diseases are at increased risk of cardiovascular disease including heart attack and  stroke. We recommend that all patients with these conditions have annual health maintenance exams including lipid measurements, blood pressure measurements, and smoking cessation counseling when applicable at their primary care provider's office.   - Advised to stay up-to-date on age appropriate vaccinations and malignancy screening, including yearly skin exams.    - Reviewed uptodate on vaccines, zoster, pneumovax, reviewed.      9. Bone health: Advised to take multivitamin and calcium supplementations. She had DXA done in 8/2021, Dr Rebollar monitoring it. Continue follow up with Dr Rebollar for screening of osteoporosis.         Follow up in about 3 months (around 10/25/2023). In addition to their scheduled follow up, the patient has also been instructed to follow up on as needed basis.      Total time spent with patient and documentation is more than 40 minutes. All questions were answered to patient's satisfaction and patient verbalized understanding.

## 2023-07-31 ENCOUNTER — PATIENT MESSAGE (OUTPATIENT)
Dept: RESEARCH | Facility: HOSPITAL | Age: 73
End: 2023-07-31
Payer: MEDICARE

## 2023-08-01 ENCOUNTER — OUTSIDE PLACE OF SERVICE (OUTPATIENT)
Dept: INTERVENTIONAL RADIOLOGY/VASCULAR | Facility: CLINIC | Age: 73
End: 2023-08-01
Payer: MEDICARE

## 2023-08-01 PROCEDURE — 74230 PR XRAY, SWALLOW FUNCT, CINE/VIDEO, W/ SCOUT NECK RADIOGRAPH/IMG, W/CONTRAST: ICD-10-PCS | Mod: 26,,, | Performed by: RADIOLOGY

## 2023-08-01 PROCEDURE — 74230 X-RAY XM SWLNG FUNCJ C+: CPT | Mod: 26,,, | Performed by: RADIOLOGY

## 2023-09-07 ENCOUNTER — TELEPHONE (OUTPATIENT)
Dept: GASTROENTEROLOGY | Facility: CLINIC | Age: 73
End: 2023-09-07
Payer: MEDICARE

## 2023-09-07 NOTE — TELEPHONE ENCOUNTER
----- Message from Christy Taylor sent at 9/7/2023  3:16 PM CDT -----  Type:  Needs Medical Advice    Who Called: Marisela carrera/ Dr Rebollar's ofc   Symptoms (please be specific): -   How long has patient had these symptoms:  -  Pharmacy name and phone #:  -  Would the patient rather a call back or a response via [facility]?    Best Call Back Number: [phone]  Additional Information:  needs ofc notes from visit on 6/26/23 faxed to [phone]

## 2023-11-07 ENCOUNTER — TELEPHONE (OUTPATIENT)
Dept: RHEUMATOLOGY | Facility: CLINIC | Age: 73
End: 2023-11-07
Payer: MEDICARE

## 2023-11-07 ENCOUNTER — OFFICE VISIT (OUTPATIENT)
Dept: RHEUMATOLOGY | Facility: CLINIC | Age: 73
End: 2023-11-07
Payer: MEDICARE

## 2023-11-07 VITALS
OXYGEN SATURATION: 98 % | WEIGHT: 155 LBS | BODY MASS INDEX: 25.83 KG/M2 | RESPIRATION RATE: 18 BRPM | HEIGHT: 65 IN | TEMPERATURE: 99 F | HEART RATE: 92 BPM | SYSTOLIC BLOOD PRESSURE: 137 MMHG | DIASTOLIC BLOOD PRESSURE: 71 MMHG

## 2023-11-07 DIAGNOSIS — R49.0 DYSPHONIA: ICD-10-CM

## 2023-11-07 DIAGNOSIS — M35.1 MIXED CONNECTIVE TISSUE DISEASE: ICD-10-CM

## 2023-11-07 DIAGNOSIS — R74.8 ELEVATED CK: ICD-10-CM

## 2023-11-07 DIAGNOSIS — Z79.899 HIGH RISK MEDICATION USE: ICD-10-CM

## 2023-11-07 DIAGNOSIS — D89.89 ANTISYNTHETASE SYNDROME: Primary | ICD-10-CM

## 2023-11-07 DIAGNOSIS — J84.9 ILD (INTERSTITIAL LUNG DISEASE): ICD-10-CM

## 2023-11-07 DIAGNOSIS — R13.13 PHARYNGEAL DYSPHAGIA: ICD-10-CM

## 2023-11-07 PROCEDURE — 82550 ASSAY OF CK (CPK): CPT | Performed by: INTERNAL MEDICINE

## 2023-11-07 PROCEDURE — 99215 OFFICE O/P EST HI 40 MIN: CPT | Mod: S$PBB,,, | Performed by: INTERNAL MEDICINE

## 2023-11-07 PROCEDURE — 99213 OFFICE O/P EST LOW 20 MIN: CPT | Mod: PBBFAC | Performed by: INTERNAL MEDICINE

## 2023-11-07 PROCEDURE — 99215 PR OFFICE/OUTPT VISIT, EST, LEVL V, 40-54 MIN: ICD-10-PCS | Mod: S$PBB,,, | Performed by: INTERNAL MEDICINE

## 2023-11-07 RX ORDER — PREDNISONE 5 MG/1
5 TABLET ORAL DAILY
Qty: 30 TABLET | Refills: 3 | Status: SHIPPED | OUTPATIENT
Start: 2023-11-07 | End: 2023-12-07

## 2023-11-07 RX ORDER — PREDNISONE 5 MG/1
1.5 TABLET ORAL DAILY
COMMUNITY
Start: 2023-11-06 | End: 2023-11-07 | Stop reason: SDUPTHER

## 2023-11-07 RX ORDER — TRIAMCINOLONE ACETONIDE 55 UG/1
2 SPRAY, METERED NASAL DAILY
COMMUNITY

## 2023-11-07 RX ORDER — MYCOPHENOLATE MOFETIL 200 MG/ML
1500 POWDER, FOR SUSPENSION ORAL 2 TIMES DAILY
Qty: 1350 ML | Refills: 1 | Status: SHIPPED | OUTPATIENT
Start: 2023-11-07 | End: 2024-02-28 | Stop reason: SDUPTHER

## 2023-11-07 RX ORDER — ASCORBIC ACID 125 MG
1 TABLET,CHEWABLE ORAL DAILY
COMMUNITY

## 2023-11-07 RX ORDER — UBIDECARENONE 75 MG
500 CAPSULE ORAL DAILY
COMMUNITY
End: 2023-12-12

## 2023-11-07 NOTE — PROGRESS NOTES
Patient ID: [id number]     Chief Complaint: No chief complaint on file.      HPI:     Devi Fernandes is a 72 y.o. female here today for establishment for anti synthetase syndrome.       She was diagnosed with lupus in 2008. She had seen rheumatologist in [place] in the past, does not remember the name. She was treated with prednisone. She was treated with some medications back then, she does not remember the name on medications. She may have been on lmuran, methotrexate and hydroxychloroquine in the past. Used higher dose of prednisone in the past. She was on prednisone daily in the past.     She was admitted at [facility] in [facility] in April 2023, shows admitted for rhabdomyolysis due to COVID, she was treated with steroids.  Plan to resume CellCept and prednisone after completion of Decadron therapy. Reviewed discharge summary.     She had fractured left 4th metatarsal in winter 2022, when she tripped while getting out of car wearing tight heels with straps.   She has dysphagia, cannot take tablets, she is  taking liquid CellCept, 1500 mg twice daily. Tolerating it well.  She was hospitalized for pneumonia in 8/2022, cough and shortness of breath is slightly better. Completed antibiotics and high dose steroids. Had flare of anti synthetase syndrome at that time, added IVIG.  She took IVIG for few months and stopped since November 2022, she does not like to get those infusions, she does not find them helpful(even though flare was improved with it, CPK normalized) she does not like to continue it and said she will consider it if she flares again.   Currently taking prednisone 7.5 mg daily.   Dr Llanes had discussed OFEV, she has not started it yet.  Had seen neurology and ENT, Dr Miranda.   She started working in [employer] in 3/2022. C/o tiredness and fatigue form that.   She has joint pain in knees and other joints. Also has pain in fingers. No aggravating factors. Knee pain better with rest. Hard for her  to open jars. Morning stiffness for 5 min. Denies red, warm and swollen joints.   Admits swallowing issues since . She has to swallow small pieces. Swallowing issues getting worse. She chokes on solids and liquids. Food does not get stuck in chest. Symptoms are stable.   Admits chronic shortness of breath. Denies productive cough. Patient thinks SOB slightly worse than before.  Shortness of birth worse with walking but denies shortness of breath when she does aerobic exercise.  Admits color changes in hands for the last 15 years, since . Cold is trigger. She will have when its cold, could be twice month or every day if its cold. Episode lasts for 15 min. Never turnwhite, always purple in tips.   She had surgery for vocal cord polyps in  and since then she has dysphonia. Voice issues are same since , not getting worse. She follows with Dr Miranda.     Autoimmune diseases in family: none.   Denies fevers, oral or nasal ulcers, rashes, photosensitivity, history of DVT or PE, history of stroke or seizures, history of Ml, history of inflammatory eye diseases, history of malignancy.   Miscarriages: 2, first trimester miscarriages. Pregnancies: 3   Smoking: nonsmoker.   Denies recent hospitalizations.     Social History     Tobacco Use   Smoking Status Never   Smokeless Tobacco Never          ----------------------------  Allergy  Aortic regurgitation  Arthritis  BMI 22.0-22.9, adult  Deviated nasal septum  Diabetes mellitus  Dysphagia  GERD (gastroesophageal reflux disease)  Hypertension  PAOLA (obstructive sleep apnea)  Pulmonary fibrosis  Pulmonary hypertension  Pulmonic regurgitation  Restrictive lung disease  Sinus bradycardia  Valvular heart disease     Past Surgical History:   Procedure Laterality Date    APPENDECTOMY      CARDIAC CATHETERIZATION  2017     SECTION      COLONOSCOPY  2011    ESOPHAGEAL MANOMETRY  2018    EYE SURGERY  2021    Cataract surgery     GASTROSCOPY  02/15/2018    HYSTERECTOMY  2003    Vocal Cord Surgery  08/2017    cyst removed       Review of patient's allergies indicates:   Allergen Reactions    Codeine Other (See Comments)     Becomes weak and over heated.   Other reaction(s): unknown  Hot flashes       Outpatient Medications Marked as Taking for the 11/7/23 encounter (Office Visit) with Rad Manzanares MD   Medication Sig Dispense Refill    amLODIPine (NORVASC) 5 MG tablet Take 5 mg by mouth once daily.      biotin 5,000 mcg Chew Take 1 tablet by mouth Daily.      cyanocobalamin 500 MCG tablet Take 500 mcg by mouth once daily.      folic acid (FOLVITE) 1 MG tablet Take 1 mg by mouth once daily.      hypromellose (SYSTANE GEL OPHT) Apply 1 drop to eye 3 (three) times daily. I gel drop to each eye TID      ipratropium (ATROVENT) 42 mcg (0.06 %) nasal spray 2 sprays 2 (two) times daily as needed.      metoprolol tartrate (LOPRESSOR) 25 MG tablet TAKE ONE TABLET BY MOUTH TWO TIMES A DAY DISCONTINUE COREG      pantoprazole (PROTONIX) 20 MG tablet Take 1 tablet (20 mg total) by mouth 2 (two) times daily. Take 20 mg by mouth 2 (two) times daily. 180 tablet 3    prednisoLONE acetate (PRED FORTE) 1 % DrpS Place 1 drop into both eyes 2 (two) times daily.      propylene glycol (SYSTANE BALANCE OPHT) Apply 1 drop to eye daily as needed.      triamcinolone (NASACORT) 55 mcg nasal inhaler 2 sprays by Nasal route once daily.      [DISCONTINUED] mycophenolate mofetil (CELLCEPT) 200 mg/mL SusR Take 7.5 mLs (1,500 mg total) by mouth 2 (two) times a day. 450 mL 4    [DISCONTINUED] predniSONE (DELTASONE) 5 MG tablet Take 1.5 tablets by mouth Daily.         Social History     Socioeconomic History    Marital status:    Tobacco Use    Smoking status: Never    Smokeless tobacco: Never   Substance and Sexual Activity    Alcohol use: No    Drug use: No    Sexual activity: Not Currently     Partners: Female     Birth control/protection:  Abstinence        Family History   Problem Relation Age of Onset    Cancer Mother     Diabetes Mellitus Father     Diabetes Father     Vision loss Father     Cancer Sister     Breast cancer Sister 66        Left breast        Immunization History   Administered Date(s) Administered    COVID-19, MRNA, LN-S, PF (MODERNA FULL 0.5 ML DOSE) 10/30/2021, 04/12/2022, 08/06/2022    COVID-19, mRNA, LNP-S, PF (Moderna 2023)Ages 12+ 11/06/2023    Influenza (FLUAD) - Quadrivalent - Adjuvanted - PF *Preferred* (65+) 11/03/2022, 10/06/2023    Influenza (FLUAD) - Trivalent - Adjuvanted - PF (65+) 10/10/2018    Influenza - High Dose - PF (65 years and older) 12/04/2017, 09/17/2019, 09/30/2020, 10/21/2021    Influenza - Trivalent (ADULT) 09/30/2020    Pneumococcal Conjugate - 13 Valent 02/06/2019    Pneumococcal Polysaccharide - 23 Valent 11/03/2022    RSVpreF (Arexvy) 11/06/2023    Td (Adult), Unspecified Formulation 10/21/2020    Tdap 02/06/2019    Zoster Recombinant 11/03/2022, 05/09/2023       Patient Care Team:  Ariana Grewal MD as PCP - General (Internal Medicine)  Mindy Aguilar MD as Consulting Physician (Gastroenterology)     Subjective:       Constitutional:  Denies chills. Denies fever. Denies night sweats. Denies weight loss.   Ophthalmology: Denies blurred vision. Denies dry eyes. Denies eye pain. Denies Itching and redness.   ENT: Denies oral ulcers. Denies epistaxis. Denies dry mouth. Denies swollen glands.   Endocrine: Denies diabetes. Denies thyroid Problems.   Respiratory: Denies cough. Denies shortness of breath. Denies shortness of breath with exertion. Denies hemoptysis.   Cardiovascular: Denies chest pain at rest. Denies chest pain with exertion. Admits palpitations.    Gastrointestinal: Denies abdominal pain. Denies diarrhea. Denies nausea. Denies vomiting. Denies hematemesis or hematochezia. Denies heartburn.  Genitourinary: Denies blood in urine.  Musculoskeletal: See HPI for  "details  Integumentary: Denies rash. Denies photosensitivity.   Peripheral Vascular: Denies Ulcers of hands and/or feet. Denies Cold extremities.   Neurologic: Denies dizziness. Denies headache.  Denies loss of strength. Denies numbness or tingling.   Psychiatric: Denies depression. Denies anxiety. Denies suicidal/homicidal ideations.      Objective:     /71 (BP Location: Left arm, Patient Position: Sitting, BP Method: Small (Automatic))   Pulse 92   Temp 98.6 °F (37 °C) (Oral)   Resp 18   Ht 5' 5" (1.651 m)   Wt 70.3 kg (155 lb)   SpO2 98%   BMI 25.79 kg/m²     Physical Exam    General Appearance: alert, pleasant, in no acute distress.  Skin: Skin color, texture, turgor normal. No rashes or lesions. Few capillary dilatations, no drop outs.   Eyes:  extraocular movement intact (EOMI), pupils equal, round, reactive to light and accommodation, conjunctiva clear.  ENT: No oral or nasal ulcers.  Neck:  Neck supple. No adenopathy.   Lungs: fine crackles in bases, clear through out.   Heart: RRR w/o murmurs.  No edema. Soft systolic murmur present.   Abdomen: Soft, non-tender, no masses, rebound or guarding.  Neuro: Alert, oriented, CN II-XII GI, sensory and motor innervation intact.  Psych: Alert, oriented, normal eye contact.    Joint Exam:  DIPs, PIPs, MCPs: no pain on palpation, no swelling or redness, no nodules. DJD of bilateral hands.   Wrists: no pain on palpation, no swelling or redness, good range of motion.  Elbows: no pain on palpation, no swelling or redness, good range of motion, no nodules.  Feet: no pain on palpation, no swelling or redness, no nodules.    Labs:     August 2021: CMP okay. CBC okay. SPEP showed polyclonal gammopathy and elevated beta proteins. 2/18/2022 :ESR, CRP normal. CBC okay. CMP okay. Globulin and protein slightly high. CPK normal.  mg/g. Rheumatoid factor and anti CCP negative. Positive RNP antibody, 4.6. Positive SSA, 2.7. Negative SCL 70, SSB, [place], dsDNA, " thyroglobulin, TPO, RNA polymerase 3, centromere antibodies. Aldolase normal. ANCA, MPO and KS-3 negative. Cardiolipin, beta-2 glycoprotein negative. LAC negative. Positive ISMAEL, nuclear, cytoplasmic and homogeneous pattern, 1:320. Positive myositis antibody, EJ 78, high titer, PL? is 12, Ml-2 is 17. C3, C4 normal. No blood and protein in urine.   3/24/22: Hep B, C, HIV, quantiferon tb negative.   5/3/2022 ROW elevated 15.0. CBC okay. Glucose 129. CMP okay. CPK and aldolase normal. ESR 24, CRP normal. TPMT low, low metaboilizer.   6/10/2022: CMP okay. LFTs normal. CPK and aldoalse normal. Hemoglobin 11.8. ESR 45, normal less than 30. CRP normal. No blood and protein in urine. C3, C4 okay.  mg/g.   2022- during hospitalization: AST elevated 72, otherwise CMP okay. CPK greater than 2000. Elevated WBC count, otherwise CBC okay. Small protein and no blood in urine.  2022: C3, C4 okay. Urine protein less than 15 mg/dL. No blood and protein in urine. Small leukocyte esterase. WBC count elevated secondary to steroids. WBC count 13.4, hemoglobin 11.5. Bicarb slightly elevated. CMP okay otherwise. CPK elevated 2076. ESR 40, CRP normal. Aldolase 28.   23: CMP ok. ESR 55, CRP normal. Hg 11.7. C3, C4 normal. CPK and aldolase normal.  3/27/23:  UA, upc okay.  CMP okay.  ESR 32.  WBC count 11.8.  CBC okay otherwise.  C3, C4 okay.  CRP normal.  Aldolase and CPK normal.  23:  WBC count elevated 15.3.  Hemoglobin 11.0.  AST 49, ALC 76, upper limits of normal.  Calcium 8.3.  GFR normal.  CPK elevated 1096.  BNP normal.  No protein and blood in urine.  Labs done by her PCP, Dr. Rebollar.  2023; trace protein and no blood in urine.  Upc okay.  CMP okay.  ESR 22.  Hemoglobin 11.0.  CBC okay otherwise.  C3, C4 normal.  CPK and aldolase normal.  CRP normal.      Imagin2021: CT neck soft tissue: no soft tissue abnormalities. Of cervical spine. No enlarged lymph nodes. Thyroid normal.    Retropharyngeal space unremarkable. Normal larynx.   Swallowing study: 10/2021: No penetration or aspiration. Speech pathology evaluation showed moderate to severe oropharyngeal dysphagia, lingual weakness, reduced range of motion and coordination, pharyngeal delay, refused hypo laryngeal elevation, reduced epiglottic inversion and pharyngeal squeeze. Recommend softer fine chopped meats thin liquids, no straws. Crushed meds in puree consistency. Aspiration precautions. ENT and neurological evaluation is recommended.     02/18/22: Chest x-ray showed coarse reticular opacities in the mid and lower lungs, unchanged from October 2021. Calcaneal spurring bilaterally, no erosions. Normal x-ray of bilateral knees. Normal x ray of bilateral hands, no erosions.     Echocardiogram 9/8/2021: Concentric left ventricular hypertrophy. Diastolic dysfunction. Mild aortic insufficiency. Moderate pulmonary hypertension. EF 55 to 60%. PASP 41 mmHg.   03/15/2023:  Echocardiogram 08/30/2022 showed normal systolic function, EF 60-65%, grade 1 diastolic dysfunction, PASP 34 mmHg.     3/16/2022 :CT chest showed old granulomatous disease. Predominantly bibasilar interstitial pulmonary fibrosis. Minimal peripheraI honeycombing. Extensive reticuIonoduIar Iung changes and small calcified lung nodules. Bronchiectasis present. Slightly enlarged mediastinal lymph nodes.   CT chest on 03/15/2023 showed stable pulmonary fibrosis with peripheral honeycombing, bronchiectasis, wall granulomatous disease, stable right lateral chest subpleural dense opacity associated with posterior fissures.  Fine reticular lung markings with ground-glass opacity and ground-glass density.  Stable small right upper lobes pleural nodule.      10/28/2021: PFTs: FVC 71%, FEV1 83%, ratio 91%, total lung capacity 77%, DLCO 22%, DLCO/VA 44%.  2/14/23: PFTs: FVC 75%, FEV1 84%, ratio 86, total lung capacity 78%, DLCO 26%, DLCO/VA 52%.    Previous rheumatology records from  [place]: Dr. Mynor Delarosa: Clinic visit in October 2020: Diagnosed with lupus, pulmonary fibrosis, pulmonary hypertension. She was treated with methotrexate and Plaquenil in the past. Patient has stable and ongoing shortness of PFTs showed markedly low DLCO. Off of her medications currently. Labs unremarkable. Need to order high resolution. Nonproductive cough. Muscle strength normal. Chest x ray October 2020. Dense reticulonodular fibrosis at the lung bases.       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 Mixed connective tissue disease  M35.1 710.8   3. ILD (interstitial lung disease)  J84.9 515   4. Pharyngeal dysphagia  R13.13 787.23   5. Dysphonia  R49.0 784.42   6. Elevated CK  R74.8 790.5   7. High risk medication use  Z79.899 V58.69              Plan:     72-year-old pleasant -American woman with history of lupus, rheumatoid arthritis, mixed connective tissue disease, interstitial lung disease, vocal cord dysfunction, dysphagia here for follow up of antisynthetase syndrome.     1. Antisynthetase syndrome:Positive myositis antibody, EJ 78, high titer, PL7 is 12, Ml-2 is 17. Positive myositis panel, questionable Raynaud's phenomenon, dysphagia, dysphonia. Intermittent elevation of CPK, had flare in 8/2022. Discussed the disease course and treatment options of antisynthetase syndrome. Positive SSA ab associated with ILD and ATS, poor prognosis.  - Started IVIG infusions as she has hoarseness of voice and dysphagia with elevated CPK. Recieved first dose of IVIG 2 g/kg, 130 mg total dose on 9/6/22. Tolerated IVIG well. She had received few more doses and stopped in 12/2022. She does not like to take infusions and will consider in the future if she flares again, refusing to take it currently.  - muscle enzymes have been stable, decrease prednisone dose to 5 mg daily.  - C/w CellCept 1500 mg twice daily, 7.5 ml BID.    - Labs today.  - PFT's stable in 2/2023, CT  chest stable in 3/2023.  - complaining of slight worsening of shortness of breath, requested recent PFTs and CT chest from Dr. Llanes.     2. Color changes in hands: Does not have typical color changes of Raynaud's phenomenon, saw pictures in her phone, hands turn dark when its cold. Few dilated capillary nail folds, no dropouts on exam.      3. ILD (interstitial lung disease): Interstitial lung disease: DLCO low, 22% in October 2021. CT chest showed bibasilar reticulonodular changes and mild honeycombing in March 2022. Admits chronic cough, SOB with exertion. She was referred to pulmonary, she had seen Dr Llanes. Continue close follow-up with Dr. Llanes. OFEV was discussed but never started it, due to swallowing issues.   - Requested and reviewed records from Pulmonary, clinic visit on 03/15/2023:  Echocardiogram 08/30/2022 showed normal systolic function, EF 60-65%, grade 1 diastolic dysfunction, PASP 34 mmHg.  CT chest on 03/15/2023 showed stable pulmonary fibrosis with peripheral honeycombing, bronchiectasis, wall granulomatous disease, stable right lateral chest subpleural dense opacity associated with posterior fissures.  Fine reticular lung markings with ground-glass opacity and ground-glass density.  Stable small right upper lobes pleural nodule.  Patient had denied Ofev and Perfenidone in the past.  Recommend 2.5 L of oxygen at night.    - Will continue to monitor.  - OFEV was prescribed, she has not started it yet.   - Rx as above.      4. Dysphonia/dysphagia: Dysphonia is stable. Dysphagia is getting worse. Dysphagia and dysphonia secondary to antisynthetase syndrome. Swallow evaluation showed severe oropharyngeal dysphagia. Recommend ENT and neurological evaluation. She had seen neurologist Dr Francine Goss and ENT Dr Miranda.   - Advised to follow up with ENT, patient will make follow up.      5. Mixed connective tissue disease: She was treated with lmuran, methotrexate and Plaquenil in the past. Positive  ISMAEL, nuclear, cytoplasmic and homogeneous pattern, 1:320. Positive RNP antibody, 4.6. Positive SSA, 2.7. Positive ISMAEL and SSA antibody likely associated with antisynthetase syndrome. She has features of mixed connective tissue disease and antisynthetase syndrome.   - Check complements and UA every 6 months.      7. Heart murmur, history of heart issues: Requested and reviewed records from cardiology Dr Warren. Echocardiogram in September 2021 showed diastolic dysfunction, normal systolic function and elevated PASP 41 mmHg. Continue close follow-up with cardiology and pulmonary.      8. High risk medication use: Persons with rheumatoid arthritis, lupus, psoriatic arthritis and other autoimmune diseases are at increased risk of cardiovascular disease including heart attack and stroke. We recommend that all patients with these conditions have annual health maintenance exams including lipid measurements, blood pressure measurements, and smoking cessation counseling when applicable at their primary care provider's office.   - Advised to stay up-to-date on age appropriate vaccinations and malignancy screening, including yearly skin exams.    - Reviewed uptodate on vaccines, zoster, pneumovax, reviewed. Received RSV vaccine.      9. Bone health: Advised to take multivitamin and calcium supplementations. She had DXA done in 8/2021, Dr Rebollar monitoring it. Continue follow up with Dr Rebollar for screening of osteoporosis.         Follow up in about 4 months (around 2/28/2024) for at 2:30 with me. In addition to their scheduled follow up, the patient has also been instructed to follow up on as needed basis.      Total time spent with patient and documentation is more than 40 minutes. All questions were answered to patient's satisfaction and patient verbalized understanding.

## 2023-11-08 LAB — CK SERPL-CCNC: 126 U/L (ref 29–168)

## 2023-11-08 NOTE — TELEPHONE ENCOUNTER
Please call lab and ask if CPK can be added to the blood work that was done yesterday morning.

## 2023-12-12 ENCOUNTER — TELEPHONE (OUTPATIENT)
Dept: GASTROENTEROLOGY | Facility: CLINIC | Age: 73
End: 2023-12-12

## 2023-12-12 ENCOUNTER — OFFICE VISIT (OUTPATIENT)
Dept: GASTROENTEROLOGY | Facility: CLINIC | Age: 73
End: 2023-12-12
Payer: COMMERCIAL

## 2023-12-12 VITALS
HEART RATE: 111 BPM | HEIGHT: 65 IN | OXYGEN SATURATION: 97 % | DIASTOLIC BLOOD PRESSURE: 75 MMHG | SYSTOLIC BLOOD PRESSURE: 163 MMHG | WEIGHT: 155.38 LBS | BODY MASS INDEX: 25.89 KG/M2

## 2023-12-12 DIAGNOSIS — R74.8 ELEVATED LIVER ENZYMES: ICD-10-CM

## 2023-12-12 DIAGNOSIS — K21.9 GASTROESOPHAGEAL REFLUX DISEASE, UNSPECIFIED WHETHER ESOPHAGITIS PRESENT: ICD-10-CM

## 2023-12-12 DIAGNOSIS — D64.9 ANEMIA, UNSPECIFIED TYPE: ICD-10-CM

## 2023-12-12 DIAGNOSIS — R93.3 ABNORMAL BARIUM SWALLOW: ICD-10-CM

## 2023-12-12 DIAGNOSIS — R49.0 DYSPHONIA: ICD-10-CM

## 2023-12-12 DIAGNOSIS — R13.19 ESOPHAGEAL DYSPHAGIA: Primary | ICD-10-CM

## 2023-12-12 PROCEDURE — 99214 OFFICE O/P EST MOD 30 MIN: CPT | Mod: S$GLB,,, | Performed by: INTERNAL MEDICINE

## 2023-12-12 PROCEDURE — 99214 PR OFFICE/OUTPT VISIT, EST, LEVL IV, 30-39 MIN: ICD-10-PCS | Mod: S$GLB,,, | Performed by: INTERNAL MEDICINE

## 2023-12-12 RX ORDER — CARVEDILOL 3.12 MG/1
TABLET ORAL
COMMUNITY

## 2023-12-12 RX ORDER — CETIRIZINE HYDROCHLORIDE 10 MG/1
1 TABLET ORAL DAILY
COMMUNITY
End: 2024-02-28

## 2023-12-12 RX ORDER — AMLODIPINE BESYLATE 5 MG/1
1 TABLET ORAL DAILY
COMMUNITY

## 2023-12-12 RX ORDER — FESOTERODINE FUMARATE 4 MG/1
1 TABLET, EXTENDED RELEASE ORAL DAILY
COMMUNITY
End: 2024-02-28

## 2023-12-12 RX ORDER — AZATHIOPRINE 50 MG/1
TABLET ORAL
COMMUNITY
End: 2024-02-28

## 2023-12-12 NOTE — LETTER
December 12, 2023        Ariana Grewal MD  [address] [place] [address] Dr  [address]  [place] [address]             [facility]  [address] [place] [address]  Phone: [phone]  Fax: [phone]   Patient: Devi Fernandes   MR Number: [id number]   YOB: 1950   Date of Visit: 12/12/2023       Dear Dr. Grewal:    Thank you for referring Devi Fernandes to me for evaluation. Attached you will find relevant portions of my assessment and plan of care.    If you have questions, please do not hesitate to call me. I look forward to following Devi Fernandes along with you.    Sincerely,      Mindy Aguilar MD            CC  No Recipients    Enclosure

## 2023-12-12 NOTE — PROGRESS NOTES
Clinic Note    Reason for visit:  The primary encounter diagnosis was Esophageal dysphagia. Diagnoses of Abnormal barium swallow, Gastroesophageal reflux disease, unspecified whether esophagitis present, Anemia, unspecified type, Elevated liver enzymes, and Dysphonia were also pertinent to this visit.    PCP: Ariana Grewal       HPI:  This is a 72 y.o. female here for follow up. Patient with GERD, dysphagia. On pantoprazole 20mg BID. Patient with MCTD followed by Rheumatology. Recent MBS showed gross aspiration. She refuses PEG. She does not restrict her diet. She has been chewing well but does not followed a puree meal. No abdominal pain. Having 4 formed bowel movements daily. Denies rectal bleeding.     8/1/2023 MBS-ST: complete loss of epiglottic inversion with gross aspiration. Not safe for PO intake. Recommended alternate means of nutrition. Patient refuses. Recommended thin liquids, puree foods, crushed medication with foods.    Rheumatology note 3/27/2023: Swallow evaluation showed severe oropharyngeal dysphagia. Recommend ENT and neurological evaluation. She had seen neurologist Dr Francine Goss and ENT Dr Miranda.   She had a scope with Dr. Miranda 4/2023- was told she had a lot of mucus in her esophagus. He did not dilate.    11/7/2023: Hgb 11.9L MCV 82. LFTs WNL, CRP 14.6H  4/23/2023 (During hospital admission): WBC 22H H/H 10.7L/HCT 35L MCV 82, BMP NL. CK 21,881H, AST 683H, ALT 140H, Tbili 0.5 alkp 80     Colonoscopy 10/11/2022: Long redundant colon. IH. Normal otherwise. Repeat colonoscopy in 10 years.     EM 6/5/2018: slight increase in resting UES pressure but normal residual pressure  GES 3/2/2018: Delayed gastric emptying at 2H and 4H c/w gastroparesis  Last EGD 2/15/2018: EBx nl. Order EM                Dysphagia, pharyngoesophageal phase: already on therapy for MCTD which is potentially contributing to some decreased esophageal clearance, has small HH, and potentially skeletal muscle  involvement of her MCTD, improved with pantoprazole 20mg PO BID, EM increased basal UESP and decreased lower esophageal clearance. HOB frame elevated 8 inches. Continue PPI for now and will give BID eRx and may take 1-2 times daily. Goal of no more weight loss. TLC is helping with excessive chewing and fluid intake. Tried Ensure/Boost equivalent one a day but cannot stand the taste. Offered empiric Savary dilation but unclear if will help her dysphagia/hoarseness. Wishes to hold for now; she may contact us if she would like to scheduled. May contact us with any issues.   GERD: EGD 2018 with small HH, likely caused by MCTD for which they are trying to get her off prednisone. Started on AZA about 2014. Continue PPI, elevated HOB in case non-acid reflux is leading to hoarseness.  Mixed connective tissue disease: continue meds as is per rheumatology (main issue)    Review of Systems   Constitutional:  Negative for fatigue, fever and unexpected weight change.   HENT:  Negative for mouth sores, postnasal drip, sore throat and trouble swallowing.    Eyes:  Negative for pain, discharge and eye dryness.   Respiratory:  Positive for apnea, choking and shortness of breath. Negative for cough, chest tightness and wheezing.    Cardiovascular:  Positive for palpitations and leg swelling. Negative for chest pain.   Gastrointestinal:  Positive for reflux. Negative for abdominal distention, abdominal pain, anal bleeding, blood in stool, change in bowel habit, constipation, diarrhea, nausea, rectal pain, vomiting and fecal incontinence.   Genitourinary:  Negative for bladder incontinence, dysuria and hematuria.   Musculoskeletal:  Negative for arthralgias, back pain and joint swelling.   Integumentary:  Negative for color change and rash.   Allergic/Immunologic: Negative for environmental allergies and food allergies.   Neurological:  Negative for seizures and headaches.   Hematological:  Negative for adenopathy. Does not  bruise/bleed easily.        Past Medical History:   Diagnosis Date    Allergy     Aortic regurgitation     Arthritis     BMI 22.0-22.9, adult     Deviated nasal septum     Diabetes mellitus     Dysphagia     GERD (gastroesophageal reflux disease)     Hypertension     PAOLA (obstructive sleep apnea)     Pulmonary fibrosis     Pulmonary hypertension     Pulmonic regurgitation     Restrictive lung disease     Sinus bradycardia     Valvular heart disease      Past Surgical History:   Procedure Laterality Date    APPENDECTOMY      CARDIAC CATHETERIZATION  2017     SECTION      COLONOSCOPY  2011    ESOPHAGEAL MANOMETRY  2018    EYE SURGERY  2021    Cataract surgery    GASTROSCOPY  02/15/2018    HYSTERECTOMY  2003    Vocal Cord Surgery  2017    cyst removed     Family History   Problem Relation Age of Onset    Cancer Mother     Diabetes Mellitus Father     Diabetes Father     Vision loss Father     Cancer Sister     Breast cancer Sister 66        Left breast     Social History     Tobacco Use    Smoking status: Never    Smokeless tobacco: Never   Substance Use Topics    Alcohol use: No    Drug use: No     Review of patient's allergies indicates:   Allergen Reactions    Codeine Other (See Comments)     Becomes weak and over heated.   Other reaction(s): unknown  Hot flashes        Medication List with Changes/Refills   Current Medications    AMLODIPINE (NORVASC) 5 MG TABLET    Take 1 tablet by mouth once daily.    AZATHIOPRINE (IMURAN) 50 MG TAB    Take 1 tablet twice a day by oral route.    BIOTIN 5,000 MCG CHEW    Take 1 tablet by mouth Daily.    CARVEDILOL (COREG) 3.125 MG TABLET    Take 1 tablet twice a day by oral route.    CETIRIZINE (ZYRTEC) 10 MG TABLET    Take 1 tablet by mouth once daily.    FESOTERODINE (TOVIAZ) 4 MG TB24    Take 1 tablet by mouth once daily.    FOLIC ACID (FOLVITE) 1 MG TABLET    Take 1 mg by mouth once daily.    IPRATROPIUM (ATROVENT) 42 MCG (0.06 %) NASAL SPRAY    2  "sprays 2 (two) times daily as needed.    METOPROLOL TARTRATE (LOPRESSOR) 25 MG TABLET    TAKE ONE TABLET BY MOUTH TWO TIMES A DAY DISCONTINUE COREG    MYCOPHENOLATE MOFETIL (CELLCEPT) 200 MG/ML SUSR    Take 7.5 mLs (1,500 mg total) by mouth 2 (two) times a day.    PANTOPRAZOLE (PROTONIX) 20 MG TABLET    Take 1 tablet (20 mg total) by mouth 2 (two) times daily. Take 20 mg by mouth 2 (two) times daily.    PROPYLENE GLYCOL (SYSTANE BALANCE OPHT)    Apply 1 drop to eye daily as needed.    TRIAMCINOLONE (NASACORT) 55 MCG NASAL INHALER    2 sprays by Nasal route once daily.   Discontinued Medications    AMLODIPINE (NORVASC) 5 MG TABLET    Take 5 mg by mouth once daily.    CYANOCOBALAMIN 500 MCG TABLET    Take 500 mcg by mouth once daily.    HYPROMELLOSE (SYSTANE GEL OPHT)    Apply 1 drop to eye 3 (three) times daily. I gel drop to each eye TID    PREDNISOLONE ACETATE (PRED FORTE) 1 % DRPS    Place 1 drop into both eyes 2 (two) times daily.         Vital Signs:  BP (!) 163/75   Pulse (!) 111   Ht 5' 5" (1.651 m)   Wt 70.5 kg (155 lb 6.4 oz)   SpO2 97%   BMI 25.86 kg/m²         Physical Exam  Vitals reviewed.   Constitutional:       General: She is awake. She is not in acute distress.     Appearance: Normal appearance. She is well-developed. She is not ill-appearing, toxic-appearing or diaphoretic.   HENT:      Head: Normocephalic and atraumatic.      Comments: Small mouth opening     Nose: Nose normal.      Mouth/Throat:      Mouth: Mucous membranes are moist.      Pharynx: Oropharynx is clear. No oropharyngeal exudate or posterior oropharyngeal erythema.   Eyes:      General: Lids are normal. Gaze aligned appropriately. No scleral icterus.        Right eye: No discharge.         Left eye: No discharge.      Conjunctiva/sclera: Conjunctivae normal.   Neck:      Trachea: Trachea normal.   Cardiovascular:      Rate and Rhythm: Normal rate and regular rhythm.      Pulses:           Radial pulses are 2+ on the right side " and 2+ on the left side.   Pulmonary:      Effort: Pulmonary effort is normal. No respiratory distress.      Breath sounds: No stridor. No wheezing.   Chest:      Chest wall: No tenderness.   Abdominal:      General: Bowel sounds are normal. There is no distension.      Palpations: Abdomen is soft. There is no fluid wave, hepatomegaly or mass.      Tenderness: There is no abdominal tenderness. There is no guarding or rebound.   Musculoskeletal:         General: No tenderness or deformity.      Cervical back: Full passive range of motion without pain and neck supple. No tenderness.      Right lower leg: No edema.      Left lower leg: No edema.   Lymphadenopathy:      Cervical: No cervical adenopathy.   Skin:     General: Skin is warm and dry.      Capillary Refill: Capillary refill takes less than 2 seconds.      Coloration: Skin is not cyanotic, jaundiced or pale.   Neurological:      General: No focal deficit present.      Mental Status: She is alert and oriented to person, place, and time.      Motor: No tremor.   Psychiatric:         Attention and Perception: Attention normal.         Mood and Affect: Mood and affect normal.         Speech: Speech normal.         Behavior: Behavior normal. Behavior is cooperative.      Comments: Hoarse voice at baseline            All of the data above and below has been reviewed by myself and any further interpretations will be reflected in the assessment and plan.   The data includes review of external notes, and independent interpretation of lab results, procedures, x-rays, and imaging reports.      Assessment:  Esophageal dysphagia    Abnormal barium swallow    Gastroesophageal reflux disease, unspecified whether esophagitis present    Anemia, unspecified type    Elevated liver enzymes  Comments:  During hospital admission for COVID 19- 4/2023. Recent bloodwork show elevated LFTs resolved.    Dysphonia         Dysphagia with abnormal MBS recommended alternative nutrition.  Patient absolutely refuses. Understands risks of aspiration and possible contributor to her pulmonary fibrosis. Repeat endoscopy with dilation will likely not be beneficial.  On pantoprazole 20mg BID for GERD.    Recommendations:    Continue pantoprazole 20mg twice daily.    Risks, benefits, and alternatives of medical management, any associated procedures, and/or treatment discussed with the patient. Patient given opportunity to ask questions and voices understanding. Patient has elected to proceed with the recommended care modalities as discussed.    Instructed patient to notify my office if they have not been contacted within two weeks after any procedures, submitting any samples (biopsies, blood, stool, urine, etc.) or after any imaging (X-ray, CT, MRI, etc.).     Follow up if symptoms worsen or fail to improve.    Order summary:  No orders of the defined types were placed in this encounter.     This assessment, plan, and documentation was performed in collaboration with Monika Silvestre NP.     This document may have been created using a voice recognition transcribing system. Incorrect words or phrases may have been missed during proofreading. Please interpret accordingly or contact me for clarification.     Mindy Aguilar MD

## 2023-12-12 NOTE — PATIENT INSTRUCTIONS
Continue pantoprazole 20mg twice daily.    Please notify my office if you have not been contacted within two weeks after any procedures, submitting any samples (biopsies, blood, stool, urine, etc.) or after any imaging (X-ray, CT, MRI, etc.).

## 2023-12-12 NOTE — TELEPHONE ENCOUNTER
Patient's lung doctor is Dr. Dhaval Llanes in [place].  Phone number [phone].  Fax [phone].  Location [facility] , [address], [place], [place], [facility].     The patient has upcoming pulmonary function tests.  She has an appointment with him the following week.  She will ensure he gets a copy of our last office visit note.  NBP

## 2023-12-15 ENCOUNTER — TELEPHONE (OUTPATIENT)
Dept: GASTROENTEROLOGY | Facility: CLINIC | Age: 73
End: 2023-12-15
Payer: MEDICARE

## 2023-12-15 NOTE — TELEPHONE ENCOUNTER
12-13-23 6:50am -  7:48am Staff attempted to fax recent OV to [phone] per Dr Aguilar request.  After 8 attempts fax failed.    12-14-23 8:46am - 12:15pm   Staff attempted to re fax OV.. after 8 attempts fax failed    12-15-23 11:44am  Staff attempted to call Dr ARIANE Llanes office but got the vm.. staff left message for nurse to call w/a good fax number send records to Dr Llanes

## 2023-12-15 NOTE — TELEPHONE ENCOUNTER
Len Burrell Staff  Caller: Amilcar from Dr Llanes's office (Today, 12:39 PM)  Rtn a call to nurse to provide the fax # [phone] and can be reached at [phone]//thanks/dbw    OV faxed to [phone]... staff called Elvira to let her know the ov has been faxed and conf rec. She advised she will call me back on Monday if she haven't received it.

## 2024-02-28 ENCOUNTER — TELEPHONE (OUTPATIENT)
Dept: RHEUMATOLOGY | Facility: CLINIC | Age: 74
End: 2024-02-28

## 2024-02-28 ENCOUNTER — TELEPHONE (OUTPATIENT)
Dept: RHEUMATOLOGY | Facility: CLINIC | Age: 74
End: 2024-02-28
Payer: MEDICARE

## 2024-02-28 ENCOUNTER — LAB VISIT (OUTPATIENT)
Dept: LAB | Facility: HOSPITAL | Age: 74
End: 2024-02-28
Attending: INTERNAL MEDICINE
Payer: MEDICARE

## 2024-02-28 ENCOUNTER — OFFICE VISIT (OUTPATIENT)
Dept: RHEUMATOLOGY | Facility: CLINIC | Age: 74
End: 2024-02-28
Payer: MEDICARE

## 2024-02-28 VITALS
HEIGHT: 65 IN | SYSTOLIC BLOOD PRESSURE: 133 MMHG | TEMPERATURE: 98 F | WEIGHT: 157 LBS | RESPIRATION RATE: 18 BRPM | HEART RATE: 105 BPM | BODY MASS INDEX: 26.16 KG/M2 | OXYGEN SATURATION: 99 % | DIASTOLIC BLOOD PRESSURE: 70 MMHG

## 2024-02-28 DIAGNOSIS — R74.8 ELEVATED CK: ICD-10-CM

## 2024-02-28 DIAGNOSIS — D84.821 DRUG-INDUCED IMMUNODEFICIENCY: ICD-10-CM

## 2024-02-28 DIAGNOSIS — Z79.899 DRUG-INDUCED IMMUNODEFICIENCY: ICD-10-CM

## 2024-02-28 DIAGNOSIS — R13.13 PHARYNGEAL DYSPHAGIA: ICD-10-CM

## 2024-02-28 DIAGNOSIS — J84.9 ILD (INTERSTITIAL LUNG DISEASE): ICD-10-CM

## 2024-02-28 DIAGNOSIS — D89.89 ANTISYNTHETASE SYNDROME: Primary | ICD-10-CM

## 2024-02-28 DIAGNOSIS — R49.0 DYSPHONIA: ICD-10-CM

## 2024-02-28 DIAGNOSIS — I27.20 PULMONARY HYPERTENSION, UNSPECIFIED: ICD-10-CM

## 2024-02-28 DIAGNOSIS — D89.89 ANTISYNTHETASE SYNDROME: ICD-10-CM

## 2024-02-28 DIAGNOSIS — M35.1 MIXED CONNECTIVE TISSUE DISEASE: ICD-10-CM

## 2024-02-28 PROBLEM — M33.20 POLYMYOSITIS ASSOCIATED WITH AUTOIMMUNE DISEASE: Status: RESOLVED | Noted: 2019-07-30 | Resolved: 2024-02-28

## 2024-02-28 PROBLEM — M35.9 POLYMYOSITIS ASSOCIATED WITH AUTOIMMUNE DISEASE: Status: RESOLVED | Noted: 2019-07-30 | Resolved: 2024-02-28

## 2024-02-28 LAB
ALBUMIN SERPL-MCNC: 3.7 G/DL (ref 3.4–4.8)
ALBUMIN/GLOB SERPL: 0.9 RATIO (ref 1.1–2)
ALP SERPL-CCNC: 81 UNIT/L (ref 40–150)
ALT SERPL-CCNC: 11 UNIT/L (ref 0–55)
AST SERPL-CCNC: 42 UNIT/L (ref 5–34)
BASOPHILS # BLD AUTO: 0.03 X10(3)/MCL
BASOPHILS NFR BLD AUTO: 0.4 %
BILIRUB SERPL-MCNC: 0.3 MG/DL
BUN SERPL-MCNC: 6.8 MG/DL (ref 9.8–20.1)
CALCIUM SERPL-MCNC: 9 MG/DL (ref 8.4–10.2)
CHLORIDE SERPL-SCNC: 107 MMOL/L (ref 98–107)
CK SERPL-CCNC: 7946 U/L (ref 29–168)
CO2 SERPL-SCNC: 28 MMOL/L (ref 23–31)
CREAT SERPL-MCNC: 0.8 MG/DL (ref 0.55–1.02)
EOSINOPHIL # BLD AUTO: 0.06 X10(3)/MCL (ref 0–0.9)
EOSINOPHIL NFR BLD AUTO: 0.8 %
ERYTHROCYTE [DISTWIDTH] IN BLOOD BY AUTOMATED COUNT: 15.9 % (ref 11.5–17)
GFR SERPLBLD CREATININE-BSD FMLA CKD-EPI: >60 MLS/MIN/1.73/M2
GLOBULIN SER-MCNC: 3.9 GM/DL (ref 2.4–3.5)
GLUCOSE SERPL-MCNC: 104 MG/DL (ref 82–115)
HCT VFR BLD AUTO: 36.8 % (ref 37–47)
HGB BLD-MCNC: 11.1 G/DL (ref 12–16)
IMM GRANULOCYTES # BLD AUTO: 0.02 X10(3)/MCL (ref 0–0.04)
IMM GRANULOCYTES NFR BLD AUTO: 0.3 %
LYMPHOCYTES # BLD AUTO: 2.03 X10(3)/MCL (ref 0.6–4.6)
LYMPHOCYTES NFR BLD AUTO: 28.2 %
MCH RBC QN AUTO: 24.4 PG (ref 27–31)
MCHC RBC AUTO-ENTMCNC: 30.2 G/DL (ref 33–36)
MCV RBC AUTO: 81.1 FL (ref 80–94)
MONOCYTES # BLD AUTO: 0.68 X10(3)/MCL (ref 0.1–1.3)
MONOCYTES NFR BLD AUTO: 9.5 %
NEUTROPHILS # BLD AUTO: 4.37 X10(3)/MCL (ref 2.1–9.2)
NEUTROPHILS NFR BLD AUTO: 60.8 %
NRBC BLD AUTO-RTO: 0 %
PLATELET # BLD AUTO: 267 X10(3)/MCL (ref 130–400)
PMV BLD AUTO: 10 FL (ref 7.4–10.4)
POTASSIUM SERPL-SCNC: 3.7 MMOL/L (ref 3.5–5.1)
PROT SERPL-MCNC: 7.6 GM/DL (ref 5.8–7.6)
RBC # BLD AUTO: 4.54 X10(6)/MCL (ref 4.2–5.4)
SODIUM SERPL-SCNC: 143 MMOL/L (ref 136–145)
WBC # SPEC AUTO: 7.19 X10(3)/MCL (ref 4.5–11.5)

## 2024-02-28 PROCEDURE — 99215 OFFICE O/P EST HI 40 MIN: CPT | Mod: PBBFAC | Performed by: INTERNAL MEDICINE

## 2024-02-28 PROCEDURE — 80053 COMPREHEN METABOLIC PANEL: CPT

## 2024-02-28 PROCEDURE — 99215 OFFICE O/P EST HI 40 MIN: CPT | Mod: S$PBB,,, | Performed by: INTERNAL MEDICINE

## 2024-02-28 PROCEDURE — 82550 ASSAY OF CK (CPK): CPT

## 2024-02-28 PROCEDURE — 85025 COMPLETE CBC W/AUTO DIFF WBC: CPT

## 2024-02-28 PROCEDURE — 82085 ASSAY OF ALDOLASE: CPT

## 2024-02-28 PROCEDURE — 36415 COLL VENOUS BLD VENIPUNCTURE: CPT

## 2024-02-28 RX ORDER — MYCOPHENOLATE MOFETIL 200 MG/ML
1500 POWDER, FOR SUSPENSION ORAL 2 TIMES DAILY
Qty: 450 ML | Refills: 3 | Status: SHIPPED | OUTPATIENT
Start: 2024-02-28 | End: 2024-04-30 | Stop reason: SDUPTHER

## 2024-02-28 RX ORDER — PREDNISONE 5 MG/1
5 TABLET ORAL DAILY
COMMUNITY
End: 2024-02-28 | Stop reason: SDUPTHER

## 2024-02-28 RX ORDER — PREDNISONE 5 MG/1
5 TABLET ORAL DAILY
Qty: 30 TABLET | Refills: 3 | Status: SHIPPED | OUTPATIENT
Start: 2024-02-28 | End: 2024-02-29 | Stop reason: SDUPTHER

## 2024-02-28 NOTE — TELEPHONE ENCOUNTER
Muscle enzyme levels are elevated, she is having flare of myositis.     Ask her to increase dose of prednisone to 20 mg daily. I have send new Rx for prednisone. CellCept is not enough to control the disease, I will have to escalate the therapy.     I will probably add Prograf if its available in liquid or dissolvable formulation.      Tram can you check on Prograf oral packet and let me know if its available to order or not. Thanks.

## 2024-02-28 NOTE — PROGRESS NOTES
Patient ID: [id number]     Chief Complaint: Antisynthetase syndrome (Patient states she is doing well today no complaints verbalized. )      HPI:     Devi Fernandes is a 73 y.o. 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       She was diagnosed with lupus in 2008. She had seen rheumatologist in [place] in the past, does not remember the name. She was treated with prednisone. She was treated with some medications back then, she does not remember the name on medications. She may have been on lmuran, methotrexate and hydroxychloroquine in the past. Used higher dose of prednisone in the past. She was on prednisone daily in the past.     She was admitted at [facility] in [facility] in April 2023, shows admitted for rhabdomyolysis due to COVID, she was treated with steroids.  Plan to resume CellCept and prednisone after completion of Decadron therapy. Reviewed discharge summary.     She had fractured left 4th metatarsal in winter 2022, when she tripped while getting out of car wearing tight heels with straps.   She has dysphagia, cannot take tablets, she is  taking liquid CellCept, 1500 mg twice daily. Tolerating it well.  She had flare of anti synthetase syndrome in 8/2022, added IVIG. She took IVIG for few months and stopped in November 2022, she does not like to get those infusions, she does not find them helpful(even though flare was improved with it, CPK normalized) she does not like to continue it and said she will consider it if she flares again.   Currently taking prednisone 5 mg daily.   Dr Llanes had discussed OFEV, she has not started it yet because of dysphagia, she can not swallow capsules.   Had seen neurology and ENT, Dr Miranda for dysphagia and dysphonia.   She has joint pain in knees and other joints. Also has pain in fingers. No aggravating factors. Knee pain better with rest. Hard for her to open jars. Morning stiffness for 5 min. Denies red, warm and swollen joints.   Admits  swallowing issues since . She has to swallow small pieces. Swallowing issues getting worse. She chokes on solids and liquids. Food does not get stuck in chest intermittently. Symptoms are stable.   Admits chronic shortness of breath. Denies productive cough. Patient thinks SOB slightly worse than before.  Shortness of breath worse with walking but denies shortness of breath when she does aerobic exercise.  Admits color changes in hands for the last 15 years, since . Cold is trigger. She will have when its cold, could be twice month or every day if its cold. Episode lasts for 15 min. Never turnwhite, always purple in tips.   She had surgery for vocal cord polyps in  and since then she has dysphonia. Voice issues are same since , not getting worse. She follows with Dr Miranda.     Autoimmune diseases in family: none.   Denies fevers, oral or nasal ulcers, rashes, photosensitivity, history of DVT or PE, history of stroke or seizures, history of Ml, history of inflammatory eye diseases, history of malignancy.   Miscarriages: 2, first trimester miscarriages. Pregnancies: 3   Smoking: nonsmoker.   Denies recent hospitalizations.     Social History     Tobacco Use   Smoking Status Never   Smokeless Tobacco Never          ----------------------------  Allergy  Aortic regurgitation  Arthritis  BMI 22.0-22.9, adult  Deviated nasal septum  Diabetes mellitus  Dysphagia  GERD (gastroesophageal reflux disease)  Hypertension  PAOLA (obstructive sleep apnea)  Pulmonary fibrosis  Pulmonary hypertension  Pulmonic regurgitation  Restrictive lung disease  Sinus bradycardia  Valvular heart disease     Past Surgical History:   Procedure Laterality Date    APPENDECTOMY      CARDIAC CATHETERIZATION  2017     SECTION      COLONOSCOPY  2011    ESOPHAGEAL MANOMETRY  2018    EYE SURGERY  2021    Cataract surgery    GASTROSCOPY  02/15/2018    HYSTERECTOMY  2003    Vocal Cord Surgery  2017     cyst removed       Review of patient's allergies indicates:   Allergen Reactions    Codeine Other (See Comments)     Becomes weak and over heated.   Other reaction(s): unknown  Hot flashes       Outpatient Medications Marked as Taking for the 2/28/24 encounter (Office Visit) with Rad Manzanares MD   Medication Sig Dispense Refill    amLODIPine (NORVASC) 5 MG tablet Take 1 tablet by mouth once daily.      biotin 5,000 mcg Chew Take 1 tablet by mouth Daily.      carvediloL (COREG) 3.125 MG tablet Take 1 tablet twice a day by oral route.      ipratropium (ATROVENT) 42 mcg (0.06 %) nasal spray 2 sprays 2 (two) times daily as needed.      metoprolol tartrate (LOPRESSOR) 25 MG tablet TAKE ONE TABLET BY MOUTH TWO TIMES A DAY DISCONTINUE COREG      pantoprazole (PROTONIX) 20 MG tablet Take 1 tablet (20 mg total) by mouth 2 (two) times daily. Take 20 mg by mouth 2 (two) times daily. 180 tablet 3    propylene glycol (SYSTANE BALANCE OPHT) Apply 1 drop to eye daily as needed.      triamcinolone (NASACORT) 55 mcg nasal inhaler 2 sprays by Nasal route once daily.      [DISCONTINUED] mycophenolate mofetil (CELLCEPT) 200 mg/mL SusR Take 7.5 mLs (1,500 mg total) by mouth 2 (two) times a day. 1350 mL 1       Social History     Socioeconomic History    Marital status:    Tobacco Use    Smoking status: Never    Smokeless tobacco: Never   Substance and Sexual Activity    Alcohol use: No    Drug use: No    Sexual activity: Not Currently     Partners: Female     Birth control/protection: Abstinence        Family History   Problem Relation Age of Onset    Cancer Mother     Diabetes Mellitus Father     Diabetes Father     Vision loss Father     Cancer Sister     Breast cancer Sister 66        Left breast        Immunization History   Administered Date(s) Administered    COVID-19, MRNA, LN-S, PF (MODERNA FULL 0.5 ML DOSE) 10/30/2021, 04/12/2022, 08/06/2022    COVID-19, mRNA, LNP-S, PF (Moderna 2023)Ages 12+  11/06/2023    Influenza (FLUAD) - Quadrivalent - Adjuvanted - PF *Preferred* (65+) 11/03/2022, 10/06/2023    Influenza (FLUAD) - Trivalent - Adjuvanted - PF (65+) 10/10/2018    Influenza - High Dose - PF (65 years and older) 12/04/2017, 09/17/2019, 09/30/2020, 10/21/2021    Influenza - Trivalent (ADULT) 09/30/2020    Pneumococcal Conjugate - 13 Valent 02/06/2019    Pneumococcal Polysaccharide - 23 Valent 11/03/2022    RSVpreF (Arexvy) 11/06/2023    Td (Adult), Unspecified Formulation 10/21/2020    Tdap 02/06/2019    Zoster Recombinant 11/03/2022, 05/09/2023       Patient Care Team:  Ariana Grewal MD as PCP - General (Internal Medicine)  Mindy Aguilar MD as Consulting Physician (Gastroenterology)     Subjective:       Constitutional:  Denies chills. Denies fever. Denies night sweats. Denies weight loss.   Ophthalmology: Denies blurred vision. Denies dry eyes. Denies eye pain. Denies Itching and redness.   ENT: Denies oral ulcers. Denies epistaxis. Denies dry mouth. Denies swollen glands.   Endocrine: Denies diabetes. Denies thyroid Problems.   Respiratory: Denies cough. Denies shortness of breath. Denies shortness of breath with exertion. Denies hemoptysis.   Cardiovascular: Denies chest pain at rest. Denies chest pain with exertion. Admits palpitations.    Gastrointestinal: Denies abdominal pain. Denies diarrhea. Denies nausea. Denies vomiting. Denies hematemesis or hematochezia. Denies heartburn.  Genitourinary: Denies blood in urine.  Musculoskeletal: See HPI for details  Integumentary: Denies rash. Denies photosensitivity.   Peripheral Vascular: Denies Ulcers of hands and/or feet. Denies Cold extremities.   Neurologic: Denies dizziness. Denies headache.  Denies loss of strength. Denies numbness or tingling.   Psychiatric: Denies depression. Denies anxiety. Denies suicidal/homicidal ideations.      Objective:     /70 (BP Location: Left arm, Patient Position: Sitting, BP Method: Medium  "(Automatic))   Pulse 105   Temp 98 °F (36.7 °C) (Oral)   Resp 18   Ht 5' 5" (1.651 m)   Wt 71.2 kg (157 lb)   SpO2 99%   BMI 26.13 kg/m²     Physical Exam    General Appearance: alert, pleasant, in no acute distress.  Skin: Skin color, texture, turgor normal. No rashes or lesions. Few capillary dilatations, no drop outs.   Eyes:  extraocular movement intact (EOMI), pupils equal, round, reactive to light and accommodation, conjunctiva clear.  ENT: No oral or nasal ulcers.  Neck:  Neck supple. No adenopathy.   Lungs: fine crackles in bases, clear through out.   Heart: RRR.  No edema. Soft systolic murmur present.   Abdomen: Soft, non-tender, no masses, rebound or guarding.  Neuro: Alert, oriented, CN II-XII GI, sensory and motor innervation intact.  Psych: Alert, oriented, normal eye contact.    Joint Exam:  DIPs, PIPs, MCPs: no pain on palpation, no swelling or redness, no nodules. DJD of bilateral hands.   Wrists: no pain on palpation, no swelling or redness, good range of motion.  Elbows: no pain on palpation, no swelling or redness, good range of motion, no nodules.  Feet: no pain on palpation, no swelling or redness, no nodules.    Labs:     August 2021: CMP okay. CBC okay. SPEP showed polyclonal gammopathy and elevated beta proteins. 2/18/2022 :ESR, CRP normal. CBC okay. CMP okay. Globulin and protein slightly high. CPK normal.  mg/g. Rheumatoid factor and anti CCP negative. Positive RNP antibody, 4.6. Positive SSA, 2.7. Negative SCL 70, SSB, Smith, dsDNA, thyroglobulin, TPO, RNA polymerase 3, centromere antibodies. Aldolase normal. ANCA, MPO and IN-3 negative. Cardiolipin, beta-2 glycoprotein negative. LAC negative. Positive ISMAEL, nuclear, cytoplasmic and homogeneous pattern, 1:320. Positive myositis antibody, EJ 78, high titer, PL? is 12, Ml-2 is 17. C3, C4 normal. No blood and protein in urine.   3/24/22: Hep B, C, HIV, quantiferon tb negative.   5/3/2022 ROW elevated 15.0. CBC okay. Glucose 129. " CMP okay. CPK and aldolase normal. ESR 24, CRP normal. TPMT low, low metaboilizer.   6/10/2022: CMP okay. LFTs normal. CPK and aldoalse normal. Hemoglobin 11.8. ESR 45, normal less than 30. CRP normal. No blood and protein in urine. C3, C4 okay.  mg/g.   2022- during hospitalization: AST elevated 72, otherwise CMP okay. CPK greater than 2000. Elevated WBC count, otherwise CBC okay. Small protein and no blood in urine.  2022: C3, C4 okay. Urine protein less than 15 mg/dL. No blood and protein in urine. Small leukocyte esterase. WBC count elevated secondary to steroids. WBC count 13.4, hemoglobin 11.5. Bicarb slightly elevated. CMP okay otherwise. CPK elevated 2076. ESR 40, CRP normal. Aldolase 28.   23: CMP ok. ESR 55, CRP normal. Hg 11.7. C3, C4 normal. CPK and aldolase normal.  3/27/23:  UA, upc okay.  CMP okay.  ESR 32.  WBC count 11.8.  CBC okay otherwise.  C3, C4 okay.  CRP normal.  Aldolase and CPK normal.  23:  WBC count elevated 15.3.  Hemoglobin 11.0.  AST 49, ALC 76, upper limits of normal.  Calcium 8.3.  GFR normal.  CPK elevated 1096.  BNP normal.  No protein and blood in urine.  Labs done by her PCP, Dr. Rebollar.  2023; trace protein and no blood in urine.  Upc okay.  CMP okay.  ESR 22.  Hemoglobin 11.0.  CBC okay otherwise.  C3, C4 normal.  CPK and aldolase normal.  CRP normal.    23:  CPK normal.  Upc okay.  Trace protein and no blood in urine urine.  CRP 14.6.  CMP okay.  ESR 33.  Hemoglobin 11.9.  Aldolase normal.  C3, C4 okay    Spoke with Dr Llanes, he will get CT chest.     Imagin2021: CT neck soft tissue: no soft tissue abnormalities. Of cervical spine. No enlarged lymph nodes. Thyroid normal.   Retropharyngeal space unremarkable. Normal larynx.   Swallowing study: 10/2021: No penetration or aspiration. Speech pathology evaluation showed moderate to severe oropharyngeal dysphagia, lingual weakness, reduced range of motion and coordination,  pharyngeal delay, refused hypo laryngeal elevation, reduced epiglottic inversion and pharyngeal squeeze. Recommend softer fine chopped meats thin liquids, no straws. Crushed meds in puree consistency. Aspiration precautions. ENT and neurological evaluation is recommended.     02/18/22: Chest x-ray showed coarse reticular opacities in the mid and lower lungs, unchanged from October 2021. Calcaneal spurring bilaterally, no erosions. Normal x-ray of bilateral knees. Normal x ray of bilateral hands, no erosions.     Echocardiogram 9/8/2021: Concentric left ventricular hypertrophy. Diastolic dysfunction. Mild aortic insufficiency. Moderate pulmonary hypertension. EF 55 to 60%. PASP 41 mmHg.   03/15/2023:  Echocardiogram 08/30/2022 showed normal systolic function, EF 60-65%, grade 1 diastolic dysfunction, PASP 34 mmHg.     3/16/2022 :CT chest showed old granulomatous disease. Predominantly bibasilar interstitial pulmonary fibrosis. Minimal peripheraI honeycombing. Extensive reticuIonoduIar Iung changes and small calcified lung nodules. Bronchiectasis present. Slightly enlarged mediastinal lymph nodes.   CT chest on 03/15/2023 showed stable pulmonary fibrosis with peripheral honeycombing, bronchiectasis, old granulomatous disease, stable right lateral chest subpleural dense opacity associated with posterior fissures.  Fine reticular lung markings with ground-glass opacity and ground-glass density.  Stable small right upper lobes pleural nodule.      10/28/2021: PFTs: FVC 71%, FEV1 83%, ratio 91%, total lung capacity 77%, DLCO 22%, DLCO/VA 44%.  2/14/23: PFTs: FVC 75%, FEV1 84%, ratio 86, total lung capacity 78%, DLCO 26%, DLCO/VA 52%.  09/18/2023 PFTs showed FVC 75%, FEV1 89%, ratio 92, total lung capacity 63%, residual volume 56, DLCO 19, DLCO/VA 39.    Previous rheumatology records from [place]: Dr. Mynor Delarosa: Clinic visit in October 2020: Diagnosed with lupus, pulmonary fibrosis, pulmonary hypertension. She was  treated with methotrexate and Plaquenil in the past. Patient has stable and ongoing shortness of PFTs showed markedly low DLCO. Off of her medications currently. Labs unremarkable. Need to order high resolution. Nonproductive cough. Muscle strength normal. Chest x ray October 2020. Dense reticulonodular fibrosis at the lung bases.       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 Mixed connective tissue disease  M35.1 710.8   3. ILD (interstitial lung disease)  J84.9 515   4. Pharyngeal dysphagia  R13.13 787.23   5. Dysphonia  R49.0 784.42   6. Elevated CK  R74.8 790.5   7. Drug-induced immunodeficiency  D84.821 279.3    Z79.899 E947.9   8. Pulmonary hypertension, unspecified  I27.20 416.8                Plan:     73-year-old pleasant -American woman with history of lupus, rheumatoid arthritis, mixed connective tissue disease, interstitial lung disease, vocal cord dysfunction, dysphagia here for follow up of antisynthetase syndrome.     1. Antisynthetase syndrome:Positive myositis antibody, EJ 78, high titer, PL7 is 12, Ml-2 is 17. Positive myositis panel, questionable Raynaud's phenomenon, dysphagia, dysphonia. Intermittent elevation of CPK, had flare in 8/2022. Discussed the disease course and treatment options of antisynthetase syndrome. Positive SSA ab associated with ILD and ATS, poor prognosis.  - Started IVIG infusions as she has hoarseness of voice and dysphagia with elevated CPK. Recieved first dose of IVIG 2 g/kg, 130 mg total dose on 9/6/22. Tolerated IVIG well. She had received few more doses and stopped in 12/2022. She does not like to take infusions and will consider in the future if she flares again, refusing to take it currently.  - muscle enzymes have been stable, decreased prednisone dose to 5 mg daily. Continue with same dose.   - C/w CellCept 1500 mg twice daily, 7.5 ml BID.    - Labs today.  - PFT's stable in 2/2023, CT chest stable in 3/2023.  -  Mild decline in DLCO in 9/2023, repeat PFT's are scheduled for 5/2023.   - Ordered CT chest and echocardiogram.      2. Color changes in hands: Does not have typical color changes of Raynaud's phenomenon, saw pictures in her phone, hands turn dark when its cold. Few dilated capillary nail folds, no dropouts on exam.      3. ILD (interstitial lung disease): Interstitial lung disease: DLCO low, 22% in October 2021. CT chest showed bibasilar reticulonodular changes and mild honeycombing in March 2022. Admits chronic cough, SOB with exertion. She was referred to pulmonary, she had seen Dr Llanes. Continue close follow-up with Dr. Llanes. OFEV was discussed but never started it, due to swallowing issues.   - Requested and reviewed records from Pulmonary, clinic visit on 03/15/2023:  Echocardiogram 08/30/2022 showed normal systolic function, EF 60-65%, grade 1 diastolic dysfunction, PASP 34 mmHg.  CT chest on 03/15/2023 showed stable pulmonary fibrosis with peripheral honeycombing, bronchiectasis, old granulomatous disease, stable right lateral chest subpleural dense opacity associated with posterior fissures.  Fine reticular lung markings with ground-glass opacity and ground-glass density.  Stable small right upper lobes pleural nodule. Recommend 2.5 L of oxygen at night.    - Will continue to monitor.  - OFEV was prescribed but she can not tolerate it because of dysphagia, can not swallow capsules.  09/18/2023 PFTs showed FVC 75%, FEV1 89%, ratio 92, total lung capacity 63%, residual volume 56, DLCO 19, DLCO/VA 39.   - Rx plan as above.      4. Dysphonia/dysphagia: Dysphonia is stable. Dysphagia is getting worse. Dysphagia and dysphonia secondary to antisynthetase syndrome. Swallow evaluation showed severe oropharyngeal dysphagia. Recommend ENT and neurological evaluation. She had seen neurologist Dr Francine Goss and ENT Dr Miranda.   - Advised to follow up with ENT.     5. Mixed connective tissue disease: She was  treated with lmuran, methotrexate and Plaquenil in the past. Positive ISMAEL, nuclear, cytoplasmic and homogeneous pattern, 1:320. Positive RNP antibody, 4.6. Positive SSA, 2.7. Positive ISMAEL and SSA antibody likely associated with antisynthetase syndrome. She has features of mixed connective tissue disease and antisynthetase syndrome.   - Check complements and UA every 6 months.      7. Heart murmur, history of heart issues: Requested and reviewed records from cardiology Dr Warren. Echocardiogram in September 2021 showed diastolic dysfunction, normal systolic function and elevated PASP 41 mmHg. Continue close follow-up with cardiology and pulmonary.      8. High risk medication use: Persons with rheumatoid arthritis, lupus, psoriatic arthritis and other autoimmune diseases are at increased risk of cardiovascular disease including heart attack and stroke. We recommend that all patients with these conditions have annual health maintenance exams including lipid measurements, blood pressure measurements, and smoking cessation counseling when applicable at their primary care provider's office.   - Advised to stay up-to-date on age appropriate vaccinations and malignancy screening, including yearly skin exams.    - Reviewed uptodate on vaccines, zoster, pneumovax, reviewed. Received RSV vaccine.      9. Bone health: Advised to take multivitamin and calcium supplementations. She had DXA done in 8/2021, Dr Rebollar monitoring it. Continue follow up with Dr Rebollar for screening of osteoporosis.         Follow up in about 3 months (around 5/28/2024) for with NP and 6 months with me. In addition to their scheduled follow up, the patient has also been instructed to follow up on as needed basis.      Total time spent with patient and documentation is 40 minutes. All questions were answered to patient's satisfaction and patient verbalized understanding.

## 2024-02-29 ENCOUNTER — PATIENT MESSAGE (OUTPATIENT)
Dept: RHEUMATOLOGY | Facility: CLINIC | Age: 74
End: 2024-02-29
Payer: MEDICARE

## 2024-02-29 RX ORDER — PREDNISONE 10 MG/1
20 TABLET ORAL DAILY
Qty: 60 TABLET | Refills: 1 | Status: SHIPPED | OUTPATIENT
Start: 2024-02-29 | End: 2024-04-30 | Stop reason: ALTCHOICE

## 2024-02-29 RX ORDER — TACROLIMUS 1 MG/1
1 GRANULE, FOR SUSPENSION ORAL 2 TIMES DAILY
Qty: 60 PACKET | Refills: 1 | Status: ACTIVE | OUTPATIENT
Start: 2024-02-29 | End: 2024-03-03 | Stop reason: CLARIF

## 2024-02-29 NOTE — TELEPHONE ENCOUNTER
Ok thank you Tram.     She will increase Prednisone dose to 20 mg daily. I sent new Rx for prednisone 10 mg tabs, so she can take two of them daily.     Muscle enzymes elevated to 7000, I am increasing prednisone and adding Prograf to prevent the flare form getting bad. She is having flare of myositis.     Continue CellCept at same dose.     Start Prograf 1 mg BID, sent Rx to [facility]. Please check with her next week to make sure she gets the medication.     Labs in 4 weeks with these med changes.

## 2024-03-01 PROBLEM — D89.89 ANTISYNTHETASE SYNDROME: Status: ACTIVE | Noted: 2024-03-01

## 2024-03-01 LAB — ALDOLASE SERPL-CCNC: 44.7 U/L

## 2024-03-01 NOTE — TELEPHONE ENCOUNTER
Spoke with Elvira from Pulm clinic of [facility] last CT was from 12/2023 and her next one is due 5/2023. CT results requested.

## 2024-03-03 ENCOUNTER — TELEPHONE (OUTPATIENT)
Dept: RHEUMATOLOGY | Facility: CLINIC | Age: 74
End: 2024-03-03
Payer: MEDICARE

## 2024-03-03 RX ORDER — TACROLIMUS 1 MG/1
1 CAPSULE ORAL EVERY 12 HOURS
Qty: 60 CAPSULE | Refills: 1 | Status: SHIPPED | OUTPATIENT
Start: 2024-03-03 | End: 2024-04-30 | Stop reason: ALTCHOICE

## 2024-03-04 ENCOUNTER — TELEPHONE (OUTPATIENT)
Dept: RHEUMATOLOGY | Facility: CLINIC | Age: 74
End: 2024-03-04
Payer: MEDICARE

## 2024-03-04 NOTE — TELEPHONE ENCOUNTER
Ok, Discontinued the granule packet and ordered Tacrolimus capsules to [facility] pharmacy.    Tram, please let patient know. Thank you.

## 2024-03-04 NOTE — TELEPHONE ENCOUNTER
Called and spoke with Kathryn at [facility] and was told that her co-pay is $29.03 for prograf capsules. I saw that prograf was already sent to [facility]'s on 3/3/24.

## 2024-03-04 NOTE — TELEPHONE ENCOUNTER
Please contact [facility] [phone] to set up for patient for Tacrolimus (Prograf) oral suspension 1 mg by mouth twice a day ~10 days before the next refill. Ask for Kanwal Galeas [phone]. Patient will have to call the pharmacy herself to set up an account for payment and delivery.

## 2024-03-04 NOTE — TELEPHONE ENCOUNTER
----- Message from Kathryn Arita PharmD sent at 3/1/2024  4:51 PM CST -----  Regarding: Prograf  Good afternoon,     [facility] received the order for the Prograf packets. Unfortunately, it only comes in brand name, in a box of 50 and would cost the patient $64.51 every 25 days. There are no grants or PAP available. She said that this is unaffordable.     Per [employer], the patient can:   Open the capsule(s) and place the powder content under the tongue for at least 15 minutes or until completely dissolved. Avoid all food, beverages, mechanical suctioning, and swallowing for 30 minutes after administration       We spoke to the patient  and she is willing to try the Tacrolimus capsules by opening them up and placing under the tongue. If appropriate, would you consider switching to the capsules for the patient to try this way?    Thanks,     Kathryn Arita, PharmD   [facility] [place] [address]  [phone] (phone)  [phone] (fax)

## 2024-03-04 NOTE — TELEPHONE ENCOUNTER
----- Message from Kathryn Arita PharmD sent at 3/1/2024  4:51 PM CST -----  Regarding: Prograf  Good afternoon,     [facility] received the order for the Prograf packets. Unfortunately, it only comes in brand name, in a box of 50 and would cost the patient $64.51 every 25 days. There are no grants or PAP available. She said that this is unaffordable.     Per [employer], the patient can:   Open the capsule(s) and place the powder content under the tongue for at least 15 minutes or until completely dissolved. Avoid all food, beverages, mechanical suctioning, and swallowing for 30 minutes after administration       We spoke to the patient  and she is willing to try the Tacrolimus capsules by opening them up and placing under the tongue. If appropriate, would you consider switching to the capsules for the patient to try this way?    Thanks,     Kathryn Arita, PharmD   [facility] [place] [address]  [phone] (phone)  [phone] (fax)

## 2024-03-06 ENCOUNTER — HOSPITAL ENCOUNTER (OUTPATIENT)
Dept: RADIOLOGY | Facility: HOSPITAL | Age: 74
Discharge: HOME | End: 2024-03-06
Attending: INTERNAL MEDICINE
Payer: MEDICARE

## 2024-03-06 ENCOUNTER — HOSPITAL ENCOUNTER (OUTPATIENT)
Dept: CARDIOLOGY | Facility: HOSPITAL | Age: 74
Discharge: HOME | End: 2024-03-06
Attending: INTERNAL MEDICINE
Payer: MEDICARE

## 2024-03-06 VITALS
SYSTOLIC BLOOD PRESSURE: 160 MMHG | DIASTOLIC BLOOD PRESSURE: 69 MMHG | WEIGHT: 157 LBS | BODY MASS INDEX: 26.16 KG/M2 | HEIGHT: 65 IN

## 2024-03-06 DIAGNOSIS — D89.89 ANTISYNTHETASE SYNDROME: ICD-10-CM

## 2024-03-06 LAB
AV INDEX (PROSTH): 0.47
AV MEAN GRADIENT: 9 MMHG
AV PEAK GRADIENT: 15 MMHG
AV REGURGITATION PRESSURE HALF TIME: 413.45 MS
AV VALVE AREA BY VELOCITY RATIO: 1.55 CM²
AV VALVE AREA: 1.55 CM²
AV VELOCITY RATIO: 0.47
BSA FOR ECHO PROCEDURE: 1.81 M2
CV ECHO LV RWT: 0.46 CM
DOP CALC AO PEAK VEL: 1.96 M/S
DOP CALC AO VTI: 45.4 CM
DOP CALC LVOT AREA: 3.3 CM2
DOP CALC LVOT DIAMETER: 2.05 CM
DOP CALC LVOT PEAK VEL: 0.92 M/S
DOP CALC LVOT STROKE VOLUME: 70.27 CM3
DOP CALC MV VTI: 32.1 CM
DOP CALCLVOT PEAK VEL VTI: 21.3 CM
E WAVE DECELERATION TIME: 226.98 MSEC
E/A RATIO: 0.81
E/E' RATIO: 10.67 M/S
ECHO LV POSTERIOR WALL: 1.08 CM (ref 0.6–1.1)
FRACTIONAL SHORTENING: 32 % (ref 28–44)
HR MV ECHO: 69 BPM
INTERVENTRICULAR SEPTUM: 1.03 CM (ref 0.6–1.1)
IVC DIAMETER: 1.3 CM
LEFT ATRIUM SIZE: 3.52 CM
LEFT ATRIUM VOLUME INDEX MOD: 27 ML/M2
LEFT ATRIUM VOLUME MOD: 48 CM3
LEFT INTERNAL DIMENSION IN SYSTOLE: 3.22 CM (ref 2.1–4)
LEFT VENTRICLE DIASTOLIC VOLUME INDEX: 58.7 ML/M2
LEFT VENTRICLE DIASTOLIC VOLUME: 104.49 ML
LEFT VENTRICLE MASS INDEX: 101 G/M2
LEFT VENTRICLE SYSTOLIC VOLUME INDEX: 23.4 ML/M2
LEFT VENTRICLE SYSTOLIC VOLUME: 41.73 ML
LEFT VENTRICULAR INTERNAL DIMENSION IN DIASTOLE: 4.74 CM (ref 3.5–6)
LEFT VENTRICULAR MASS: 179.42 G
LV LATERAL E/E' RATIO: 9.6 M/S
LV SEPTAL E/E' RATIO: 12 M/S
LVOT MG: 2.03 MMHG
LVOT MV: 0.67 CM/S
MV MEAN GRADIENT: 1 MMHG
MV PEAK A VEL: 1.19 M/S
MV PEAK E VEL: 0.96 M/S
MV PEAK GRADIENT: 3 MMHG
MV STENOSIS PRESSURE HALF TIME: 65.83 MS
MV VALVE AREA BY CONTINUITY EQUATION: 2.19 CM2
MV VALVE AREA P 1/2 METHOD: 3.34 CM2
OHS LV EJECTION FRACTION SIMPSONS BIPLANE MOD: 66 %
PISA AR MAX VEL: 3 M/S
PISA MRMAX VEL: 5.75 M/S
PISA TR MAX VEL: 2.8 M/S
RA MAJOR: 4.63 CM
RA PRESSURE ESTIMATED: 3 MMHG
RV TB RVSP: 6 MMHG
SINUS: 2.5 CM
TDI LATERAL: 0.1 M/S
TDI SEPTAL: 0.08 M/S
TDI: 0.09 M/S
TR MAX PG: 31 MMHG
TRICUSPID ANNULAR PLANE SYSTOLIC EXCURSION: 1.82 CM
TV REST PULMONARY ARTERY PRESSURE: 34 MMHG
Z-SCORE OF LEFT VENTRICULAR DIMENSION IN END DIASTOLE: -0.37
Z-SCORE OF LEFT VENTRICULAR DIMENSION IN END SYSTOLE: 0.45

## 2024-03-06 PROCEDURE — 71250 CT THORAX DX C-: CPT | Mod: TC

## 2024-03-06 PROCEDURE — 93306 TTE W/DOPPLER COMPLETE: CPT

## 2024-03-25 ENCOUNTER — TELEPHONE (OUTPATIENT)
Dept: RHEUMATOLOGY | Facility: CLINIC | Age: 74
End: 2024-03-25
Payer: MEDICARE

## 2024-03-25 NOTE — TELEPHONE ENCOUNTER
----- Message from Rad Manzanares MD sent at 2/29/2024 10:30 AM CST -----  Labs in 4 weeks, see TE.

## 2024-03-25 NOTE — TELEPHONE ENCOUNTER
Pt notified pt states she started the medication on 3/3/2024 and she will come in on the first week of April as she is out of town.

## 2024-04-04 ENCOUNTER — LAB VISIT (OUTPATIENT)
Dept: LAB | Facility: HOSPITAL | Age: 74
End: 2024-04-04
Attending: INTERNAL MEDICINE
Payer: MEDICARE

## 2024-04-04 DIAGNOSIS — D89.89 ANTISYNTHETASE SYNDROME: ICD-10-CM

## 2024-04-04 LAB
ALBUMIN SERPL-MCNC: 3.6 G/DL (ref 3.4–4.8)
ALBUMIN/GLOB SERPL: 0.9 RATIO (ref 1.1–2)
ALP SERPL-CCNC: 69 UNIT/L (ref 40–150)
ALT SERPL-CCNC: 12 UNIT/L (ref 0–55)
AST SERPL-CCNC: 16 UNIT/L (ref 5–34)
BASOPHILS # BLD AUTO: 0.07 X10(3)/MCL
BASOPHILS NFR BLD AUTO: 0.7 %
BILIRUB SERPL-MCNC: 0.3 MG/DL
BUN SERPL-MCNC: 8.6 MG/DL (ref 9.8–20.1)
CALCIUM SERPL-MCNC: 10.1 MG/DL (ref 8.4–10.2)
CHLORIDE SERPL-SCNC: 108 MMOL/L (ref 98–107)
CK SERPL-CCNC: 87 U/L (ref 29–168)
CO2 SERPL-SCNC: 30 MMOL/L (ref 23–31)
CREAT SERPL-MCNC: 0.81 MG/DL (ref 0.55–1.02)
EOSINOPHIL # BLD AUTO: 0.14 X10(3)/MCL (ref 0–0.9)
EOSINOPHIL NFR BLD AUTO: 1.3 %
ERYTHROCYTE [DISTWIDTH] IN BLOOD BY AUTOMATED COUNT: 16.6 % (ref 11.5–17)
GFR SERPLBLD CREATININE-BSD FMLA CKD-EPI: >60 MLS/MIN/1.73/M2
GLOBULIN SER-MCNC: 3.8 GM/DL (ref 2.4–3.5)
GLUCOSE SERPL-MCNC: 97 MG/DL (ref 82–115)
HCT VFR BLD AUTO: 39.9 % (ref 37–47)
HGB BLD-MCNC: 11.9 G/DL (ref 12–16)
IMM GRANULOCYTES # BLD AUTO: 0.05 X10(3)/MCL (ref 0–0.04)
IMM GRANULOCYTES NFR BLD AUTO: 0.5 %
LYMPHOCYTES # BLD AUTO: 2.53 X10(3)/MCL (ref 0.6–4.6)
LYMPHOCYTES NFR BLD AUTO: 24.4 %
MCH RBC QN AUTO: 24.9 PG (ref 27–31)
MCHC RBC AUTO-ENTMCNC: 29.8 G/DL (ref 33–36)
MCV RBC AUTO: 83.6 FL (ref 80–94)
MONOCYTES # BLD AUTO: 0.97 X10(3)/MCL (ref 0.1–1.3)
MONOCYTES NFR BLD AUTO: 9.3 %
NEUTROPHILS # BLD AUTO: 6.62 X10(3)/MCL (ref 2.1–9.2)
NEUTROPHILS NFR BLD AUTO: 63.8 %
NRBC BLD AUTO-RTO: 0 %
PLATELET # BLD AUTO: 286 X10(3)/MCL (ref 130–400)
PMV BLD AUTO: 9.7 FL (ref 7.4–10.4)
POTASSIUM SERPL-SCNC: 4.2 MMOL/L (ref 3.5–5.1)
PROT SERPL-MCNC: 7.4 GM/DL (ref 5.8–7.6)
RBC # BLD AUTO: 4.77 X10(6)/MCL (ref 4.2–5.4)
SODIUM SERPL-SCNC: 144 MMOL/L (ref 136–145)
WBC # SPEC AUTO: 10.38 X10(3)/MCL (ref 4.5–11.5)

## 2024-04-04 PROCEDURE — 85025 COMPLETE CBC W/AUTO DIFF WBC: CPT

## 2024-04-04 PROCEDURE — 82085 ASSAY OF ALDOLASE: CPT

## 2024-04-04 PROCEDURE — 80053 COMPREHEN METABOLIC PANEL: CPT

## 2024-04-04 PROCEDURE — 82550 ASSAY OF CK (CPK): CPT

## 2024-04-04 PROCEDURE — 36415 COLL VENOUS BLD VENIPUNCTURE: CPT

## 2024-04-05 LAB — ALDOLASE SERPL-CCNC: 4.2 U/L

## 2024-04-11 ENCOUNTER — TELEPHONE (OUTPATIENT)
Dept: RHEUMATOLOGY | Facility: CLINIC | Age: 74
End: 2024-04-11
Payer: MEDICARE

## 2024-04-11 NOTE — TELEPHONE ENCOUNTER
Pt missed her appt scheduled 4/10/2024 due to weather. She has been taking her prograf under her tongue so she wants to know if this is ok?

## 2024-04-29 NOTE — PROGRESS NOTES
Patient ID: [id number]     Chief Complaint: Antisynthetase syndrome (Pt stated has burning in throat after taking Prograft and having 7 BM a day denies diarrhea stated stool is soft with mucus . Also stated dropping objects in uri hands  and dizziness )      HPI:     Devi Fernandes is a 73 y.o. 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       She was diagnosed with Lupus in 2008. She had seen Rheumatologist in [place] in the past, does not remember the name. She was treated with prednisone. She was treated with some medications back then, she does not remember the name on medications. She may have been on lmuran, methotrexate and hydroxychloroquine in the past. Used higher dose of prednisone in the past. She was on prednisone daily in the past.     She was admitted at [facility] in [facility] in April 2023, shows admitted for Rhabdomyolysis due to COVID, she was treated with steroids.  Plan to resume CellCept and prednisone after completion of Decadron therapy. Reviewed discharge summary.     She had fractured left 4th metatarsal in winter 2022, when she tripped while getting out of car wearing tight heels with straps.   She has dysphagia, cannot take tablets, she is  taking liquid CellCept, 1500 mg twice daily. Tolerating it well.  She had flare of anti synthetase syndrome in 8/2022, added IVIG. She took IVIG for few months and stopped in November 2022, she does not like to get those infusions, she does not find them helpful(even though flare was improved with it, CPK normalized) she does not like to continue it and said she will consider it if she flares again.   Currently taking prednisone 5 mg daily. Dr Llanes had discussed OFEV, she has not started it yet because of dysphagia, she can not swallow capsules. Had seen Neurology and ENT, Dr Miranda for dysphagia and dysphonia.   She has joint pain in knees and other joints. Also has pain in fingers. No aggravating factors. Knee pain better  with rest. Hard for her to open jars. Morning stiffness for 5 min. Denies red, warm and swollen joints.   Admits swallowing issues since 2017. She has to swallow small pieces. Swallowing issues getting worse. She chokes on solids and liquids. Food does not get stuck in chest intermittently. Symptoms are stable.   Admits chronic shortness of breath. Denies productive cough. Patient thinks SOB slightly worse than before.  Shortness of breath worse with walking but denies shortness of breath when she does aerobic exercise.  Admits color changes in hands for the last 15 years, since 2007. Cold is trigger. She will have when its cold, could be twice month or every day if its cold. Episode lasts for 15 min. Fingers never turn white, always purple in tips.   She had surgery for vocal cord polyps in 2017 and since then she has dysphonia. Voice issues are same since 2017, not getting worse. She follows with Dr Miranda.     April 2024: Here today for follow up. She has continued liquid CellCept, 1500 mg twice daily, she was started on Prograf in March 2024- has been taking as directed but having issues with burning when she swallows (opening the capsules and taking under her tongue) as well as up to 7 soft stools a day and some dizziness since starting. She denies any weakness at this time. She does occ drop items when grasping in her hands- no worsening of symptoms. Continues with dysphagia and dysphonia- last seen by Dr. Miranda, ENT last year. She also continues with SOB- has been stable, no worsening of symptoms- she has an upcoming apt with Dr. Llanes next week for PFTs then has visit the following week. She has also continued Prednisone 20 mg po qd since February 2024.     Denies any recent illness or hospitalizations since last visit.     Denies fevers, oral or nasal ulcers, rashes, photosensitivity, history of DVT or PE, history of stroke or seizures, history of Ml, history of inflammatory eye diseases, history of  malignancy.   Autoimmune diseases in family: none.   Miscarriages: 2, first trimester miscarriages. Pregnancies: 3   Smoking: nonsmoker.     Social History     Tobacco Use   Smoking Status Never   Smokeless Tobacco Never          -------------------------------------    Allergy    Aortic regurgitation    Arthritis    BMI 22.0-22.9, adult    Deviated nasal septum    Diabetes mellitus    Dysphagia    GERD (gastroesophageal reflux disease)    Hypertension    PAOLA (obstructive sleep apnea)    Pulmonary fibrosis    Pulmonary hypertension    Pulmonic regurgitation    Restrictive lung disease    Sinus bradycardia    Valvular heart disease        Past Surgical History:   Procedure Laterality Date    APPENDECTOMY      CARDIAC CATHETERIZATION  2017     SECTION      COLONOSCOPY  2011    ESOPHAGEAL MANOMETRY  2018    EYE SURGERY  2021    Cataract surgery    GASTROSCOPY  02/15/2018    HYSTERECTOMY  2003    Vocal Cord Surgery  2017    cyst removed       Review of patient's allergies indicates:   Allergen Reactions    Codeine Other (See Comments)     Becomes weak and over heated.   Other reaction(s): unknown  Hot flashes       Current Outpatient Medications   Medication Sig Dispense Refill    amLODIPine (NORVASC) 5 MG tablet Take 1 tablet by mouth once daily.      biotin 5,000 mcg Chew Take 1 tablet by mouth Daily. Pt stated doesn't take it regularly      carvediloL (COREG) 3.125 MG tablet Take 1 tablet twice a day by oral route.      folic acid (FOLVITE) 1 MG tablet Take 1 mg by mouth once daily.      ipratropium (ATROVENT) 42 mcg (0.06 %) nasal spray 2 sprays 2 (two) times daily as needed.      metoprolol tartrate (LOPRESSOR) 25 MG tablet TAKE ONE TABLET BY MOUTH TWO TIMES A DAY DISCONTINUE COREG      mycophenolate mofetil (CELLCEPT) 200 mg/mL SusR Take 7.5 mLs (1,500 mg total) by mouth 2 (two) times a day. 450 mL 3    pantoprazole (PROTONIX) 20 MG tablet Take 1 tablet (20 mg total) by mouth 2 (two)  times daily. Take 20 mg by mouth 2 (two) times daily. 180 tablet 3    predniSONE (DELTASONE) 10 MG tablet Take 2 tablets (20 mg total) by mouth once daily. 60 tablet 1    propylene glycol (SYSTANE BALANCE OPHT) Apply 1 drop to eye daily as needed.      tacrolimus (PROGRAF) 1 MG Cap Take 1 capsule (1 mg total) by mouth every 12 (twelve) hours. 60 capsule 1    triamcinolone (NASACORT) 55 mcg nasal inhaler 2 sprays by Nasal route once daily.       No current facility-administered medications for this visit.       Social History     Socioeconomic History    Marital status:    Tobacco Use    Smoking status: Never    Smokeless tobacco: Never   Substance and Sexual Activity    Alcohol use: No    Drug use: No    Sexual activity: Not Currently     Partners: Female     Birth control/protection: Abstinence     Social Determinants of Health     Financial Resource Strain: Low Risk  (4/19/2023)    [facility]    Overall Financial Resource Strain (CARDIA)     Difficulty of Paying Living Expenses: Not hard at all   Food Insecurity: No Food Insecurity (4/19/2023)    Received from [facility], [facility]    Hunger Vital Sign     Worried About Running Out of Food in the Last Year: Never true     Ran Out of Food in the Last Year: Never true   Transportation Needs: No Transportation Needs (4/19/2023)    Received from [facility], [facility]    PRAPARE - Transportation     Lack of Transportation (Medical): No     Lack of Transportation (Non-Medical): No        Family History   Problem Relation Name Age of Onset    Cancer Mother Mrs. Sade Fernandes     Diabetes Mellitus Father Lanre Fernandes, Sr.     Diabetes Father Lanre Fernandes, Sr.     Vision loss Father Lanre Fernandes, Sr.     Cancer Sister      Breast cancer Sister  66        Left breast        Immunization History   Administered Date(s)  Administered    COVID-19, MRNA, LN-S, PF (MODERNA FULL 0.5 ML DOSE) 10/30/2021, 04/12/2022, 08/06/2022    Influenza (FLUAD) - Quadrivalent - Adjuvanted - PF *Preferred* (65+) 11/03/2022, 10/06/2023    Influenza (FLUAD) - Trivalent - Adjuvanted - PF (65+) 10/10/2018    Influenza - High Dose - PF (65 years and older) 12/04/2017, 09/17/2019, 09/30/2020, 10/21/2021    Influenza - Trivalent (ADULT) 09/30/2020    Pneumococcal Conjugate - 13 Valent 02/06/2019    Pneumococcal Polysaccharide - 23 Valent 11/03/2022    RSVpreF (Arexvy) 11/06/2023    Td (Adult), Unspecified Formulation 10/21/2020    Tdap 02/06/2019    Zoster Recombinant 11/03/2022, 05/09/2023       Patient Care Team:  Ariana Grewal MD as PCP - General (Internal Medicine)  Mindy Aguilar MD as Consulting Physician (Gastroenterology)     Subjective:       Constitutional:  Denies chills. Denies fever. Denies night sweats. Denies weight loss.   Ophthalmology: Denies blurred vision. Denies dry eyes. Denies eye pain. Denies Itching and redness.   ENT: Denies oral ulcers. Denies epistaxis. Denies dry mouth. Denies swollen glands.   Endocrine: Denies diabetes. Denies thyroid Problems.   Respiratory: Denies cough. Denies shortness of breath. Admits shortness of breath with exertion. Denies hemoptysis.   Cardiovascular: Denies chest pain at rest. Denies chest pain with exertion. Denies palpitations- has resolved with med increase by Cardiology  Gastrointestinal: Denies abdominal pain. Admits diarrhea with new medication. Denies nausea. Denies vomiting. Denies hematemesis or hematochezia. Denies heartburn.  Genitourinary: Denies blood in urine.  Musculoskeletal: See HPI for details  Integumentary: Denies rash. Denies photosensitivity.   Peripheral Vascular: Denies Ulcers of hands and/or feet. Denies Cold extremities.   Neurologic: Denies dizziness. Denies headache.  Denies loss of strength. Denies numbness or tingling.   Psychiatric: Denies depression. Denies  "anxiety. Denies suicidal/homicidal ideations.      Objective:     /68 (BP Location: Left arm, Patient Position: Sitting, BP Method: Medium (Automatic))   Pulse 74   Temp 98.2 °F (36.8 °C) (Oral)   Resp 20   Ht 5' 5" (1.651 m)   Wt 72.8 kg (160 lb 7.9 oz)   SpO2 99%   BMI 26.71 kg/m²     Physical Exam    General Appearance: alert, pleasant, in no acute distress.  Skin: Skin color, texture, turgor normal. No rashes or lesions. Few capillary dilatations, no drop outs.   Eyes:  extraocular movement intact (EOMI), pupils equal, round, reactive to light and accommodation, conjunctiva clear.  ENT: No oral or nasal ulcers.  Neck:  Neck supple. No adenopathy.   Lungs: fine crackles in bases, clear through out.   Heart: RRR.  No edema. Soft systolic murmur present.   Neuro: Alert, oriented, CN II-XII GI, sensory and motor innervation intact.  Psych: Alert, oriented, normal eye contact.    Joint Exam:  DIPs, PIPs, MCPs: no pain on palpation, no swelling or redness, no nodules. DJD of bilateral hands.   Wrists: no pain on palpation, no swelling or redness, good range of motion.  Elbows: no pain on palpation, no swelling or redness, good range of motion, no nodules.  Feet: no pain on palpation, no swelling or redness, no nodules.    Labs:   August 2021: CMP okay. CBC okay. SPEP showed polyclonal gammopathy and elevated beta proteins. 2/18/2022 :ESR, CRP normal. CBC okay. CMP okay. Globulin and protein slightly high. CPK normal.  mg/g. Rheumatoid factor and anti CCP negative. Positive RNP antibody, 4.6. Positive SSA, 2.7. Negative SCL 70, SSB, Smith, dsDNA, thyroglobulin, TPO, RNA polymerase 3, centromere antibodies. Aldolase normal. ANCA, MPO and NC-3 negative. Cardiolipin, beta-2 glycoprotein negative. LAC negative. Positive ISMAEL, nuclear, cytoplasmic and homogeneous pattern, 1:320. Positive myositis antibody, EJ 78, high titer, PL? is 12, Ml-2 is 17. C3, C4 normal. No blood and protein in urine.   3/24/22: " Hep B, C, HIV, quantiferon tb negative.   5/3/2022 RDW elevated 15.0. CBC okay. Glucose 129. CMP okay. CPK and aldolase normal. ESR 24, CRP normal. TPMT low, low metaboilizer.   6/10/2022: CMP okay. LFTs normal. CPK and aldoalse normal. Hemoglobin 11.8. ESR 45, normal less than 30. CRP normal. No blood and protein in urine. C3, C4 okay.  mg/g.   2022- during hospitalization: AST elevated 72, otherwise CMP okay. CPK greater than 2000. Elevated WBC count, otherwise CBC okay. Small protein and no blood in urine.  2022: C3, C4 okay. Urine protein less than 15 mg/dL. No blood and protein in urine. Small leukocyte esterase. WBC count elevated secondary to steroids. WBC count 13.4, hemoglobin 11.5. Bicarb slightly elevated. CMP okay otherwise. CPK elevated 2076. ESR 40, CRP normal. Aldolase 28.   23: CMP ok. ESR 55, CRP normal. Hg 11.7. C3, C4 normal. CPK and aldolase normal.  3/27/23:  UA, upc okay.  CMP okay.  ESR 32.  WBC count 11.8.  CBC okay otherwise.  C3, C4 okay.  CRP normal.  Aldolase and CPK normal.  23:  WBC count elevated 15.3.  Hemoglobin 11.0.  AST 49, ALC 76, upper limits of normal.  Calcium 8.3.  GFR normal.  CPK elevated 1096.  BNP normal.  No protein and blood in urine.  Labs done by her PCP, Dr. Rebollar.  2023; trace protein and no blood in urine.  Upc okay.  CMP okay.  ESR 22.  Hemoglobin 11.0.  CBC okay otherwise.  C3, C4 normal.  CPK and aldolase normal.  CRP normal.    23:  CPK normal.  UPC okay.  Trace protein and no blood in urine urine.  CRP 14.6.  CMP okay.  ESR 33.  Hemoglobin 11.9.  Aldolase normal.  C3, C4 okay    24: Hg 11.1.  AST 42.  CPK 7,946. Aldolase 44.    4/4/24:  CMP okay.  Hemoglobin 11.9.  CPK normal. Aldolase normal    Imagin2021: CT neck soft tissue: no soft tissue abnormalities. Of cervical spine. No enlarged lymph nodes. Thyroid normal.   Retropharyngeal space unremarkable. Normal larynx.   Swallowing study: 10/2021: No  penetration or aspiration. Speech pathology evaluation showed moderate to severe oropharyngeal dysphagia, lingual weakness, reduced range of motion and coordination, pharyngeal delay, refused hypo laryngeal elevation, reduced epiglottic inversion and pharyngeal squeeze. Recommend softer fine chopped meats thin liquids, no straws. Crushed meds in puree consistency. Aspiration precautions. ENT and neurological evaluation is recommended.     02/18/22: Chest x-ray showed coarse reticular opacities in the mid and lower lungs, unchanged from October 2021. Calcaneal spurring bilaterally, no erosions. Normal x-ray of bilateral knees. Normal x ray of bilateral hands, no erosions.     ECHO:  Echocardiogram 9/8/2021: Concentric left ventricular hypertrophy. Diastolic dysfunction. Mild aortic insufficiency. Moderate pulmonary hypertension. EF 55 to 60%. PASP 41 mmHg.   03/15/2023:  Echocardiogram 08/30/2022 showed normal systolic function, EF 60-65%, grade 1 diastolic dysfunction, PASP 34 mmHg.   Echocardiogram 03/06/2024 ejection fraction 66%, grade 1 diastolic dysfunction.  PASP 34 mmHg.  No pericardial effusion.    CT CHEST:   3/16/2022 :CT chest showed old granulomatous disease. Predominantly bibasilar interstitial pulmonary fibrosis. Minimal peripheraI honeycombing. Extensive reticuIonoduIar Iung changes and small calcified lung nodules. Bronchiectasis present. Slightly enlarged mediastinal lymph nodes.   CT chest on 03/15/2023 showed stable pulmonary fibrosis with peripheral honeycombing, bronchiectasis, old granulomatous disease, stable right lateral chest subpleural dense opacity associated with posterior fissures.  Fine reticular lung markings with ground-glass opacity and ground-glass density.  Stable small right upper lobes pleural nodule.    12/15/2023: CT chest showed old granulomatous disease.  Stable predominant basilar interstitial pulmonary fibrosis peripheral honeycombing.  Left upper lobe subpleural reticular  markings, extensive left lower lobe reticulonodular changes, extensive right middle and right lower lobe nodular changes.  Course right upper subpleural lung markings.  CT chest 03/12/2024: bibasilar bronchiectatic changes, septal thickening and ground-glass opacities bilaterally.  Nurse small hyperdense foci in the base of lung which may be related to aspirated barium.  No honeycombing, no air trapping    PFTs:  10/28/2021: PFTs: FVC 71%, FEV1 83%, ratio 91%, total lung capacity 77%, DLCO 22%, DLCO/VA 44%.  2/14/23: PFTs: FVC 75%, FEV1 84%, ratio 86, total lung capacity 78%, DLCO 26%, DLCO/VA 52%.  09/18/2023 PFTs showed FVC 75%, FEV1 89%, ratio 92, total lung capacity 63%, residual volume 56, DLCO 19, DLCO/VA 39.    Previous rheumatology records from [place]: Dr. Mynor Delarosa: Clinic visit in October 2020: Diagnosed with lupus, pulmonary fibrosis, pulmonary hypertension. She was treated with methotrexate and Plaquenil in the past. Patient has stable and ongoing shortness of PFTs showed markedly low DLCO. Off of her medications currently. Labs unremarkable. Need to order high resolution. Nonproductive cough. Muscle strength normal. Chest x ray October 2020. Dense reticulonodular fibrosis at the lung bases.       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 ILD (interstitial lung disease)  J84.9 515   3. Mixed connective tissue disease  M35.1 710.8   4. Dysphonia  R49.0 784.42   5. Other dysphagia  R13.19 787.29   6. Pulmonary hypertension, unspecified  I27.20 416.8   7. Drug-induced immunodeficiency  D84.821 279.3    Z79.899 E947.9   8. High risk medication use  Z79.899 V58.69   9. Postmenopausal  Z78.0 V49.81          Plan:     73-year-old pleasant -American woman presents for follow up of lupus, rheumatoid arthritis, mixed connective tissue disease, interstitial lung disease, vocal cord dysfunction, dysphagia here for follow up of antisynthetase syndrome.     1.  Antisynthetase syndrome: Positive myositis antibody, EJ 78, high titer, PL7 is 12, Ml-2 is 17. Positive myositis panel, questionable Raynaud's phenomenon, dysphagia, dysphonia. Intermittent elevation of CPK, had flare in 8/2022 and again most recently 2/2024. Discussed the disease course and treatment options of antisynthetase syndrome. Positive SSA ab associated with ILD and ATS, poor prognosis.  - Started IVIG infusions as she has hoarseness of voice and dysphagia with elevated CPK. Recieved first dose of IVIG 2 g/kg, 130 mg total dose on 9/6/22. Tolerated IVIG well. She had received few more doses and stopped in 12/2022. She does not like to take infusions and will consider in the future if she flares again, refusing to take it currently.  - Muscle enzymes had been stable, decreased prednisone dose to 5 mg daily at previous visit, however after lab work 2/2024 noted CPK 7,946. Aldolase 44.- she was started on Prograf 1 mg bid (was opening capsules and putting under her tongue as directed)- today she reports some dizziness, loose stools up to 7 times a day and some burning when she takes medication.   - C/w CellCept 1500 mg twice daily, 7.5 ml BID.    - D/C Prograf- we will check a TPMT today and if ok start low dose Imuran 50 mg po qd (discussed with Dr. Manzanares)  - Start Prednisone taper- she will decrease dose to 15 mg po qd x 2 weeks then 10 mg po qd x 2 weeks then 7.5 mg po qd- continue this dose  - PFT's stable in 2/2023, CT chest stable in 3/2023.  - Mild decline in DLCO in 9/2023, repeat PFT's are scheduled for 5/2024 with Dr. Llanes  - ECHO stable 3/2024  - Lab today for continued monitoring and TPMT     2. Color changes in hands: Does not have typical color changes of Raynaud's phenomenon, saw pictures in her phone, hands turn dark when its cold. Few dilated capillary nail folds, no dropouts on exam.      3. ILD (interstitial lung disease): Interstitial lung disease: DLCO low, 22% in October 2021. CT chest  showed bibasilar reticulonodular changes and mild honeycombing in March 2022. Admits chronic cough, SOB with exertion. She was referred to pulmonary, she had seen Dr Llanes. Continue close follow-up with Dr. Llanes. OFEV was discussed but never started it, due to swallowing issues.   - Requested and reviewed records from Pulmonary, clinic visit on 03/15/2023:  Echocardiogram 08/30/2022 showed normal systolic function, EF 60-65%, grade 1 diastolic dysfunction, PASP 34 mmHg.  CT chest on 03/15/2023 showed stable pulmonary fibrosis with peripheral honeycombing, bronchiectasis, old granulomatous disease, stable right lateral chest subpleural dense opacity associated with posterior fissures.  Fine reticular lung markings with ground-glass opacity and ground-glass density.  Stable small right upper lobes pleural nodule. Recommend 2.5 L of oxygen at night.    - Will continue to monitor.  - OFEV was prescribed but she can not tolerate it because of dysphagia, can not swallow capsules.  09/18/2023 PFTs showed FVC 75%, FEV1 89%, ratio 92, total lung capacity 63%, residual volume 56, DLCO 19, DLCO/VA 39.   - Rx plan as above.   - Keep apt with Dr. Llanes next week      4. Dysphonia/dysphagia: Dysphonia is stable. Dysphagia is getting worse. Dysphagia and dysphonia secondary to antisynthetase syndrome. Swallow evaluation showed severe oropharyngeal dysphagia. Recommend ENT and neurological evaluation. She had seen neurologist Dr Francine Goss and ENT Dr Miranda.   - Advised to follow up with ENT.     5. Mixed connective tissue disease: She was treated with lmuran, methotrexate and Plaquenil in the past. Positive ISMAEL, nuclear, cytoplasmic and homogeneous pattern, 1:320. Positive RNP antibody, 4.6. Positive SSA, 2.7. Positive ISMAEL and SSA antibody likely associated with antisynthetase syndrome. She has features of mixed connective tissue disease and antisynthetase syndrome.   - Check complements and UA every 6 months.      7. Heart  murmur, history of heart issues/Pulmonary HTN: Requested and reviewed records from cardiology Dr Warren. Echocardiogram in September 2021 showed diastolic dysfunction, normal systolic function and elevated PASP 41 mmHg. Continue close follow-up with Cardiology and pulmonary.      8. High risk medication use/Drug Induced Immunodeficency: Persons with rheumatoid arthritis, lupus, psoriatic arthritis and other autoimmune diseases are at increased risk of cardiovascular disease including heart attack and stroke. We recommend that all patients with these conditions have annual health maintenance exams including lipid measurements, blood pressure measurements, and smoking cessation counseling when applicable at their primary care provider's office.   -Mammogram UTD, PAPs completed due to age, Colonscopy scheduled for 11/2024  - Advised to stay up-to-date on age appropriate vaccinations and malignancy screening, including yearly skin exams.    - Reviewed uptodate on vaccines, zoster, pneumovax, reviewed. Received RSV vaccine.      9. Bone health/Postmenopausal: Advised to take multivitamin and calcium supplementations. She had DXA done in 8/2021, Dr Rebollar monitoring it. Continue follow up with Dr Rebollar for screening of osteoporosis.         No follow-ups on file. In addition to their scheduled follow up, the patient has also been instructed to follow up on as needed basis.      Total time spent with patient and documentation is 50 minutes. All questions were answered to patient's satisfaction and patient verbalized understanding. This includes face to face time and non-face to face time preparing to see the patient (eg, review of tests), obtaining and/or reviewing separately obtained history, documenting clinical information in the electronic or other health record, independently interpreting results and communicating results to the patient/family/caregiver, or care coordinator.

## 2024-04-30 ENCOUNTER — OFFICE VISIT (OUTPATIENT)
Dept: RHEUMATOLOGY | Facility: CLINIC | Age: 74
End: 2024-04-30
Payer: MEDICARE

## 2024-04-30 ENCOUNTER — LAB VISIT (OUTPATIENT)
Dept: LAB | Facility: HOSPITAL | Age: 74
End: 2024-04-30
Attending: NURSE PRACTITIONER
Payer: MEDICARE

## 2024-04-30 VITALS
BODY MASS INDEX: 26.74 KG/M2 | HEART RATE: 74 BPM | DIASTOLIC BLOOD PRESSURE: 68 MMHG | TEMPERATURE: 98 F | OXYGEN SATURATION: 99 % | HEIGHT: 65 IN | RESPIRATION RATE: 20 BRPM | SYSTOLIC BLOOD PRESSURE: 126 MMHG | WEIGHT: 160.5 LBS

## 2024-04-30 DIAGNOSIS — Z79.899 DRUG-INDUCED IMMUNODEFICIENCY: ICD-10-CM

## 2024-04-30 DIAGNOSIS — Z79.899 HIGH RISK MEDICATION USE: ICD-10-CM

## 2024-04-30 DIAGNOSIS — D89.89 ANTISYNTHETASE SYNDROME: ICD-10-CM

## 2024-04-30 DIAGNOSIS — D84.821 DRUG-INDUCED IMMUNODEFICIENCY: ICD-10-CM

## 2024-04-30 DIAGNOSIS — I27.20 PULMONARY HYPERTENSION, UNSPECIFIED: ICD-10-CM

## 2024-04-30 DIAGNOSIS — M35.1 MIXED CONNECTIVE TISSUE DISEASE: ICD-10-CM

## 2024-04-30 DIAGNOSIS — R13.19 OTHER DYSPHAGIA: ICD-10-CM

## 2024-04-30 DIAGNOSIS — D89.89 ANTISYNTHETASE SYNDROME: Primary | ICD-10-CM

## 2024-04-30 DIAGNOSIS — R49.0 DYSPHONIA: ICD-10-CM

## 2024-04-30 DIAGNOSIS — Z78.0 POSTMENOPAUSAL: ICD-10-CM

## 2024-04-30 DIAGNOSIS — J84.9 ILD (INTERSTITIAL LUNG DISEASE): ICD-10-CM

## 2024-04-30 LAB
ALBUMIN SERPL-MCNC: 3.5 G/DL (ref 3.4–4.8)
ALBUMIN/GLOB SERPL: 0.9 RATIO (ref 1.1–2)
ALP SERPL-CCNC: 67 UNIT/L (ref 40–150)
ALT SERPL-CCNC: 25 UNIT/L (ref 0–55)
AST SERPL-CCNC: 19 UNIT/L (ref 5–34)
BASOPHILS # BLD AUTO: 0.05 X10(3)/MCL
BASOPHILS NFR BLD AUTO: 0.3 %
BILIRUB SERPL-MCNC: 0.3 MG/DL
BUN SERPL-MCNC: 9.7 MG/DL (ref 9.8–20.1)
C3 SERPL-MCNC: 147 MG/DL (ref 80–173)
C4 SERPL-MCNC: 34 MG/DL (ref 13–46)
CALCIUM SERPL-MCNC: 9.7 MG/DL (ref 8.4–10.2)
CHLORIDE SERPL-SCNC: 106 MMOL/L (ref 98–107)
CK SERPL-CCNC: 68 U/L (ref 29–168)
CO2 SERPL-SCNC: 28 MMOL/L (ref 23–31)
CREAT SERPL-MCNC: 0.8 MG/DL (ref 0.55–1.02)
CRP SERPL-MCNC: 7.4 MG/L
EOSINOPHIL # BLD AUTO: 0.07 X10(3)/MCL (ref 0–0.9)
EOSINOPHIL NFR BLD AUTO: 0.4 %
ERYTHROCYTE [DISTWIDTH] IN BLOOD BY AUTOMATED COUNT: 16.5 % (ref 11.5–17)
ERYTHROCYTE [SEDIMENTATION RATE] IN BLOOD: 55 MM/HR (ref 0–20)
GFR SERPLBLD CREATININE-BSD FMLA CKD-EPI: >60 MLS/MIN/1.73/M2
GLOBULIN SER-MCNC: 3.8 GM/DL (ref 2.4–3.5)
GLUCOSE SERPL-MCNC: 92 MG/DL (ref 82–115)
HCT VFR BLD AUTO: 38.1 % (ref 37–47)
HGB BLD-MCNC: 11.2 G/DL (ref 12–16)
IMM GRANULOCYTES # BLD AUTO: 0.09 X10(3)/MCL (ref 0–0.04)
IMM GRANULOCYTES NFR BLD AUTO: 0.5 %
LYMPHOCYTES # BLD AUTO: 1.92 X10(3)/MCL (ref 0.6–4.6)
LYMPHOCYTES NFR BLD AUTO: 11.5 %
MCH RBC QN AUTO: 24.5 PG (ref 27–31)
MCHC RBC AUTO-ENTMCNC: 29.4 G/DL (ref 33–36)
MCV RBC AUTO: 83.4 FL (ref 80–94)
MONOCYTES # BLD AUTO: 1.32 X10(3)/MCL (ref 0.1–1.3)
MONOCYTES NFR BLD AUTO: 7.9 %
NEUTROPHILS # BLD AUTO: 13.21 X10(3)/MCL (ref 2.1–9.2)
NEUTROPHILS NFR BLD AUTO: 79.4 %
NRBC BLD AUTO-RTO: 0 %
PLATELET # BLD AUTO: 278 X10(3)/MCL (ref 130–400)
PMV BLD AUTO: 10.8 FL (ref 7.4–10.4)
POTASSIUM SERPL-SCNC: 3.7 MMOL/L (ref 3.5–5.1)
PROT SERPL-MCNC: 7.3 GM/DL (ref 5.8–7.6)
RBC # BLD AUTO: 4.57 X10(6)/MCL (ref 4.2–5.4)
SODIUM SERPL-SCNC: 140 MMOL/L (ref 136–145)
WBC # SPEC AUTO: 16.66 X10(3)/MCL (ref 4.5–11.5)

## 2024-04-30 PROCEDURE — 82550 ASSAY OF CK (CPK): CPT

## 2024-04-30 PROCEDURE — 36415 COLL VENOUS BLD VENIPUNCTURE: CPT

## 2024-04-30 PROCEDURE — 99214 OFFICE O/P EST MOD 30 MIN: CPT | Mod: PBBFAC | Performed by: NURSE PRACTITIONER

## 2024-04-30 PROCEDURE — 86160 COMPLEMENT ANTIGEN: CPT | Mod: 59

## 2024-04-30 PROCEDURE — 99215 OFFICE O/P EST HI 40 MIN: CPT | Mod: S$PBB,,, | Performed by: NURSE PRACTITIONER

## 2024-04-30 PROCEDURE — 85652 RBC SED RATE AUTOMATED: CPT

## 2024-04-30 PROCEDURE — 80053 COMPREHEN METABOLIC PANEL: CPT

## 2024-04-30 PROCEDURE — 86140 C-REACTIVE PROTEIN: CPT

## 2024-04-30 PROCEDURE — 0034U TPMT NUDT15 GENES: CPT

## 2024-04-30 PROCEDURE — 85025 COMPLETE CBC W/AUTO DIFF WBC: CPT

## 2024-04-30 PROCEDURE — 82085 ASSAY OF ALDOLASE: CPT

## 2024-04-30 PROCEDURE — 86160 COMPLEMENT ANTIGEN: CPT

## 2024-04-30 RX ORDER — FOLIC ACID 1 MG/1
1 TABLET ORAL DAILY
Qty: 90 TABLET | Refills: 1 | Status: SHIPPED | OUTPATIENT
Start: 2024-04-30

## 2024-04-30 RX ORDER — MYCOPHENOLATE MOFETIL 200 MG/ML
1500 POWDER, FOR SUSPENSION ORAL 2 TIMES DAILY
Qty: 450 ML | Refills: 3 | Status: SHIPPED | OUTPATIENT
Start: 2024-04-30 | End: 2024-05-07 | Stop reason: ALTCHOICE

## 2024-04-30 RX ORDER — PREDNISONE 5 MG/1
TABLET ORAL
Qty: 80 TABLET | Refills: 3 | Status: SHIPPED | OUTPATIENT
Start: 2024-04-30 | End: 2024-06-10 | Stop reason: SDUPTHER

## 2024-05-01 LAB — ALDOLASE SERPL-CCNC: 6.1 U/L

## 2024-05-05 LAB
ANNOTATION COMMENT IMP: NORMAL
GENETICIST REVIEW: NORMAL
LAB TEST METHOD: NORMAL
NUDT15 GENE MUT ANL BLD/T: NORMAL
NUDT15 GENE PROD MET ACT IMP BLD/T-IMP: NORMAL
PROVIDER SIGNING NAME: NORMAL
TEST PERFORMANCE INFO SPEC: NORMAL
TPMT GENE MUT ANL BLD/T: NORMAL
TPMT GENE PROD MET ACT IMP BLD/T-IMP: NORMAL

## 2024-05-07 ENCOUNTER — TELEPHONE (OUTPATIENT)
Dept: RHEUMATOLOGY | Facility: CLINIC | Age: 74
End: 2024-05-07
Payer: MEDICARE

## 2024-05-07 DIAGNOSIS — Z79.899 HIGH RISK MEDICATION USE: ICD-10-CM

## 2024-05-07 DIAGNOSIS — M35.1 MIXED CONNECTIVE TISSUE DISEASE: ICD-10-CM

## 2024-05-07 DIAGNOSIS — D89.89 ANTISYNTHETASE SYNDROME: Primary | ICD-10-CM

## 2024-05-07 DIAGNOSIS — Z79.899 DRUG-INDUCED IMMUNODEFICIENCY: ICD-10-CM

## 2024-05-07 DIAGNOSIS — D84.821 DRUG-INDUCED IMMUNODEFICIENCY: ICD-10-CM

## 2024-05-07 RX ORDER — AZATHIOPRINE 50 MG/1
50 TABLET ORAL DAILY
Qty: 30 TABLET | Refills: 1 | Status: SHIPPED | OUTPATIENT
Start: 2024-05-07 | End: 2025-05-07

## 2024-05-07 NOTE — TELEPHONE ENCOUNTER
----- Message from BO Bailey sent at 5/7/2024 12:40 PM CDT -----  Please advise the patient the following lab results: One of her inflammatory markers remains elevated, both muscle enzymes have returned to normal. Her WBC is noted to be elevated, please inquire if she has had any recent illness/urinary symptoms/etc- can be related to current steroid use- we will continue to monitor. TPMT did come back normal- this was the lab we discussed before starting new medication- I will send over new rx of Imuran 50 mg po qd to start  Please review s/e associated with medication such as risks of hepatotoxicity, bone marrow suppression, allergic reactions, malignancy and increased risk of infection. If she should develop any severe abd pain she should report to the ER for evaluation. We will continue to monitor lab closely as we have done previously as these are similar s/e to previous meds she has taken in the past.  She should also hold this medication for any fever/infections. Will need to check cbc, cmp in 3-4 weeks- orders placed, please place a reminder in the computer. She should also continue steroid taper as we discussed at her last visit and continue 7.5mg qd dosing once she tapers to this dose.

## 2024-05-07 NOTE — TELEPHONE ENCOUNTER
I attempted to call pt no answer left a voicemail message to call clinic back regarding lab results

## 2024-05-08 NOTE — TELEPHONE ENCOUNTER
Spoke to pt inform pt of message. Pt verbalized understanding pt stated she had wheezing 1 1/2 month ago was given breathing treatments stated not  sure if she was given steroid

## 2024-06-05 ENCOUNTER — LAB VISIT (OUTPATIENT)
Dept: LAB | Facility: HOSPITAL | Age: 74
End: 2024-06-05
Attending: NURSE PRACTITIONER
Payer: MEDICARE

## 2024-06-05 DIAGNOSIS — J84.9 ILD (INTERSTITIAL LUNG DISEASE): ICD-10-CM

## 2024-06-05 DIAGNOSIS — M35.1 MIXED CONNECTIVE TISSUE DISEASE: Primary | ICD-10-CM

## 2024-06-05 DIAGNOSIS — R49.0 DYSPHONIA: ICD-10-CM

## 2024-06-05 DIAGNOSIS — R13.13 PHARYNGEAL DYSPHAGIA: ICD-10-CM

## 2024-06-05 DIAGNOSIS — I27.20 PULMONARY HYPERTENSION, UNSPECIFIED: ICD-10-CM

## 2024-06-05 DIAGNOSIS — Z79.899 HIGH RISK MEDICATION USE: ICD-10-CM

## 2024-06-05 LAB
ALBUMIN SERPL-MCNC: 3.5 G/DL (ref 3.4–4.8)
ALBUMIN/GLOB SERPL: 0.9 RATIO (ref 1.1–2)
ALP SERPL-CCNC: 55 UNIT/L (ref 40–150)
ALT SERPL-CCNC: 10 UNIT/L (ref 0–55)
ANION GAP SERPL CALC-SCNC: 5 MEQ/L
AST SERPL-CCNC: 17 UNIT/L (ref 5–34)
BASOPHILS # BLD AUTO: 0.06 X10(3)/MCL
BASOPHILS NFR BLD AUTO: 0.6 %
BILIRUB SERPL-MCNC: 0.4 MG/DL
BUN SERPL-MCNC: 9.5 MG/DL (ref 9.8–20.1)
CALCIUM SERPL-MCNC: 9.6 MG/DL (ref 8.4–10.2)
CHLORIDE SERPL-SCNC: 107 MMOL/L (ref 98–107)
CO2 SERPL-SCNC: 30 MMOL/L (ref 23–31)
CREAT SERPL-MCNC: 0.88 MG/DL (ref 0.55–1.02)
CREAT/UREA NIT SERPL: 11
EOSINOPHIL # BLD AUTO: 0.12 X10(3)/MCL (ref 0–0.9)
EOSINOPHIL NFR BLD AUTO: 1.3 %
ERYTHROCYTE [DISTWIDTH] IN BLOOD BY AUTOMATED COUNT: 15.6 % (ref 11.5–17)
GFR SERPLBLD CREATININE-BSD FMLA CKD-EPI: >60 ML/MIN/1.73/M2
GLOBULIN SER-MCNC: 3.8 GM/DL (ref 2.4–3.5)
GLUCOSE SERPL-MCNC: 99 MG/DL (ref 82–115)
HCT VFR BLD AUTO: 36.5 % (ref 37–47)
HGB BLD-MCNC: 10.8 G/DL (ref 12–16)
IMM GRANULOCYTES # BLD AUTO: 0.04 X10(3)/MCL (ref 0–0.04)
IMM GRANULOCYTES NFR BLD AUTO: 0.4 %
LYMPHOCYTES # BLD AUTO: 2.69 X10(3)/MCL (ref 0.6–4.6)
LYMPHOCYTES NFR BLD AUTO: 28 %
MCH RBC QN AUTO: 24.8 PG (ref 27–31)
MCHC RBC AUTO-ENTMCNC: 29.6 G/DL (ref 33–36)
MCV RBC AUTO: 83.7 FL (ref 80–94)
MONOCYTES # BLD AUTO: 1.02 X10(3)/MCL (ref 0.1–1.3)
MONOCYTES NFR BLD AUTO: 10.6 %
NEUTROPHILS # BLD AUTO: 5.67 X10(3)/MCL (ref 2.1–9.2)
NEUTROPHILS NFR BLD AUTO: 59.1 %
NRBC BLD AUTO-RTO: 0 %
PLATELET # BLD AUTO: 268 X10(3)/MCL (ref 130–400)
PMV BLD AUTO: 10.2 FL (ref 7.4–10.4)
POTASSIUM SERPL-SCNC: 3.5 MMOL/L (ref 3.5–5.1)
PROT SERPL-MCNC: 7.3 GM/DL (ref 5.8–7.6)
RBC # BLD AUTO: 4.36 X10(6)/MCL (ref 4.2–5.4)
SODIUM SERPL-SCNC: 142 MMOL/L (ref 136–145)
WBC # SPEC AUTO: 9.6 X10(3)/MCL (ref 4.5–11.5)

## 2024-06-05 PROCEDURE — 85025 COMPLETE CBC W/AUTO DIFF WBC: CPT

## 2024-06-05 PROCEDURE — 80053 COMPREHEN METABOLIC PANEL: CPT

## 2024-06-05 PROCEDURE — 36415 COLL VENOUS BLD VENIPUNCTURE: CPT

## 2024-06-10 DIAGNOSIS — D89.89 ANTISYNTHETASE SYNDROME: ICD-10-CM

## 2024-06-10 DIAGNOSIS — M35.1 MIXED CONNECTIVE TISSUE DISEASE: ICD-10-CM

## 2024-06-10 RX ORDER — PREDNISONE 5 MG/1
TABLET ORAL
Qty: 30 TABLET | Refills: 3 | Status: SHIPPED | OUTPATIENT
Start: 2024-06-10

## 2024-06-10 RX ORDER — PREDNISONE 2.5 MG/1
TABLET ORAL
Qty: 30 TABLET | Refills: 3 | Status: SHIPPED | OUTPATIENT
Start: 2024-06-10

## 2024-06-11 ENCOUNTER — TELEPHONE (OUTPATIENT)
Dept: RHEUMATOLOGY | Facility: CLINIC | Age: 74
End: 2024-06-11
Payer: MEDICARE

## 2024-06-11 NOTE — TELEPHONE ENCOUNTER
----- Message from BO Bailey sent at 6/10/2024 10:34 AM CDT -----  Please advise the patient that all lab are noted to be clinically acceptable at this time, we will continue to monitor at future lab draws. Please let her know I am sending over the 2.5 mg Prednisone dose to take with her current 5 mg tab (she will continue on 7.5 mg po qd)- having issues with cutting tabs so sending in 2.5 mg tab so she does not have to cut anymore.

## 2024-06-11 NOTE — TELEPHONE ENCOUNTER
Spoke to pt informed pt of change strength  in medication prednisone to  take  1 1/2 tabs po qd 7.5 mg. Pt verbalized understanding.

## 2024-06-17 ENCOUNTER — TELEPHONE (OUTPATIENT)
Dept: RHEUMATOLOGY | Facility: CLINIC | Age: 74
End: 2024-06-17
Payer: MEDICARE

## 2024-07-24 DIAGNOSIS — D84.821 DRUG-INDUCED IMMUNODEFICIENCY: ICD-10-CM

## 2024-07-24 DIAGNOSIS — D89.89 ANTISYNTHETASE SYNDROME: ICD-10-CM

## 2024-07-24 DIAGNOSIS — M35.1 MIXED CONNECTIVE TISSUE DISEASE: ICD-10-CM

## 2024-07-24 DIAGNOSIS — Z79.899 DRUG-INDUCED IMMUNODEFICIENCY: ICD-10-CM

## 2024-07-24 DIAGNOSIS — Z79.899 HIGH RISK MEDICATION USE: ICD-10-CM

## 2024-07-24 RX ORDER — AZATHIOPRINE 50 MG/1
50 TABLET ORAL DAILY
Qty: 30 TABLET | Refills: 1 | Status: SHIPPED | OUTPATIENT
Start: 2024-07-24 | End: 2025-07-24

## 2024-08-20 RX ORDER — MYCOPHENOLATE MOFETIL 200 MG/ML
1500 POWDER, FOR SUSPENSION ORAL 2 TIMES DAILY
Qty: 450 ML | Refills: 1 | Status: SHIPPED | OUTPATIENT
Start: 2024-08-20

## 2024-08-20 RX ORDER — MYCOPHENOLATE MOFETIL 200 MG/ML
1500 POWDER, FOR SUSPENSION ORAL 2 TIMES DAILY
COMMUNITY
Start: 2024-07-24 | End: 2024-08-20 | Stop reason: SDUPTHER

## 2024-08-29 ENCOUNTER — LAB VISIT (OUTPATIENT)
Dept: LAB | Facility: HOSPITAL | Age: 74
End: 2024-08-29
Attending: INTERNAL MEDICINE
Payer: MEDICARE

## 2024-08-29 ENCOUNTER — OFFICE VISIT (OUTPATIENT)
Dept: RHEUMATOLOGY | Facility: CLINIC | Age: 74
End: 2024-08-29
Payer: MEDICARE

## 2024-08-29 ENCOUNTER — TELEPHONE (OUTPATIENT)
Dept: RHEUMATOLOGY | Facility: CLINIC | Age: 74
End: 2024-08-29
Payer: MEDICARE

## 2024-08-29 VITALS
DIASTOLIC BLOOD PRESSURE: 61 MMHG | BODY MASS INDEX: 25.94 KG/M2 | WEIGHT: 155.69 LBS | HEART RATE: 62 BPM | HEIGHT: 65 IN | RESPIRATION RATE: 18 BRPM | OXYGEN SATURATION: 99 % | SYSTOLIC BLOOD PRESSURE: 146 MMHG | TEMPERATURE: 98 F

## 2024-08-29 DIAGNOSIS — J84.9 ILD (INTERSTITIAL LUNG DISEASE): ICD-10-CM

## 2024-08-29 DIAGNOSIS — D89.89 ANTISYNTHETASE SYNDROME: Primary | ICD-10-CM

## 2024-08-29 DIAGNOSIS — I27.20 PULMONARY HYPERTENSION, UNSPECIFIED: ICD-10-CM

## 2024-08-29 DIAGNOSIS — M35.1 MIXED CONNECTIVE TISSUE DISEASE: ICD-10-CM

## 2024-08-29 DIAGNOSIS — Z79.899 DRUG-INDUCED IMMUNODEFICIENCY: ICD-10-CM

## 2024-08-29 DIAGNOSIS — D89.89 ANTISYNTHETASE SYNDROME: ICD-10-CM

## 2024-08-29 DIAGNOSIS — D84.821 DRUG-INDUCED IMMUNODEFICIENCY: ICD-10-CM

## 2024-08-29 DIAGNOSIS — R49.0 DYSPHONIA: ICD-10-CM

## 2024-08-29 LAB
ALBUMIN SERPL-MCNC: 3.8 G/DL (ref 3.4–4.8)
ALBUMIN/GLOB SERPL: 0.9 RATIO (ref 1.1–2)
ALP SERPL-CCNC: 64 UNIT/L (ref 40–150)
ALT SERPL-CCNC: 19 UNIT/L (ref 0–55)
ANION GAP SERPL CALC-SCNC: 5 MEQ/L
AST SERPL-CCNC: 24 UNIT/L (ref 5–34)
BACTERIA #/AREA URNS AUTO: ABNORMAL /HPF
BASOPHILS # BLD AUTO: 0.03 X10(3)/MCL
BASOPHILS NFR BLD AUTO: 0.3 %
BILIRUB SERPL-MCNC: 0.4 MG/DL
BILIRUB UR QL STRIP.AUTO: NEGATIVE
BUN SERPL-MCNC: 7.8 MG/DL (ref 9.8–20.1)
C3 SERPL-MCNC: 137 MG/DL (ref 80–173)
C4 SERPL-MCNC: 32 MG/DL (ref 13–46)
CALCIUM SERPL-MCNC: 9.9 MG/DL (ref 8.4–10.2)
CHLORIDE SERPL-SCNC: 106 MMOL/L (ref 98–107)
CK SERPL-CCNC: 146 U/L (ref 29–168)
CLARITY UR: CLEAR
CO2 SERPL-SCNC: 29 MMOL/L (ref 23–31)
COLOR UR AUTO: ABNORMAL
CREAT SERPL-MCNC: 0.81 MG/DL (ref 0.55–1.02)
CREAT UR-MCNC: 119.1 MG/DL (ref 45–106)
CREAT/UREA NIT SERPL: 10
CRP SERPL-MCNC: 1.6 MG/L
EOSINOPHIL # BLD AUTO: 0.01 X10(3)/MCL (ref 0–0.9)
EOSINOPHIL NFR BLD AUTO: 0.1 %
ERYTHROCYTE [DISTWIDTH] IN BLOOD BY AUTOMATED COUNT: 16.3 % (ref 11.5–17)
ERYTHROCYTE [SEDIMENTATION RATE] IN BLOOD: 33 MM/HR (ref 0–20)
GFR SERPLBLD CREATININE-BSD FMLA CKD-EPI: >60 ML/MIN/1.73/M2
GLOBULIN SER-MCNC: 4.1 GM/DL (ref 2.4–3.5)
GLUCOSE SERPL-MCNC: 133 MG/DL (ref 82–115)
GLUCOSE UR QL STRIP: NORMAL
HCT VFR BLD AUTO: 39.1 % (ref 37–47)
HGB BLD-MCNC: 11.4 G/DL (ref 12–16)
HGB UR QL STRIP: NEGATIVE
HYALINE CASTS #/AREA URNS LPF: ABNORMAL /LPF
IMM GRANULOCYTES # BLD AUTO: 0.03 X10(3)/MCL (ref 0–0.04)
IMM GRANULOCYTES NFR BLD AUTO: 0.3 %
KETONES UR QL STRIP: NEGATIVE
LEUKOCYTE ESTERASE UR QL STRIP: NEGATIVE
LYMPHOCYTES # BLD AUTO: 1.4 X10(3)/MCL (ref 0.6–4.6)
LYMPHOCYTES NFR BLD AUTO: 15 %
MCH RBC QN AUTO: 24.4 PG (ref 27–31)
MCHC RBC AUTO-ENTMCNC: 29.2 G/DL (ref 33–36)
MCV RBC AUTO: 83.7 FL (ref 80–94)
MONOCYTES # BLD AUTO: 0.26 X10(3)/MCL (ref 0.1–1.3)
MONOCYTES NFR BLD AUTO: 2.8 %
MUCOUS THREADS URNS QL MICRO: ABNORMAL /LPF
NEUTROPHILS # BLD AUTO: 7.58 X10(3)/MCL (ref 2.1–9.2)
NEUTROPHILS NFR BLD AUTO: 81.5 %
NITRITE UR QL STRIP: NEGATIVE
NRBC BLD AUTO-RTO: 0 %
PH UR STRIP: 6.5 [PH]
PLATELET # BLD AUTO: 248 X10(3)/MCL (ref 130–400)
PMV BLD AUTO: 10.6 FL (ref 7.4–10.4)
POTASSIUM SERPL-SCNC: 4.2 MMOL/L (ref 3.5–5.1)
PROT SERPL-MCNC: 7.9 GM/DL (ref 5.8–7.6)
PROT UR QL STRIP: NEGATIVE
PROT UR STRIP-MCNC: 7.8 MG/DL
RBC # BLD AUTO: 4.67 X10(6)/MCL (ref 4.2–5.4)
RBC #/AREA URNS AUTO: ABNORMAL /HPF
SODIUM SERPL-SCNC: 140 MMOL/L (ref 136–145)
SP GR UR STRIP.AUTO: 1.02 (ref 1–1.03)
SQUAMOUS #/AREA URNS LPF: ABNORMAL /HPF
URINE PROTEIN/CREATININE RATIO (OLG): 0.1
UROBILINOGEN UR STRIP-ACNC: NORMAL
WBC # BLD AUTO: 9.31 X10(3)/MCL (ref 4.5–11.5)
WBC #/AREA URNS AUTO: ABNORMAL /HPF

## 2024-08-29 PROCEDURE — 99214 OFFICE O/P EST MOD 30 MIN: CPT | Mod: PBBFAC | Performed by: INTERNAL MEDICINE

## 2024-08-29 PROCEDURE — 80053 COMPREHEN METABOLIC PANEL: CPT

## 2024-08-29 PROCEDURE — 82550 ASSAY OF CK (CPK): CPT

## 2024-08-29 PROCEDURE — 85025 COMPLETE CBC W/AUTO DIFF WBC: CPT

## 2024-08-29 PROCEDURE — 86160 COMPLEMENT ANTIGEN: CPT

## 2024-08-29 PROCEDURE — 81001 URINALYSIS AUTO W/SCOPE: CPT

## 2024-08-29 PROCEDURE — 36415 COLL VENOUS BLD VENIPUNCTURE: CPT

## 2024-08-29 PROCEDURE — 86140 C-REACTIVE PROTEIN: CPT

## 2024-08-29 PROCEDURE — 82085 ASSAY OF ALDOLASE: CPT

## 2024-08-29 PROCEDURE — 85652 RBC SED RATE AUTOMATED: CPT

## 2024-08-29 PROCEDURE — 99215 OFFICE O/P EST HI 40 MIN: CPT | Mod: S$PBB,,, | Performed by: INTERNAL MEDICINE

## 2024-08-29 PROCEDURE — G2211 COMPLEX E/M VISIT ADD ON: HCPCS | Mod: S$PBB,,, | Performed by: INTERNAL MEDICINE

## 2024-08-29 PROCEDURE — 84156 ASSAY OF PROTEIN URINE: CPT

## 2024-08-29 RX ORDER — AZATHIOPRINE 50 MG/1
50 TABLET ORAL DAILY
Qty: 30 TABLET | Refills: 3 | Status: SHIPPED | OUTPATIENT
Start: 2024-08-29 | End: 2025-08-29

## 2024-08-29 RX ORDER — MYCOPHENOLATE MOFETIL 200 MG/ML
1500 POWDER, FOR SUSPENSION ORAL 2 TIMES DAILY
Qty: 450 ML | Refills: 3 | Status: SHIPPED | OUTPATIENT
Start: 2024-08-29

## 2024-08-29 RX ORDER — PREDNISONE 2.5 MG/1
TABLET ORAL
Qty: 30 TABLET | Refills: 3 | Status: SHIPPED | OUTPATIENT
Start: 2024-08-29

## 2024-08-29 RX ORDER — PROPRANOLOL HYDROCHLORIDE 40 MG/1
40 TABLET ORAL 2 TIMES DAILY
COMMUNITY
Start: 2024-08-16

## 2024-08-29 RX ORDER — PREDNISONE 5 MG/1
TABLET ORAL
Qty: 30 TABLET | Refills: 3 | Status: SHIPPED | OUTPATIENT
Start: 2024-08-29

## 2024-08-29 NOTE — PROGRESS NOTES
Patient ID: [id number]     Chief Complaint: Follow-up (Pt has no concerns this visit)      HPI:     Devi Fernandes is a 73 y.o. 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       She was diagnosed with Lupus in 2008. She had seen Rheumatologist in [place] in the past, does not remember the name. She was treated with prednisone. She was treated with some medications back then, she does not remember the name on medications. She may have been on lmuran, methotrexate and hydroxychloroquine in the past. Used higher dose of prednisone in the past. She was on prednisone daily in the past.     She was admitted at [facility] in [facility] in April 2023, shows admitted for Rhabdomyolysis due to COVID, she was treated with steroids. Reviewed discharge summary.     She has dysphagia, cannot take tablets, she is taking liquid CellCept, 1500 mg twice daily. Tolerating it well.  She had another flare with elevated CPK and aldolase in March 20, 2024, added Prograf but she has severe diarrhea with it.  Discontinued Prograf and started her on Imuran 50 mg daily in April 20, 2024.  Currently prednisone dose is back down to her baseline dose 7.5 mg daily.  She had flare of anti synthetase syndrome in 8/2022, added IVIG. She took IVIG for few months and stopped in November 2022, she does not like to get those infusions, she does not find them helpful(even though flare was improved with it, CPK normalized) she does not like to continue it and said she will consider it if she flares again.   Dr Llanes had discussed OFEV, she has not started it yet because of dysphagia, she can not swallow capsules. Had seen Neurology and ENT, Dr Miranda for dysphagia and dysphonia.   She had seen Dr. Llanes recently, she had PFTs done in 1st week of August.  Morning stiffness for 5 min. Denies red, warm and swollen joints.   She follows with Neurology Dr. Goss, switched metoprolol to propranolol for tremors.  Admits swallowing issues  since 2017. She has to swallow small pieces. Swallowing issues getting worse. She chokes on solids and liquids. Food does not get stuck in chest intermittently. Symptoms are stable.   Admits chronic shortness of breath. Denies productive cough. Patient thinks SOB slightly worse than before.  Shortness of breath worse with walking but denies shortness of breath when she does aerobic exercise.  Admits color changes in hands for the last 15 years, since . Cold is trigger. She will have when its cold, could be twice month or every day if its cold. Episode lasts for 15 min. Fingers never turn white, always purple in tips.   She had surgery for vocal cord polyps in 2017 and since then she has dysphonia. Voice issues are same since 2017, not getting worse. She follows with Dr Miranda.       Denies any recent illness or hospitalizations since last visit.     Denies fevers, oral or nasal ulcers, rashes, photosensitivity, history of DVT or PE, history of stroke or seizures, history of Ml, history of inflammatory eye diseases, history of malignancy.   Autoimmune diseases in family: none.   Miscarriages: 2, first trimester miscarriages. Pregnancies: 3   Smoking: nonsmoker.     Social History     Tobacco Use   Smoking Status Never   Smokeless Tobacco Never          -------------------------------------    Allergy    Aortic regurgitation    Arthritis    BMI 22.0-22.9, adult    Deviated nasal septum    Diabetes mellitus    Dysphagia    GERD (gastroesophageal reflux disease)    Hypertension    PAOLA (obstructive sleep apnea)    Pulmonary fibrosis    Pulmonary hypertension    Pulmonic regurgitation    Restrictive lung disease    Sinus bradycardia    Valvular heart disease        Past Surgical History:   Procedure Laterality Date    APPENDECTOMY      CARDIAC CATHETERIZATION  2017     SECTION      COLONOSCOPY  2011    ESOPHAGEAL MANOMETRY  2018    EYE SURGERY  2021    Cataract surgery    GASTROSCOPY   02/15/2018    HYSTERECTOMY  2003    Vocal Cord Surgery  08/2017    cyst removed       Review of patient's allergies indicates:   Allergen Reactions    Codeine Other (See Comments)     Becomes weak and over heated.   Other reaction(s): unknown  Hot flashes       Outpatient Medications Marked as Taking for the 8/29/24 encounter (Office Visit) with Rad Manzanares MD   Medication Sig Dispense Refill    amLODIPine (NORVASC) 5 MG tablet Take 1 tablet by mouth once daily.      azaTHIOprine (IMURAN) 50 mg Tab Take 1 tablet (50 mg total) by mouth once daily. Hold for fever or infections. 30 tablet 1    biotin 5,000 mcg Chew Take 1 tablet by mouth Daily. Pt stated doesn't take it regularly      carvediloL (COREG) 3.125 MG tablet Take 1 tablet twice a day by oral route.      folic acid (FOLVITE) 1 MG tablet Take 1 tablet (1 mg total) by mouth once daily. 90 tablet 1    ipratropium (ATROVENT) 42 mcg (0.06 %) nasal spray 2 sprays 2 (two) times daily as needed.      metoprolol tartrate (LOPRESSOR) 25 MG tablet TAKE ONE TABLET BY MOUTH TWO TIMES A DAY DISCONTINUE COREG      mycophenolate mofetil (CELLCEPT) 200 mg/mL SusR Take 7.5 mLs (1,500 mg total) by mouth 2 (two) times daily. 450 mL 1    pantoprazole (PROTONIX) 20 MG tablet Take 1 tablet (20 mg total) by mouth 2 (two) times daily. Take 20 mg by mouth 2 (two) times daily. 180 tablet 3    predniSONE (DELTASONE) 2.5 MG tablet Take 1 tab po qd with 5 mg tab for a total of 7.5 mg po qd 30 tablet 3    predniSONE (DELTASONE) 5 MG tablet Take 7.5 mg po qd. 30 tablet 3    propranoloL (INDERAL) 40 MG tablet Take 40 mg by mouth 2 (two) times daily.      propylene glycol (SYSTANE BALANCE OPHT) Apply 1 drop to eye daily as needed.      triamcinolone (NASACORT) 55 mcg nasal inhaler 2 sprays by Nasal route once daily.         Social History     Socioeconomic History    Marital status:    Tobacco Use    Smoking status: Never    Smokeless tobacco: Never   Substance and Sexual  Activity    Alcohol use: No    Drug use: No    Sexual activity: Not Currently     Partners: Female     Birth control/protection: Abstinence     Social Determinants of Health     Financial Resource Strain: Low Risk  (4/19/2023)    [facility]    Overall Financial Resource Strain (CARDIA)     Difficulty of Paying Living Expenses: Not hard at all   Food Insecurity: No Food Insecurity (4/19/2023)    [facility]    Hunger Vital Sign     Worried About Running Out of Food in the Last Year: Never true     Ran Out of Food in the Last Year: Never true   Transportation Needs: No Transportation Needs (4/19/2023)    [facility]    PRAPARE - Transportation     Lack of Transportation (Medical): No     Lack of Transportation (Non-Medical): No        Family History   Problem Relation Name Age of Onset    Cancer Mother Mrs. Sade Fernandes     Diabetes Mellitus Father Lanre Fernandes, Sr.     Diabetes Father Lanre Fernandes, Sr.     Vision loss Father Lanre Fernandes, Sr.     Cancer Sister      Breast cancer Sister  66        Left breast        Immunization History   Administered Date(s) Administered    COVID-19, MRNA, LN-S, PF (MODERNA FULL 0.5 ML DOSE) 10/30/2021, 04/12/2022, 08/06/2022    Influenza (FLUAD) - Quadrivalent - Adjuvanted - PF *Preferred* (65+) 11/03/2022, 10/06/2023    Influenza (FLUAD) - Trivalent - Adjuvanted - PF (65+) 10/10/2018    Influenza - High Dose - PF (65 years and older) 12/04/2017, 09/17/2019, 09/30/2020, 10/21/2021    Influenza - Trivalent (ADULT) 09/30/2020    Pneumococcal Conjugate - 13 Valent 02/06/2019    Pneumococcal Polysaccharide - 23 Valent 11/03/2022    RSVpreF (Arexvy) 11/06/2023    Td (Adult), Unspecified Formulation 10/21/2020    Tdap 02/06/2019    Zoster Recombinant 11/03/2022, 05/09/2023       Patient Care  "Team:  Ariana Grewal MD as PCP - General (Internal Medicine)  Mindy Aguilar MD as Consulting Physician (Gastroenterology)     Subjective:       Constitutional:  Denies chills. Denies fever. Denies night sweats. Denies weight loss.   Ophthalmology: Denies blurred vision. Denies dry eyes. Denies eye pain. Denies Itching and redness.   ENT: Denies oral ulcers. Denies epistaxis. Denies dry mouth. Denies swollen glands.   Endocrine: Denies diabetes. Denies thyroid Problems.   Respiratory: Denies cough. Denies shortness of breath. Admits shortness of breath with exertion. Denies hemoptysis.   Cardiovascular: Denies chest pain at rest. Denies chest pain with exertion. Denies palpitations- has resolved with med increase by Cardiology  Gastrointestinal: Denies abdominal pain. Admits diarrhea with new medication. Denies nausea. Denies vomiting. Denies hematemesis or hematochezia. Denies heartburn.  Genitourinary: Denies blood in urine.  Musculoskeletal: See HPI for details  Integumentary: Denies rash. Denies photosensitivity.   Peripheral Vascular: Denies Ulcers of hands and/or feet. Denies Cold extremities.   Neurologic: Denies dizziness. Denies headache.  Denies loss of strength. Denies numbness or tingling.   Psychiatric: Denies depression. Denies anxiety. Denies suicidal/homicidal ideations.      Objective:     BP (!) 146/61 (BP Location: Right arm, Patient Position: Sitting, BP Method: Small (Automatic))   Pulse 62   Temp 98 °F (36.7 °C) (Oral)   Resp 18   Ht 5' 5" (1.651 m)   Wt 70.6 kg (155 lb 11.2 oz)   SpO2 99%   BMI 25.91 kg/m²     Physical Exam    General Appearance: alert, pleasant, in no acute distress.  Skin: Skin color, texture, turgor normal. No rashes or lesions. Few capillary dilatations, no drop outs.   Eyes:  extraocular movement intact (EOMI), pupils equal, round, reactive to light and accommodation, conjunctiva clear.  ENT: No oral or nasal ulcers.  Neck:  Neck supple. No adenopathy. "   Lungs: fine crackles in bases, clear through out.   Heart: RRR.  No edema. Soft systolic murmur present.   Neuro: Alert, oriented, CN II-XII GI, sensory and motor innervation intact.  Psych: Alert, oriented, normal eye contact.    Joint Exam:  DIPs, PIPs, MCPs: no pain on palpation, no swelling or redness, no nodules. DJD of bilateral hands.   Wrists: no pain on palpation, no swelling or redness, good range of motion.  Elbows: no pain on palpation, no swelling or redness, good range of motion, no nodules.  Feet: no pain on palpation, no swelling or redness, no nodules.    Labs:   August 2021: CMP okay. CBC okay. SPEP showed polyclonal gammopathy and elevated beta proteins. 2/18/2022 :ESR, CRP normal. CBC okay. CMP okay. Globulin and protein slightly high. CPK normal.  mg/g. Rheumatoid factor and anti CCP negative. Positive RNP antibody, 4.6. Positive SSA, 2.7. Negative SCL 70, SSB, Smith, dsDNA, thyroglobulin, TPO, RNA polymerase 3, centromere antibodies. Aldolase normal. ANCA, MPO and IL-3 negative. Cardiolipin, beta-2 glycoprotein negative. LAC negative. Positive ISMAEL, nuclear, cytoplasmic and homogeneous pattern, 1:320. Positive myositis antibody, EJ 78, high titer, PL? is 12, Ml-2 is 17. C3, C4 normal. No blood and protein in urine.   3/24/22: Hep B, C, HIV, quantiferon tb negative.   5/3/2022 RDW elevated 15.0. CBC okay. Glucose 129. CMP okay. CPK and aldolase normal. ESR 24, CRP normal. TPMT low, low metaboilizer.   6/10/2022: CMP okay. LFTs normal. CPK and aldoalse normal. Hemoglobin 11.8. ESR 45, normal less than 30. CRP normal. No blood and protein in urine. C3, C4 okay.  mg/g.   8/17/2022- during hospitalization: AST elevated 72, otherwise CMP okay. CPK greater than 2000. Elevated WBC count, otherwise CBC okay. Small protein and no blood in urine.  8/25/2022: C3, C4 okay. Urine protein less than 15 mg/dL. No blood and protein in urine. Small leukocyte esterase. WBC count elevated secondary  to steroids. WBC count 13.4, hemoglobin 11.5. Bicarb slightly elevated. CMP okay otherwise. CPK elevated 2076. ESR 40, CRP normal. Aldolase 28.   23: CMP ok. ESR 55, CRP normal. Hg 11.7. C3, C4 normal. CPK and aldolase normal.  3/27/23:  UA, upc okay.  CMP okay.  ESR 32.  WBC count 11.8.  CBC okay otherwise.  C3, C4 okay.  CRP normal.  Aldolase and CPK normal.  23:  WBC count elevated 15.3.  Hemoglobin 11.0.  AST 49, ALC 76, upper limits of normal.  Calcium 8.3.  GFR normal.  CPK elevated 1096.  BNP normal.  No protein and blood in urine.  Labs done by her PCP, Dr. Rebollar.  2023; trace protein and no blood in urine.  Upc okay.  CMP okay.  ESR 22.  Hemoglobin 11.0.  CBC okay otherwise.  C3, C4 normal.  CPK and aldolase normal.  CRP normal.  23:  CPK normal.  UPC okay.  Trace protein and no blood in urine urine.  CRP 14.6.  CMP okay.  ESR 33.  Hemoglobin 11.9.  Aldolase normal.  C3, C4 okay  24: Hg 11.1.  AST 42.  CPK 7,946. Aldolase 44.  24:  CMP okay.  Hemoglobin 11.9.  CPK normal. Aldolase normal  2024 CRP 7.40 (<5), CMP ok. Sed Rate 55 (<20), CBC WBC 16.66 ANC 13.21, otherwise ok. CPK 68 ok, UPC ok. Urine no blood or protein noted. C3/C4 ok. Aldolase 6.1 ok, TPMT normal metabolizer.  2024 CMP ok, CBC hgb 10.8, hct 36.5, otherwise ok.     Imagin2021: CT neck soft tissue: no soft tissue abnormalities. Of cervical spine. No enlarged lymph nodes. Thyroid normal.   Retropharyngeal space unremarkable. Normal larynx.   Swallowing study: 10/2021: No penetration or aspiration. Speech pathology evaluation showed moderate to severe oropharyngeal dysphagia, lingual weakness, reduced range of motion and coordination, pharyngeal delay, refused hypo laryngeal elevation, reduced epiglottic inversion and pharyngeal squeeze. Recommend softer fine chopped meats thin liquids, no straws. Crushed meds in puree consistency. Aspiration precautions. ENT and neurological evaluation is  recommended.     02/18/22: Chest x-ray showed coarse reticular opacities in the mid and lower lungs, unchanged from October 2021. Calcaneal spurring bilaterally, no erosions. Normal x-ray of bilateral knees. Normal x ray of bilateral hands, no erosions.     ECHO:  Echocardiogram 9/8/2021: Concentric left ventricular hypertrophy. Diastolic dysfunction. Mild aortic insufficiency. Moderate pulmonary hypertension. EF 55 to 60%. PASP 41 mmHg.   03/15/2023:  Echocardiogram 08/30/2022 showed normal systolic function, EF 60-65%, grade 1 diastolic dysfunction, PASP 34 mmHg.   Echocardiogram 03/06/2024 ejection fraction 66%, grade 1 diastolic dysfunction.  PASP 34 mmHg.  No pericardial effusion.    CT CHEST:   3/16/2022 :CT chest showed old granulomatous disease. Predominantly bibasilar interstitial pulmonary fibrosis. Minimal peripheraI honeycombing. Extensive reticuIonoduIar Iung changes and small calcified lung nodules. Bronchiectasis present. Slightly enlarged mediastinal lymph nodes.   CT chest on 03/15/2023 showed stable pulmonary fibrosis with peripheral honeycombing, bronchiectasis, old granulomatous disease, stable right lateral chest subpleural dense opacity associated with posterior fissures.  Fine reticular lung markings with ground-glass opacity and ground-glass density.  Stable small right upper lobes pleural nodule.    12/15/2023: CT chest showed old granulomatous disease.  Stable predominant basilar interstitial pulmonary fibrosis peripheral honeycombing.  Left upper lobe subpleural reticular markings, extensive left lower lobe reticulonodular changes, extensive right middle and right lower lobe nodular changes.  Course right upper subpleural lung markings.  CT chest 03/12/2024: bibasilar bronchiectatic changes, septal thickening and ground-glass opacities bilaterally.  Nurse small hyperdense foci in the base of lung which may be related to aspirated barium.  No honeycombing, no air  trapping    PFTs:  10/28/2021: PFTs: FVC 71%, FEV1 83%, ratio 91%, total lung capacity 77%, DLCO 22%, DLCO/VA 44%.  2/14/23: PFTs: FVC 75%, FEV1 84%, ratio 86, total lung capacity 78%, DLCO 26%, DLCO/VA 52%.  09/18/2023 PFTs showed FVC 75%, FEV1 89%, ratio 92, total lung capacity 63%, residual volume 56, DLCO 19, DLCO/VA 39.    Previous rheumatology records from [place]: Dr. Mynor Delarosa: Clinic visit in October 2020: Diagnosed with lupus, pulmonary fibrosis, pulmonary hypertension. She was treated with methotrexate and Plaquenil in the past. Patient has stable and ongoing shortness of PFTs showed markedly low DLCO. Off of her medications currently. Labs unremarkable. Need to order high resolution. Nonproductive cough. Muscle strength normal. Chest x ray October 2020. Dense reticulonodular fibrosis at the lung bases.       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 Mixed connective tissue disease  M35.1 710.8   3. Drug-induced immunodeficiency  D84.821 279.3    Z79.899 E947.9   4. ILD (interstitial lung disease)  J84.9 515   5. Dysphonia  R49.0 784.42   6. Pulmonary hypertension, unspecified  I27.20 416.8            Plan:     73-year-old pleasant -American woman presents for follow up of lupus, rheumatoid arthritis, mixed connective tissue disease, interstitial lung disease, vocal cord dysfunction, dysphagia here for follow up of antisynthetase syndrome.     1. Antisynthetase syndrome: Positive myositis antibody, EJ 78, high titer, PL7 is 12, Ml-2 is 17. Positive myositis panel, questionable Raynaud's phenomenon, dysphagia, dysphonia. Intermittent elevation of CPK, had flare in 8/2022 and again most recently 2/2024. Discussed the disease course and treatment options of antisynthetase syndrome. Positive SSA ab associated with ILD and ATS, poor prognosis.  - Started IVIG infusions as she has hoarseness of voice and dysphagia with elevated CPK. Recieved first dose of  IVIG 2 g/kg, 130 mg total dose on 9/6/22. Tolerated IVIG well. She had received few more doses and stopped in 12/2022. She does not like to take infusions and will consider in the future if she flares again, refusing to take it currently.  - she had another small flare in March 20, 2024 with elevated CPK and aldolase after tapering prednisone down to 5 mg daily.  Prograf was added but she has not tolerated it due to diarrhea.  Added Imuran 50 mg daily ISMAEL 2024.  -  continue with Imuran 50 mg daily.  - C/w CellCept 1500 mg twice daily, 7.5 ml BID.    - continue with prednisone 7.5 mg po qd- continue this dose, will not taper it now.  - PFT's stable in 2/2023, CT chest stable in 3/2023.  - Mild decline in DLCO in 9/2023, she just did PFTs in 1st week of August, requested the records.  - ECHO stable 3/2024     2. Color changes in hands: Does not have typical color changes of Raynaud's phenomenon, saw pictures in her phone, hands turn dark when its cold. Few dilated capillary nail folds, no dropouts on exam.      3. ILD (interstitial lung disease): Interstitial lung disease: DLCO low, 22% in October 2021. CT chest showed bibasilar reticulonodular changes and mild honeycombing in March 2022. Admits chronic cough, SOB with exertion. She was referred to pulmonary, she had seen Dr Llanes. Continue close follow-up with Dr. Llanes. OFEV was discussed but never started it, due to swallowing issues.   - Requested and reviewed records from Pulmonary, clinic visit on 03/15/2023:  Echocardiogram 08/30/2022 showed normal systolic function, EF 60-65%, grade 1 diastolic dysfunction, PASP 34 mmHg.  CT chest on 03/15/2023 showed stable pulmonary fibrosis with peripheral honeycombing, bronchiectasis, old granulomatous disease, stable right lateral chest subpleural dense opacity associated with posterior fissures.  Fine reticular lung markings with ground-glass opacity and ground-glass density.  Stable small right upper lobes pleural nodule.  Recommend 2.5 L of oxygen at night.    - Will continue to monitor.  - OFEV was prescribed but she can not tolerate it because of dysphagia, can not swallow capsules.  09/18/2023 PFTs showed FVC 75%, FEV1 89%, ratio 92, total lung capacity 63%, residual volume 56, DLCO 19, DLCO/VA 39.   - Rx plan as above.   - Keep apt with Dr. Llanes.  Requested records of recent PFTs.     4. Dysphonia/dysphagia: Dysphonia is stable. Dysphagia is getting worse. Dysphagia and dysphonia secondary to antisynthetase syndrome. Swallow evaluation showed severe oropharyngeal dysphagia. Recommend ENT and neurological evaluation. She had seen neurologist Dr Francine Goss and ENT Dr Miranda.   - Advised to follow up with ENT.     5. Mixed connective tissue disease: She was treated with lmuran, methotrexate and Plaquenil in the past. Positive ISMAEL, nuclear, cytoplasmic and homogeneous pattern, 1:320. Positive RNP antibody, 4.6. Positive SSA, 2.7. Positive ISMAEL and SSA antibody likely associated with antisynthetase syndrome. She has features of mixed connective tissue disease and antisynthetase syndrome.   - Check complements and UA every 6 months.      7. Heart murmur, history of heart issues/Pulmonary HTN: Requested and reviewed records from cardiology Dr Warren. Echocardiogram in September 2021 showed diastolic dysfunction, normal systolic function and elevated PASP 41 mmHg. Continue close follow-up with Cardiology and pulmonary.      8. High risk medication use/Drug Induced Immunodeficency: Persons with rheumatoid arthritis, lupus, psoriatic arthritis and other autoimmune diseases are at increased risk of cardiovascular disease including heart attack and stroke. We recommend that all patients with these conditions have annual health maintenance exams including lipid measurements, blood pressure measurements, and smoking cessation counseling when applicable at their primary care provider's office.   -Mammogram UTD, PAPs completed due to age,  Colonscopy scheduled for 11/2024  - Advised to stay up-to-date on age appropriate vaccinations and malignancy screening, including yearly skin exams.    - Reviewed uptodate on vaccines, zoster, pneumovax, reviewed. Received RSV vaccine.      9. Bone health/Postmenopausal: Advised to take multivitamin and calcium supplementations. She had DXA done in 8/2021, Dr Rebollar monitoring it.   - She had fractured left 4th metatarsal in winter 2022, when she tripped while getting out of car wearing tight heels with straps.   - PCP managing this.          Follow up in about 3 months (around 11/25/2024) for with Dr Rad Manzanares at 1 pm. . In addition to their scheduled follow up, the patient has also been instructed to follow up on as needed basis.      Total time spent with patient and documentation is 40 minutes. All questions were answered to patient's satisfaction and patient verbalized understanding.

## 2024-09-05 LAB — ALDOLASE SERPL-CCNC: 5.1 U/L

## 2024-11-25 ENCOUNTER — LAB VISIT (OUTPATIENT)
Dept: LAB | Facility: HOSPITAL | Age: 74
End: 2024-11-25
Attending: INTERNAL MEDICINE
Payer: MEDICARE

## 2024-11-25 ENCOUNTER — OFFICE VISIT (OUTPATIENT)
Dept: RHEUMATOLOGY | Facility: CLINIC | Age: 74
End: 2024-11-25
Payer: MEDICARE

## 2024-11-25 VITALS
HEIGHT: 65 IN | RESPIRATION RATE: 20 BRPM | TEMPERATURE: 98 F | WEIGHT: 153.81 LBS | HEART RATE: 60 BPM | OXYGEN SATURATION: 100 % | BODY MASS INDEX: 25.63 KG/M2 | DIASTOLIC BLOOD PRESSURE: 70 MMHG | SYSTOLIC BLOOD PRESSURE: 157 MMHG

## 2024-11-25 DIAGNOSIS — D89.89 ANTISYNTHETASE SYNDROME: Primary | ICD-10-CM

## 2024-11-25 DIAGNOSIS — R13.19 OTHER DYSPHAGIA: ICD-10-CM

## 2024-11-25 DIAGNOSIS — D84.821 DRUG-INDUCED IMMUNODEFICIENCY: ICD-10-CM

## 2024-11-25 DIAGNOSIS — I27.20 PULMONARY HYPERTENSION, UNSPECIFIED: ICD-10-CM

## 2024-11-25 DIAGNOSIS — Z79.899 DRUG-INDUCED IMMUNODEFICIENCY: ICD-10-CM

## 2024-11-25 DIAGNOSIS — R49.0 DYSPHONIA: ICD-10-CM

## 2024-11-25 DIAGNOSIS — M35.1 MIXED CONNECTIVE TISSUE DISEASE: ICD-10-CM

## 2024-11-25 DIAGNOSIS — D89.89 ANTISYNTHETASE SYNDROME: ICD-10-CM

## 2024-11-25 DIAGNOSIS — J84.9 ILD (INTERSTITIAL LUNG DISEASE): ICD-10-CM

## 2024-11-25 LAB
ALBUMIN SERPL-MCNC: 3.9 G/DL (ref 3.4–4.8)
ALBUMIN/GLOB SERPL: 1 RATIO (ref 1.1–2)
ALP SERPL-CCNC: 63 UNIT/L (ref 40–150)
ALT SERPL-CCNC: 10 UNIT/L (ref 0–55)
ANION GAP SERPL CALC-SCNC: 5 MEQ/L
AST SERPL-CCNC: 18 UNIT/L (ref 5–34)
BASOPHILS # BLD AUTO: 0.05 X10(3)/MCL
BASOPHILS NFR BLD AUTO: 0.6 %
BILIRUB SERPL-MCNC: 0.3 MG/DL
BUN SERPL-MCNC: 9.1 MG/DL (ref 9.8–20.1)
CALCIUM SERPL-MCNC: 10.2 MG/DL (ref 8.4–10.2)
CHLORIDE SERPL-SCNC: 106 MMOL/L (ref 98–107)
CK SERPL-CCNC: 90 U/L (ref 29–168)
CO2 SERPL-SCNC: 30 MMOL/L (ref 23–31)
CREAT SERPL-MCNC: 0.85 MG/DL (ref 0.55–1.02)
CREAT/UREA NIT SERPL: 11
EOSINOPHIL # BLD AUTO: 0.02 X10(3)/MCL (ref 0–0.9)
EOSINOPHIL NFR BLD AUTO: 0.3 %
ERYTHROCYTE [DISTWIDTH] IN BLOOD BY AUTOMATED COUNT: 15 % (ref 11.5–17)
GFR SERPLBLD CREATININE-BSD FMLA CKD-EPI: >60 ML/MIN/1.73/M2
GLOBULIN SER-MCNC: 3.9 GM/DL (ref 2.4–3.5)
GLUCOSE SERPL-MCNC: 103 MG/DL (ref 82–115)
HCT VFR BLD AUTO: 40.9 % (ref 37–47)
HGB BLD-MCNC: 12.2 G/DL (ref 12–16)
IMM GRANULOCYTES # BLD AUTO: 0.02 X10(3)/MCL (ref 0–0.04)
IMM GRANULOCYTES NFR BLD AUTO: 0.3 %
LYMPHOCYTES # BLD AUTO: 1.48 X10(3)/MCL (ref 0.6–4.6)
LYMPHOCYTES NFR BLD AUTO: 18.6 %
MCH RBC QN AUTO: 25.3 PG (ref 27–31)
MCHC RBC AUTO-ENTMCNC: 29.8 G/DL (ref 33–36)
MCV RBC AUTO: 84.7 FL (ref 80–94)
MONOCYTES # BLD AUTO: 0.27 X10(3)/MCL (ref 0.1–1.3)
MONOCYTES NFR BLD AUTO: 3.4 %
NEUTROPHILS # BLD AUTO: 6.13 X10(3)/MCL (ref 2.1–9.2)
NEUTROPHILS NFR BLD AUTO: 76.8 %
NRBC BLD AUTO-RTO: 0 %
PLATELET # BLD AUTO: 252 X10(3)/MCL (ref 130–400)
PMV BLD AUTO: 11.4 FL (ref 7.4–10.4)
POTASSIUM SERPL-SCNC: 4.8 MMOL/L (ref 3.5–5.1)
PROT SERPL-MCNC: 7.8 GM/DL (ref 5.8–7.6)
RBC # BLD AUTO: 4.83 X10(6)/MCL (ref 4.2–5.4)
SODIUM SERPL-SCNC: 141 MMOL/L (ref 136–145)
WBC # BLD AUTO: 7.97 X10(3)/MCL (ref 4.5–11.5)

## 2024-11-25 PROCEDURE — 82085 ASSAY OF ALDOLASE: CPT

## 2024-11-25 PROCEDURE — 85025 COMPLETE CBC W/AUTO DIFF WBC: CPT

## 2024-11-25 PROCEDURE — 80053 COMPREHEN METABOLIC PANEL: CPT

## 2024-11-25 PROCEDURE — 36415 COLL VENOUS BLD VENIPUNCTURE: CPT

## 2024-11-25 PROCEDURE — 99215 OFFICE O/P EST HI 40 MIN: CPT | Mod: S$PBB,,, | Performed by: INTERNAL MEDICINE

## 2024-11-25 PROCEDURE — 82550 ASSAY OF CK (CPK): CPT

## 2024-11-25 PROCEDURE — 99214 OFFICE O/P EST MOD 30 MIN: CPT | Mod: PBBFAC | Performed by: INTERNAL MEDICINE

## 2024-11-25 RX ORDER — FOLIC ACID 1 MG/1
TABLET ORAL
COMMUNITY

## 2024-11-25 RX ORDER — PANTOPRAZOLE SODIUM 40 MG/1
40 FOR SUSPENSION ORAL DAILY
Qty: 90 PACKET | Refills: 3 | Status: SHIPPED | OUTPATIENT
Start: 2024-11-25

## 2024-11-25 RX ORDER — IPRATROPIUM BROMIDE AND ALBUTEROL SULFATE 2.5; .5 MG/3ML; MG/3ML
SOLUTION RESPIRATORY (INHALATION)
COMMUNITY

## 2024-11-25 RX ORDER — MYCOPHENOLATE MOFETIL 200 MG/ML
1500 POWDER, FOR SUSPENSION ORAL 2 TIMES DAILY
Qty: 450 ML | Refills: 3 | Status: SHIPPED | OUTPATIENT
Start: 2024-11-25

## 2024-11-25 RX ORDER — AZATHIOPRINE 50 MG/1
50 TABLET ORAL DAILY
Qty: 30 TABLET | Refills: 3 | Status: SHIPPED | OUTPATIENT
Start: 2024-11-25 | End: 2025-11-25

## 2024-11-25 RX ORDER — PREDNISONE 5 MG/1
TABLET ORAL
Qty: 30 TABLET | Refills: 3 | Status: SHIPPED | OUTPATIENT
Start: 2024-11-25

## 2024-11-25 RX ORDER — PREDNISONE 2.5 MG/1
TABLET ORAL
Qty: 30 TABLET | Refills: 3 | Status: SHIPPED | OUTPATIENT
Start: 2024-11-25

## 2024-11-25 RX ORDER — ACETAMINOPHEN 500 MG
1 TABLET ORAL
COMMUNITY

## 2024-11-25 NOTE — PROGRESS NOTES
Patient ID: [id number]     Chief Complaint: Follow-up (Dx: Anti synthetase syndrome/Pain: Intermittent above  Rt Buttock/Rt Hip  Rates 9/10/Plans to discuss with you:/Medications: Pantoprazole she has been throwing up no matter what time she takes/Tingles in feet and numbness/Irritated eyes/Nose runs all night especially when she is eating/Memory loss)      HPI:     Devi Fernandes is a 73 y.o. 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       She was diagnosed with Lupus in 2008. She had seen Rheumatologist in [place] in the past, does not remember the name. She was treated with prednisone. She was treated with some medications back then, she does not remember the name on medications. She may have been on lmuran, methotrexate and hydroxychloroquine in the past. Used higher dose of prednisone in the past. She was on prednisone daily in the past.     She was admitted at [facility] in [facility] in April 2023, shows admitted for Rhabdomyolysis due to COVID, she was treated with steroids. Reviewed discharge summary.     She has dysphagia, cannot take tablets, she is taking liquid CellCept, 1500 mg twice daily. Tolerating it well.  She had another flare with elevated CPK and aldolase in March 20, 2024, added Prograf but she has severe diarrhea with it.  Discontinued Prograf and started her on Imuran 50 mg daily in April 20, 2024.  Currently prednisone dose is back down to her baseline dose 7.5 mg daily.  She had flare of anti synthetase syndrome in 8/2022, added IVIG. She took IVIG for few months and stopped in November 2022, she does not like to get those infusions, she does not find them helpful(even though flare was improved with it, CPK normalized) she does not like to continue it and said she will consider it if she flares again.   Dr Llanes had discussed OFEV, she has not started it yet because of dysphagia, she can not swallow capsules. Had seen Neurology and ENT, Dr Miranda for dysphagia and  "dysphonia.   She had seen Dr. Llanes recently, she had PFTs done in 1st week of August.  Morning stiffness for 5 min. Denies red, warm and swollen joints.   She follows with Neurology Dr. Goss, switched metoprolol to propranolol for tremors.  Admits swallowing issues since 2017. She has to swallow small pieces. Swallowing issues getting worse. She chokes on solids and liquids. Food does not get stuck in chest intermittently. Symptoms are stable.   Admits chronic shortness of breath. Denies productive cough. Patient thinks SOB slightly worse than before.  Shortness of breath worse with walking but denies shortness of breath when she does aerobic exercise.  Admits color changes in hands for the last 15 years, since 2007. Cold is trigger. She will have when its cold, could be twice month or every day if its cold. Episode lasts for 15 min. Fingers never turn white, always purple in tips.   She had surgery for vocal cord polyps in 2017 and since then she has dysphonia. Voice issues are same since 2017, not getting worse. She follows with Dr Miranda.     November 2024: 2 months of tingling and numbness in lower legs, no trouble walking. Takes Protonix however it does not stay down. Denies reflux symptoms. Takes azathioprine 50 mg daily, mycophrenolate 1500 mg BID, folic acid 1 mg daily, prednisone 7.5 mg daily. Claims to have seen a GI doctor in [place], Dr. Aguilar, and she recommended PEG tube however patient refused. Swallowing issue a "little" worse, both solids and liquids. Denies SOB at rest however has chronic SOB when walking. Hands turn purple when cold, lasts for 15 minutes. Sees Dr. Llanes every 3 months, he wants her on Ofev however she cannot take pills due to swallowing problems. She does not use home oxygen.    Follows with neurology clinic of [place] Dr. Escamilla: "EMG showed no spontaneous activity to suggest LMN involvement. MG panel was negative. Imaging shows no findings to explain brisk reflexes. " "I do see early recruitement and myopathic MUAPs on EMG today, suspect she does have mild muscle involvement (mild neck flexion weakness and mild delt weakness bilaterally). 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 Continue close f/u with rheumatology."    Denies any recent illness or hospitalizations since last visit.     Denies fevers, oral or nasal ulcers, rashes, photosensitivity, history of DVT or PE, history of stroke or seizures, history of Ml, history of inflammatory eye diseases, history of malignancy.   Autoimmune diseases in family: none.   Miscarriages: 2, first trimester miscarriages. Pregnancies: 3   Smoking: nonsmoker.     Social History     Tobacco Use   Smoking Status Never   Smokeless Tobacco Never          -------------------------------------    Allergy    Aortic regurgitation    Arthritis    BMI 22.0-22.9, adult    Deviated nasal septum    Diabetes mellitus    Dysphagia    GERD (gastroesophageal reflux disease)    Hypertension    PAOLA (obstructive sleep apnea)    Pulmonary fibrosis    Pulmonary hypertension    Pulmonic regurgitation    Restrictive lung disease    Sinus bradycardia    Valvular heart disease        Past Surgical History:   Procedure Laterality Date    APPENDECTOMY      CARDIAC CATHETERIZATION  2017     SECTION      COLONOSCOPY  2011    ESOPHAGEAL MANOMETRY  2018    EYE SURGERY  2021    Cataract surgery    GASTROSCOPY  02/15/2018    HYSTERECTOMY  2003    Vocal Cord Surgery  2017    cyst removed       Review of patient's allergies indicates:   Allergen Reactions    Codeine Other (See Comments)     Becomes weak and over heated.   Other reaction(s): unknown  Hot flashes       Outpatient Medications Marked as Taking for the 24 encounter (Office Visit) with Rad Manzanares MD   Medication Sig Dispense Refill    albuterol-ipratropium (DUO-NEB) 2.5 mg-0.5 mg/3 mL nebulizer solution 3 mL as needed Inhalation every 6 hrs      amLODIPine (NORVASC) 5 " MG tablet Take 1 tablet by mouth once daily.      biotin 5,000 mcg Chew Take 1 tablet by mouth Daily. Pt stated doesn't take it regularly      carvediloL (COREG) 3.125 MG tablet Take 1 tablet twice a day by oral route.      cholecalciferol, vitamin D3, (VITAMIN D3) 50 mcg (2,000 unit) Cap capsule 1 capsule.      folic acid (FOLVITE) 1 MG tablet Take 1 tablet (1 mg total) by mouth once daily. 90 tablet 1    folic acid (FOLVITE) 1 MG tablet 1 tablet Orally Once a day      ipratropium (ATROVENT) 42 mcg (0.06 %) nasal spray 2 sprays 2 (two) times daily as needed.      propranoloL (INDERAL) 40 MG tablet Take 40 mg by mouth 2 (two) times daily.      propylene glycol (SYSTANE BALANCE OPHT) Apply 1 drop to eye daily as needed.      triamcinolone (NASACORT) 55 mcg nasal inhaler 2 sprays by Nasal route once daily.      vit A/vit D3/vit E/vit K1/zinc (ADEK GUMMIES PLUS ZINC ORAL) Take by mouth.      [DISCONTINUED] azaTHIOprine (IMURAN) 50 mg Tab Take 1 tablet (50 mg total) by mouth once daily. Hold for fever or infections. 30 tablet 3    [DISCONTINUED] mycophenolate mofetil (CELLCEPT) 200 mg/mL SusR Take 7.5 mLs (1,500 mg total) by mouth 2 (two) times daily. 450 mL 3    [DISCONTINUED] predniSONE (DELTASONE) 2.5 MG tablet Take 1 tab po qd with 5 mg tab for a total of 7.5 mg po qd 30 tablet 3    [DISCONTINUED] predniSONE (DELTASONE) 5 MG tablet Take 7.5 mg po qd. 30 tablet 3       Social History     Socioeconomic History    Marital status:    Tobacco Use    Smoking status: Never    Smokeless tobacco: Never   Substance and Sexual Activity    Alcohol use: No    Drug use: No    Sexual activity: Not Currently     Partners: Female     Birth control/protection: Abstinence     Social Drivers of Health     Financial Resource Strain: Low Risk  (4/19/2023)    [facility]    Overall Financial Resource Strain (CARDIA)     Difficulty of Paying Living Expenses: Not hard at all    Food Insecurity: No Food Insecurity (4/19/2023)    [facility]    Hunger Vital Sign     Worried About Running Out of Food in the Last Year: Never true     Ran Out of Food in the Last Year: Never true   Transportation Needs: No Transportation Needs (4/19/2023)    [facility]    PRAPARE - Transportation     Lack of Transportation (Medical): No     Lack of Transportation (Non-Medical): No        Family History   Problem Relation Name Age of Onset    Cancer Mother Mrs. Sade Fernandes     Diabetes Mellitus Father Lanre Fernandes, Sr.     Diabetes Father Lanre Fernandes, Sr.     Vision loss Father Lanre Fernandes, Sr.     Cancer Sister      Breast cancer Sister  66        Left breast        Immunization History   Administered Date(s) Administered    COVID-19, MRNA, LN-S, PF (MODERNA FULL 0.5 ML DOSE) 10/30/2021, 04/12/2022, 08/06/2022    Influenza (FLUAD) - Quadrivalent - Adjuvanted - PF *Preferred* (65+) 11/03/2022, 10/06/2023    Influenza - Trivalent - Afluria, Fluzone MDV 09/30/2020    Influenza - Trivalent - Fluad - Adjuvanted - PF (65 years and older 10/10/2018    Influenza - Trivalent - Fluzone High Dose - PF (65 years and older) 12/04/2017, 09/17/2019, 09/30/2020, 10/21/2021    Pneumococcal Conjugate - 13 Valent 02/06/2019    Pneumococcal Polysaccharide - 23 Valent 11/03/2022    RSVpreF (Arexvy) 11/06/2023    Td (Adult), Unspecified Formulation 10/21/2020    Tdap 02/06/2019    Zoster Recombinant 11/03/2022, 05/09/2023       Patient Care Team:  Ariana Grewal MD as PCP - General (Internal Medicine)  Mindy Aguilar MD as Consulting Physician (Gastroenterology)     Subjective:       Constitutional:  Denies chills. Denies fever. Denies night sweats. Denies weight loss.   Ophthalmology: Denies blurred vision. Denies dry eyes. Denies eye pain. Denies Itching and redness.   ENT: Denies  "oral ulcers. Denies epistaxis. Denies dry mouth. Denies swollen glands.   Endocrine: Denies diabetes. Denies thyroid Problems.   Respiratory: Denies cough. Denies shortness of breath. Admits shortness of breath with exertion. Denies hemoptysis.   Cardiovascular: Denies chest pain at rest. Denies chest pain with exertion. Denies palpitations- has resolved with med increase by Cardiology  Gastrointestinal: Denies abdominal pain. Denies nausea. Denies vomiting. Denies hematemesis or hematochezia. Denies heartburn.  Genitourinary: Denies blood in urine.  Musculoskeletal: See HPI for details  Integumentary: Denies rash. Denies photosensitivity.   Peripheral Vascular: Denies Ulcers of hands and/or feet. Denies Cold extremities.   Neurologic: Denies dizziness. Denies headache.  Denies loss of strength. Denies numbness or tingling.   Psychiatric: Denies depression. Denies anxiety. Denies suicidal/homicidal ideations.      Objective:     BP (!) 157/70 (BP Location: Left arm, Patient Position: Sitting)   Pulse 60   Temp 98 °F (36.7 °C) (Oral)   Resp 20   Ht 5' 5" (1.651 m)   Wt 69.8 kg (153 lb 12.8 oz)   SpO2 100%   BMI 25.59 kg/m²     Physical Exam    General Appearance: alert, pleasant, in no acute distress.  Skin: Skin color, texture, turgor normal. No rashes or lesions. Few capillary dilatations, no drop outs.   Eyes:  extraocular movement intact (EOMI), pupils equal, round, reactive to light and accommodation, conjunctiva clear.  ENT: No oral or nasal ulcers.  Neck:  Neck supple. No adenopathy.   Lungs: fine crackles in bases, clear through out.   Heart: RRR.  No edema. Soft systolic murmur present.   Neuro: Alert, oriented, CN II-XII GI, sensory and motor innervation intact.  Psych: Alert, oriented, normal eye contact.    Joint Exam:  DIPs, PIPs, MCPs: no pain on palpation, no swelling or redness, no nodules. DJD of bilateral hands.   Wrists: no pain on palpation, no swelling or redness, good range of " motion.  Elbows: no pain on palpation, no swelling or redness, good range of motion, no nodules.  Feet: no pain on palpation, no swelling or redness, no nodules.  Back: right lower paraspinal muscle tenderness    Labs:   August 2021: CMP okay. CBC okay. SPEP showed polyclonal gammopathy and elevated beta proteins. 2/18/2022 :ESR, CRP normal. CBC okay. CMP okay. Globulin and protein slightly high. CPK normal.  mg/g. Rheumatoid factor and anti CCP negative. Positive RNP antibody, 4.6. Positive SSA, 2.7. Negative SCL 70, SSB, Smith, dsDNA, thyroglobulin, TPO, RNA polymerase 3, centromere antibodies. Aldolase normal. ANCA, MPO and WI-3 negative. Cardiolipin, beta-2 glycoprotein negative. LAC negative. Positive ISMAEL, nuclear, cytoplasmic and homogeneous pattern, 1:320. Positive myositis antibody, EJ 78, high titer, PL? is 12, Ml-2 is 17. C3, C4 normal. No blood and protein in urine.   3/24/22: Hep B, C, HIV, quantiferon tb negative.   5/3/2022 RDW elevated 15.0. CBC okay. Glucose 129. CMP okay. CPK and aldolase normal. ESR 24, CRP normal. TPMT low, low metaboilizer.   6/10/2022: CMP okay. LFTs normal. CPK and aldoalse normal. Hemoglobin 11.8. ESR 45, normal less than 30. CRP normal. No blood and protein in urine. C3, C4 okay.  mg/g.   8/17/2022- during hospitalization: AST elevated 72, otherwise CMP okay. CPK greater than 2000. Elevated WBC count, otherwise CBC okay. Small protein and no blood in urine.  8/25/2022: C3, C4 okay. Urine protein less than 15 mg/dL. No blood and protein in urine. Small leukocyte esterase. WBC count elevated secondary to steroids. WBC count 13.4, hemoglobin 11.5. Bicarb slightly elevated. CMP okay otherwise. CPK elevated 2076. ESR 40, CRP normal. Aldolase 28.   1/18/23: CMP ok. ESR 55, CRP normal. Hg 11.7. C3, C4 normal. CPK and aldolase normal.  3/27/23:  UA, upc okay.  CMP okay.  ESR 32.  WBC count 11.8.  CBC okay otherwise.  C3, C4 okay.  CRP normal.  Aldolase and CPK  normal.  23:  WBC count elevated 15.3.  Hemoglobin 11.0.  AST 49, ALC 76, upper limits of normal.  Calcium 8.3.  GFR normal.  CPK elevated 1096.  BNP normal.  No protein and blood in urine.  Labs done by her PCP, Dr. Rebollar.  2023; trace protein and no blood in urine.  Upc okay.  CMP okay.  ESR 22.  Hemoglobin 11.0.  CBC okay otherwise.  C3, C4 normal.  CPK and aldolase normal.  CRP normal.  23:  CPK normal.  UPC okay.  Trace protein and no blood in urine urine.  CRP 14.6.  CMP okay.  ESR 33.  Hemoglobin 11.9.  Aldolase normal.  C3, C4 okay  24: Hg 11.1.  AST 42.  CPK 7,946. Aldolase 44.  24:  CMP okay.  Hemoglobin 11.9.  CPK normal. Aldolase normal  2024 CRP 7.40 (<5), CMP ok. Sed Rate 55 (<20), CBC WBC 16.66 ANC 13.21, otherwise ok. CPK 68 ok, UPC ok. Urine no blood or protein noted. C3/C4 ok. Aldolase 6.1 ok, TPMT normal metabolizer.  2024 CMP ok, CBC hgb 10.8, hct 36.5, otherwise ok.     Imagin2021: CT neck soft tissue: no soft tissue abnormalities. Of cervical spine. No enlarged lymph nodes. Thyroid normal.   Retropharyngeal space unremarkable. Normal larynx.   Swallowing study: 10/2021: No penetration or aspiration. Speech pathology evaluation showed moderate to severe oropharyngeal dysphagia, lingual weakness, reduced range of motion and coordination, pharyngeal delay, refused hypo laryngeal elevation, reduced epiglottic inversion and pharyngeal squeeze. Recommend softer fine chopped meats thin liquids, no straws. Crushed meds in puree consistency. Aspiration precautions. ENT and neurological evaluation is recommended.     22: Chest x-ray showed coarse reticular opacities in the mid and lower lungs, unchanged from 2021. Calcaneal spurring bilaterally, no erosions. Normal x-ray of bilateral knees. Normal x ray of bilateral hands, no erosions.     ECHO:  Echocardiogram 2021: Concentric left ventricular hypertrophy. Diastolic dysfunction. Mild aortic  insufficiency. Moderate pulmonary hypertension. EF 55 to 60%. PASP 41 mmHg.   03/15/2023:  Echocardiogram 08/30/2022 showed normal systolic function, EF 60-65%, grade 1 diastolic dysfunction, PASP 34 mmHg.   Echocardiogram 03/06/2024 ejection fraction 66%, grade 1 diastolic dysfunction.  PASP 34 mmHg.  No pericardial effusion.    CT CHEST:   3/16/2022 :CT chest showed old granulomatous disease. Predominantly bibasilar interstitial pulmonary fibrosis. Minimal peripheraI honeycombing. Extensive reticuIonoduIar Iung changes and small calcified lung nodules. Bronchiectasis present. Slightly enlarged mediastinal lymph nodes.   CT chest on 03/15/2023 showed stable pulmonary fibrosis with peripheral honeycombing, bronchiectasis, old granulomatous disease, stable right lateral chest subpleural dense opacity associated with posterior fissures.  Fine reticular lung markings with ground-glass opacity and ground-glass density.  Stable small right upper lobes pleural nodule.    12/15/2023: CT chest showed old granulomatous disease.  Stable predominant basilar interstitial pulmonary fibrosis peripheral honeycombing.  Left upper lobe subpleural reticular markings, extensive left lower lobe reticulonodular changes, extensive right middle and right lower lobe nodular changes.  Course right upper subpleural lung markings.  CT chest 03/12/2024: bibasilar bronchiectatic changes, septal thickening and ground-glass opacities bilaterally.  Nurse small hyperdense foci in the base of lung which may be related to aspirated barium.  No honeycombing, no air trapping  5/3/24 showed stable very small lung nodules. Stable predominantly basilar interstitial fibrosis. Peripheral honeycombing.    PFTs:  10/28/2021: PFTs: FVC 71%, FEV1 83%, ratio 91%, total lung capacity 77%, DLCO 22%, DLCO/VA 44%.  2/14/23: PFTs: FVC 75%, FEV1 84%, ratio 86, total lung capacity 78%, DLCO 26%, DLCO/VA 52%.  09/18/2023 PFTs showed FVC 75%, FEV1 89%, ratio 92, total  lung capacity 63%, residual volume 56, DLCO 19, DLCO/VA 39.  5/3/24 showed no obstruction mild restriction and moderate diffusion defect that normalizes when corrected for alveolar volume FVC 86%, FEV1 89%, ratio 80%, TLC 70%, RV 65%, DLCO 50%, DL/VA 97%  8/6/24:  FVC 82%, FEV1 86%, ratio 82, total lung capacity 63%, DLCO 48%, DLCO by VA 83%.    Previous rheumatology records from [place]: Dr. Mynor Delarosa: Clinic visit in October 2020: Diagnosed with lupus, pulmonary fibrosis, pulmonary hypertension. She was treated with methotrexate and Plaquenil in the past. Patient has stable and ongoing shortness of PFTs showed markedly low DLCO. Off of her medications currently. Labs unremarkable. Need to order high resolution. Nonproductive cough. Muscle strength normal. Chest x ray October 2020. Dense reticulonodular fibrosis at the lung bases.       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 Mixed connective tissue disease  M35.1 710.8   3. Drug-induced immunodeficiency  D84.821 279.3    Z79.899 E947.9   4. ILD (interstitial lung disease)  J84.9 515   5. Dysphonia  R49.0 784.42   6. Pulmonary hypertension, unspecified  I27.20 416.8   7. Other dysphagia  R13.19 787.29            Plan:     73-year-old pleasant -American woman presents for follow up of lupus, rheumatoid arthritis, mixed connective tissue disease, interstitial lung disease, vocal cord dysfunction, dysphagia here for follow up of antisynthetase syndrome.     1. Antisynthetase syndrome: Positive myositis antibody, EJ 78, high titer, PL7 is 12, Ml-2 is 17. Positive myositis panel, questionable Raynaud's phenomenon, dysphagia, dysphonia. Intermittent elevation of CPK, had flare in 8/2022 and again most recently 2/2024. Discussed the disease course and treatment options of antisynthetase syndrome. Positive SSA ab associated with ILD and ATS, poor prognosis.  - Started IVIG infusions as she has hoarseness of voice  and dysphagia with elevated CPK. Recieved first dose of IVIG 2 g/kg, 130 mg total dose on 9/6/22. Tolerated IVIG well. She had received few more doses and stopped in 12/2022. She does not like to take infusions and will consider in the future if she flares again, refusing to take it currently.  - She had another small flare in March 20, 2024 with elevated CPK and aldolase after tapering prednisone down to 5 mg daily.  Prograf was added but she has not tolerated it due to diarrhea.  Added Imuran 50 mg daily in April 2024.  - Continue with Imuran 50 mg daily.  - C/w CellCept 1500 mg twice daily, 7.5 ml BID.    - Continue with prednisone 7.5 mg po qd- continue this dose, will not taper it now.  - ECHO stable 3/2024  - unable to take pantoprozole capsules, will switch it to pantoprozole granules. Patient denies reflux symptoms but I recommend continuing PPI to dysphagia and ILD.      2. Color changes in hands: Does not have typical color changes of Raynaud's phenomenon, saw pictures in her phone, hands turn dark when its cold. Few dilated capillary nail folds, no dropouts on exam.      3. ILD (interstitial lung disease): Interstitial lung disease: DLCO low, 22% in October 2021. CT chest showed bibasilar reticulonodular changes and mild honeycombing in March 2022. Admits chronic cough, SOB with exertion. She was referred to pulmonary, she had seen Dr Llanes. Continue close follow-up with Dr. Llanes. OFEV was discussed but never started it, due to swallowing issues.   - OFEV was prescribed but she can not tolerate it because of dysphagia, can not swallow capsules.  - Will continue to monitor.  - Keep apt with Dr. Llanes.       4. Dysphonia/dysphagia: Dysphonia is stable. Dysphagia is getting worse. Dysphagia and dysphonia secondary to antisynthetase syndrome. Swallow evaluation showed severe oropharyngeal dysphagia. Recommend ENT and neurological evaluation. She had seen neurologist Dr Francine Goss and ENT Dr Miranda.   -  Advised to follow up with ENT.     5. Mixed connective tissue disease: She was treated with lmuran, methotrexate and Plaquenil in the past. Positive ISMAEL, nuclear, cytoplasmic and homogeneous pattern, 1:320. Positive RNP antibody, 4.6. Positive SSA, 2.7. Positive ISMAEL and SSA antibody likely associated with antisynthetase syndrome. She has features of mixed connective tissue disease and antisynthetase syndrome.   - Check complements and UA every 6 months.      7. Heart murmur, history of heart issues/Pulmonary HTN: Requested and reviewed records from cardiology Dr Warren. Echocardiogram in September 2021 showed diastolic dysfunction, normal systolic function and elevated PASP 41 mmHg. Continue close follow-up with Cardiology and pulmonary.      8. High risk medication use/Drug Induced Immunodeficency: Persons with rheumatoid arthritis, lupus, psoriatic arthritis and other autoimmune diseases are at increased risk of cardiovascular disease including heart attack and stroke. We recommend that all patients with these conditions have annual health maintenance exams including lipid measurements, blood pressure measurements, and smoking cessation counseling when applicable at their primary care provider's office.   -Mammogram UTD, PAPs completed due to age, Colonoscopy scheduled for 11/2024  - Advised to stay up-to-date on age appropriate vaccinations and malignancy screening, including yearly skin exams.    - Reviewed uptodate on vaccines, zoster, pneumovax, reviewed. Received RSV vaccine.      9. Bone health/Postmenopausal: Advised to take multivitamin and calcium supplementations. She had DXA done in 8/2021, Dr Rebollar monitoring it.   - She had fractured left 4th metatarsal in winter 2022, when she tripped while getting out of car wearing tight heels with straps.   - PCP managing this.        10. Bilateral lower leg tingling and numbness  -Follow with neurology for further management       11. Right paraspinal muscle  pain  -Heat, massage, pain meds as needed  -Advised exercise  -Continue to monitor closely         Follow up in about 3 months (around 2/19/2025). In addition to their scheduled follow up, the patient has also been instructed to follow up on as needed basis.      Total time spent with patient and documentation is 40 minutes. All questions were answered to patient's satisfaction and patient verbalized understanding.           Melany Giles MD  [facility] Internal Medicine PGY-III

## 2024-11-26 LAB — ALDOLASE SERPL-CCNC: 4.7 U/L

## 2025-02-19 ENCOUNTER — TELEPHONE (OUTPATIENT)
Dept: RHEUMATOLOGY | Facility: CLINIC | Age: 75
End: 2025-02-19
Payer: MEDICARE

## 2025-02-19 ENCOUNTER — LAB VISIT (OUTPATIENT)
Dept: LAB | Facility: HOSPITAL | Age: 75
End: 2025-02-19
Attending: INTERNAL MEDICINE
Payer: MEDICARE

## 2025-02-19 ENCOUNTER — OFFICE VISIT (OUTPATIENT)
Dept: RHEUMATOLOGY | Facility: CLINIC | Age: 75
End: 2025-02-19
Payer: MEDICARE

## 2025-02-19 VITALS
DIASTOLIC BLOOD PRESSURE: 65 MMHG | SYSTOLIC BLOOD PRESSURE: 139 MMHG | BODY MASS INDEX: 25.99 KG/M2 | TEMPERATURE: 98 F | RESPIRATION RATE: 20 BRPM | OXYGEN SATURATION: 99 % | HEART RATE: 67 BPM | WEIGHT: 156 LBS | HEIGHT: 65 IN

## 2025-02-19 DIAGNOSIS — M35.1 MIXED CONNECTIVE TISSUE DISEASE: ICD-10-CM

## 2025-02-19 DIAGNOSIS — D84.821 DRUG-INDUCED IMMUNODEFICIENCY: ICD-10-CM

## 2025-02-19 DIAGNOSIS — R13.19 OTHER DYSPHAGIA: ICD-10-CM

## 2025-02-19 DIAGNOSIS — Z79.899 DRUG-INDUCED IMMUNODEFICIENCY: ICD-10-CM

## 2025-02-19 DIAGNOSIS — R49.0 DYSPHONIA: ICD-10-CM

## 2025-02-19 DIAGNOSIS — Z78.0 POSTMENOPAUSAL: ICD-10-CM

## 2025-02-19 DIAGNOSIS — D89.89 ANTISYNTHETASE SYNDROME: Primary | ICD-10-CM

## 2025-02-19 DIAGNOSIS — J84.9 ILD (INTERSTITIAL LUNG DISEASE): ICD-10-CM

## 2025-02-19 DIAGNOSIS — I27.20 PULMONARY HYPERTENSION, UNSPECIFIED: ICD-10-CM

## 2025-02-19 DIAGNOSIS — D89.89 ANTISYNTHETASE SYNDROME: ICD-10-CM

## 2025-02-19 LAB
ALBUMIN SERPL-MCNC: 4.1 G/DL (ref 3.4–4.8)
ALBUMIN/GLOB SERPL: 0.9 RATIO (ref 1.1–2)
ALP SERPL-CCNC: 77 UNIT/L (ref 40–150)
ALT SERPL-CCNC: 11 UNIT/L (ref 0–55)
ANION GAP SERPL CALC-SCNC: 5 MEQ/L
AST SERPL-CCNC: 21 UNIT/L (ref 5–34)
BASOPHILS # BLD AUTO: 0.04 X10(3)/MCL
BASOPHILS NFR BLD AUTO: 0.5 %
BILIRUB SERPL-MCNC: 0.4 MG/DL
BUN SERPL-MCNC: 7.2 MG/DL (ref 9.8–20.1)
CALCIUM SERPL-MCNC: 10.1 MG/DL (ref 8.4–10.2)
CHLORIDE SERPL-SCNC: 108 MMOL/L (ref 98–107)
CK SERPL-CCNC: 85 U/L (ref 29–168)
CO2 SERPL-SCNC: 29 MMOL/L (ref 23–31)
CREAT SERPL-MCNC: 0.74 MG/DL (ref 0.55–1.02)
CREAT/UREA NIT SERPL: 10
EOSINOPHIL # BLD AUTO: 0.01 X10(3)/MCL (ref 0–0.9)
EOSINOPHIL NFR BLD AUTO: 0.1 %
ERYTHROCYTE [DISTWIDTH] IN BLOOD BY AUTOMATED COUNT: 15.2 % (ref 11.5–17)
GFR SERPLBLD CREATININE-BSD FMLA CKD-EPI: >60 ML/MIN/1.73/M2
GLOBULIN SER-MCNC: 4.5 GM/DL (ref 2.4–3.5)
GLUCOSE SERPL-MCNC: 114 MG/DL (ref 82–115)
HCT VFR BLD AUTO: 41.5 % (ref 37–47)
HGB BLD-MCNC: 12.5 G/DL (ref 12–16)
IMM GRANULOCYTES # BLD AUTO: 0.02 X10(3)/MCL (ref 0–0.04)
IMM GRANULOCYTES NFR BLD AUTO: 0.2 %
LYMPHOCYTES # BLD AUTO: 1.19 X10(3)/MCL (ref 0.6–4.6)
LYMPHOCYTES NFR BLD AUTO: 14 %
MCH RBC QN AUTO: 25.6 PG (ref 27–31)
MCHC RBC AUTO-ENTMCNC: 30.1 G/DL (ref 33–36)
MCV RBC AUTO: 85 FL (ref 80–94)
MONOCYTES # BLD AUTO: 0.28 X10(3)/MCL (ref 0.1–1.3)
MONOCYTES NFR BLD AUTO: 3.3 %
NEUTROPHILS # BLD AUTO: 6.95 X10(3)/MCL (ref 2.1–9.2)
NEUTROPHILS NFR BLD AUTO: 81.9 %
NRBC BLD AUTO-RTO: 0 %
PLATELET # BLD AUTO: 270 X10(3)/MCL (ref 130–400)
PMV BLD AUTO: 11 FL (ref 7.4–10.4)
POTASSIUM SERPL-SCNC: 4.6 MMOL/L (ref 3.5–5.1)
PROT SERPL-MCNC: 8.6 GM/DL (ref 5.8–7.6)
RBC # BLD AUTO: 4.88 X10(6)/MCL (ref 4.2–5.4)
SODIUM SERPL-SCNC: 142 MMOL/L (ref 136–145)
WBC # BLD AUTO: 8.49 X10(3)/MCL (ref 4.5–11.5)

## 2025-02-19 PROCEDURE — 82550 ASSAY OF CK (CPK): CPT

## 2025-02-19 PROCEDURE — 36415 COLL VENOUS BLD VENIPUNCTURE: CPT

## 2025-02-19 PROCEDURE — 82085 ASSAY OF ALDOLASE: CPT

## 2025-02-19 PROCEDURE — 85025 COMPLETE CBC W/AUTO DIFF WBC: CPT

## 2025-02-19 PROCEDURE — 99214 OFFICE O/P EST MOD 30 MIN: CPT | Mod: PBBFAC | Performed by: INTERNAL MEDICINE

## 2025-02-19 PROCEDURE — 80053 COMPREHEN METABOLIC PANEL: CPT

## 2025-02-19 PROCEDURE — 99215 OFFICE O/P EST HI 40 MIN: CPT | Mod: S$PBB,,, | Performed by: INTERNAL MEDICINE

## 2025-02-19 RX ORDER — PREDNISONE 5 MG/1
TABLET ORAL
Qty: 30 TABLET | Refills: 3 | Status: SHIPPED | OUTPATIENT
Start: 2025-02-19

## 2025-02-19 RX ORDER — MYCOPHENOLATE MOFETIL 200 MG/ML
1500 POWDER, FOR SUSPENSION ORAL 2 TIMES DAILY
Qty: 450 ML | Refills: 3 | Status: SHIPPED | OUTPATIENT
Start: 2025-02-19

## 2025-02-19 RX ORDER — AZATHIOPRINE 50 MG/1
50 TABLET ORAL DAILY
Qty: 30 TABLET | Refills: 3 | Status: SHIPPED | OUTPATIENT
Start: 2025-02-19 | End: 2026-02-19

## 2025-02-19 RX ORDER — PANTOPRAZOLE SODIUM 40 MG/1
40 FOR SUSPENSION ORAL DAILY
Qty: 90 PACKET | Refills: 6 | Status: SHIPPED | OUTPATIENT
Start: 2025-02-19

## 2025-02-19 RX ORDER — PREDNISONE 2.5 MG/1
TABLET ORAL
Qty: 30 TABLET | Refills: 3 | Status: SHIPPED | OUTPATIENT
Start: 2025-02-19

## 2025-02-19 NOTE — PROGRESS NOTES
Patient ID: [id number]     Chief Complaint: Antisynthetase syndrome (Pt stated 1 month ago was having pain to the left side of head. Denies any pain to left side of at this time. Stated SOB  with walking has gotten worse in the last 1 1/2 month.)      HPI:     Devi Fernandes is a 74 y.o. 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       She was diagnosed with Lupus in 2008. She had seen Rheumatologist in [place] in the past, does not remember the name. She was treated with prednisone. She was treated with some medications back then, she does not remember the name on medications. She may have been on lmuran, methotrexate and hydroxychloroquine in the past. Used higher dose of prednisone in the past. She was on prednisone daily in the past.     She was admitted at [facility] in [facility] in April 2023, shows admitted for Rhabdomyolysis due to COVID, she was treated with steroids. Reviewed discharge summary.     She is here for follow-up today.  Shortness of breath slightly worse for last 2-3 months, since it has been cold. She is getting winded sooner than before.  But she has not been doing exercises due to cold weather, shortness of breath could be worse because she is out of shape.  Denies productive cough.  She has dysphagia, cannot take tablets, she is taking liquid CellCept, 1500 mg twice daily. Tolerating it well.  She had flare with elevated CPK and aldolase in March, 2024, added Prograf but she has severe diarrhea with it.  Discontinued Prograf and started her on Imuran 50 mg daily in April, 2024.  Tolerating Imuran well without any issues.  Currently prednisone dose is back down to her baseline dose 7.5 mg daily.  She had flare of anti synthetase syndrome in 8/2022, added IVIG. She took IVIG for few months and stopped in November 2022, she does not like to get those infusions, she does not find them helpful(even though flare was improved with it, CPK normalized) she does not like to  "continue it and said she will consider it if she flares again.   Dr Llanes had discussed OFEV, she has not started it yet because of dysphagia, she can not swallow capsules. OFEV can not be crushed.   Had seen Neurology and ENT, Dr Miranda for dysphagia and dysphonia.   She had seen Dr. Llanes in jan 2025, she had CT chest done on 1/6/25, requested records.  Morning stiffness for 5 min. Denies red, warm and swollen joints.   She follows with Neurology Dr. Goss, switched metoprolol to propranolol for tremors.  Admits swallowing issues since 2017. She has to swallow small pieces. Swallowing issues getting worse. She chokes on solids and liquids. Food does not get stuck in chest intermittently. Symptoms are stable.   Admits color changes in hands for the last 15 years, since 2007. Cold is trigger. She will have when its cold, could be twice month or every day if its cold. Episode lasts for 15 min. Fingers never turn white, always purple in tips.   She had surgery for vocal cord polyps in 2017 and since then she has dysphonia. Voice issues are same since 2017, not getting worse. She follows with Dr Miranda.   Complaining of intermittent sharp headaches on left side of scalp lasting for few seconds, she has these episodes once a week.  Advised to talk to her PCP and with her neurologist.  Follows with neurology clinic [facility] Dr. Escamilla: "EMG showed no spontaneous activity to suggest LMN involvement. MG panel was negative. Imaging shows no findings to explain brisk reflexes. I do see early recruitement and myopathic MUAPs on EMG today, suspect she does have mild muscle involvement (mild neck flexion weakness and mild delt weakness bilaterally). 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 Continue close f/u with rheumatology."    Denies any recent illness or hospitalizations since last visit.     Denies fevers, oral or nasal ulcers, rashes, photosensitivity, history of DVT or PE, history of stroke or seizures, " history of Ml, history of inflammatory eye diseases, history of malignancy.   Autoimmune diseases in family: none.   Miscarriages: 2, first trimester miscarriages. Pregnancies: 3   Smoking: nonsmoker.     Social History     Tobacco Use   Smoking Status Never   Smokeless Tobacco Never          -------------------------------------    Allergy    Aortic regurgitation    Arthritis    BMI 22.0-22.9, adult    Deviated nasal septum    Diabetes mellitus    Dysphagia    GERD (gastroesophageal reflux disease)    Hypertension    PAOLA (obstructive sleep apnea)    Pulmonary fibrosis    Pulmonary hypertension    Pulmonic regurgitation    Restrictive lung disease    Sinus bradycardia    Valvular heart disease        Past Surgical History:   Procedure Laterality Date    APPENDECTOMY      CARDIAC CATHETERIZATION  2017     SECTION      COLONOSCOPY  2011    ESOPHAGEAL MANOMETRY  2018    EYE SURGERY  2021    Cataract surgery    GASTROSCOPY  02/15/2018    HYSTERECTOMY  2003    Vocal Cord Surgery  2017    cyst removed       Review of patient's allergies indicates:   Allergen Reactions    Codeine Other (See Comments)     Becomes weak and over heated.   Other reaction(s): unknown  Hot flashes       Outpatient Medications Marked as Taking for the 25 encounter (Office Visit) with Rad Manzanares MD   Medication Sig Dispense Refill    albuterol-ipratropium (DUO-NEB) 2.5 mg-0.5 mg/3 mL nebulizer solution 3 mL as needed Inhalation every 6 hrs      amLODIPine (NORVASC) 5 MG tablet Take 1 tablet by mouth once daily.      carvediloL (COREG) 3.125 MG tablet Take 1 tablet twice a day by oral route.      cholecalciferol, vitamin D3, (VITAMIN D3) 50 mcg (2,000 unit) Cap capsule 1 capsule.      folic acid (FOLVITE) 1 MG tablet Take 1 tablet (1 mg total) by mouth once daily. 90 tablet 1    pantoprazole (PROTONIX) 40 mg suspension Take 1 packet (40 mg total) by mouth once daily. 90 packet 3    propranoloL (INDERAL) 40  MG tablet Take 40 mg by mouth 2 (two) times daily.      propylene glycol (SYSTANE BALANCE OPHT) Apply 1 drop to eye daily as needed.      triamcinolone (NASACORT) 55 mcg nasal inhaler 2 sprays by Nasal route once daily.      vit A/vit D3/vit E/vit K1/zinc (ADEK GUMMIES PLUS ZINC ORAL) Take by mouth.      [DISCONTINUED] azaTHIOprine (IMURAN) 50 mg Tab Take 1 tablet (50 mg total) by mouth once daily. Hold for fever or infections. 30 tablet 3    [DISCONTINUED] mycophenolate mofetil (CELLCEPT) 200 mg/mL SusR Take 7.5 mLs (1,500 mg total) by mouth 2 (two) times daily. 450 mL 3    [DISCONTINUED] predniSONE (DELTASONE) 2.5 MG tablet Take 1 tab po qd with 5 mg tab for a total of 7.5 mg po qd 30 tablet 3    [DISCONTINUED] predniSONE (DELTASONE) 5 MG tablet Take 7.5 mg po qd. 30 tablet 3       Social History     Socioeconomic History    Marital status:    Tobacco Use    Smoking status: Never    Smokeless tobacco: Never   Substance and Sexual Activity    Alcohol use: No    Drug use: No    Sexual activity: Not Currently     Partners: Female     Birth control/protection: Abstinence     Social Drivers of Health     Financial Resource Strain: Low Risk  (4/19/2023)    Received from [facility]    Overall Financial Resource Strain (CARDIA)     Difficulty of Paying Living Expenses: Not hard at all   Food Insecurity: No Food Insecurity (4/19/2023)    Received from [facility]    Hunger Vital Sign     Worried About Running Out of Food in the Last Year: Never true     Ran Out of Food in the Last Year: Never true   Transportation Needs: No Transportation Needs (4/19/2023)    Received from [facility]    PRAPARE - Transportation     Lack of Transportation (Medical): No     Lack of Transportation (Non-Medical): No        Family History   Problem Relation Name Age of Onset    Cancer Mother Mrs. Sade Fernandes     Diabetes Mellitus Father Lanre Fernandes Sr.     Diabetes Father  Lanre Fernandes, Sr.     Vision loss Father Lanre Fernandes, Sr.     Cancer Sister      Breast cancer Sister  66        Left breast        Immunization History   Administered Date(s) Administered    COVID-19, MRNA, LN-S, PF (MODERNA FULL 0.5 ML DOSE) 10/30/2021, 04/12/2022, 08/06/2022    Influenza (FLUAD) - Quadrivalent - Adjuvanted - PF *Preferred* (65+) 11/03/2022, 10/06/2023    Influenza - Trivalent - Afluria, Fluzone MDV 09/30/2020    Influenza - Trivalent - Fluad - Adjuvanted - PF (65 years and older 10/10/2018    Influenza - Trivalent - Fluzone High Dose - PF (65 years and older) 12/04/2017, 09/17/2019, 09/30/2020, 10/21/2021    Pneumococcal Conjugate - 13 Valent 02/06/2019    Pneumococcal Polysaccharide - 23 Valent 11/03/2022    RSVpreF (Arexvy) 11/06/2023    Td (Adult), Unspecified Formulation 10/21/2020    Tdap 02/06/2019    Zoster Recombinant 11/03/2022, 05/09/2023       Patient Care Team:  Ariana Grewal MD as PCP - General (Internal Medicine)  Mindy Aguilar MD as Consulting Physician (Gastroenterology)     Subjective:       Constitutional:  Denies chills. Denies fever. Denies night sweats. Denies weight loss.   Ophthalmology: Denies blurred vision. Denies dry eyes. Denies eye pain. Denies Itching and redness.   ENT: Denies oral ulcers. Denies epistaxis. Denies dry mouth. Denies swollen glands.   Endocrine: Denies diabetes. Denies thyroid Problems.   Respiratory: Denies cough. Denies shortness of breath. Admits shortness of breath with exertion. Denies hemoptysis.   Cardiovascular: Denies chest pain at rest. Denies chest pain with exertion. Denies palpitations- has resolved with med increase by Cardiology  Gastrointestinal: Denies abdominal pain. Denies nausea. Denies vomiting. Denies hematemesis or hematochezia. Denies heartburn.  Genitourinary: Denies blood in urine.  Musculoskeletal: See HPI for details  Integumentary: Denies rash. Denies photosensitivity.   Peripheral Vascular: Denies  "Ulcers of hands and/or feet. Denies Cold extremities.   Neurologic: Denies dizziness. Denies headache.  Denies loss of strength. Denies numbness or tingling.   Psychiatric: Denies depression. Denies anxiety. Denies suicidal/homicidal ideations.      Objective:     /65 (BP Location: Left arm, Patient Position: Sitting)   Pulse 67   Temp 98.2 °F (36.8 °C) (Oral)   Resp 20   Ht 5' 5" (1.651 m)   Wt 70.8 kg (156 lb)   SpO2 99%   BMI 25.96 kg/m²     Physical Exam    General Appearance: alert, pleasant, in no acute distress.  Skin: Skin color, texture, turgor normal. No rashes or lesions. Few capillary dilatations, no drop outs.   Eyes:  extraocular movement intact (EOMI), pupils equal, round, reactive to light and accommodation, conjunctiva clear.  ENT: No oral or nasal ulcers.  Neck:  Neck supple. No adenopathy.   Lungs: fine crackles in bases, clear through out.   Heart: RRR.  No edema. Soft systolic murmur present.   Neuro: Alert, oriented, CN II-XII GI, sensory and motor innervation intact.  Psych: Alert, oriented, normal eye contact.    Joint Exam:  DIPs, PIPs, MCPs: no pain on palpation, no swelling or redness, no nodules. DJD of bilateral hands.   Wrists: no pain on palpation, no swelling or redness, good range of motion.  Elbows: no pain on palpation, no swelling or redness, good range of motion, no nodules.  Feet: no pain on palpation, no swelling or redness, no nodules.  Back: right lower paraspinal muscle tenderness    Labs:   August 2021: CMP okay. CBC okay. SPEP showed polyclonal gammopathy and elevated beta proteins. 2/18/2022 :ESR, CRP normal. CBC okay. CMP okay. Globulin and protein slightly high. CPK normal.  mg/g. Rheumatoid factor and anti CCP negative. Positive RNP antibody, 4.6. Positive SSA, 2.7. Negative SCL 70, SSB, Smith, dsDNA, thyroglobulin, TPO, RNA polymerase 3, centromere antibodies. Aldolase normal. ANCA, MPO and MA-3 negative. Cardiolipin, beta-2 glycoprotein negative. " LAC negative. Positive ISMAEL, nuclear, cytoplasmic and homogeneous pattern, 1:320. Positive myositis antibody, EJ 78, high titer, PL? is 12, Ml-2 is 17. C3, C4 normal. No blood and protein in urine.   3/24/22: Hep B, C, HIV, quantiferon tb negative.   5/3/2022 RDW elevated 15.0. CBC okay. Glucose 129. CMP okay. CPK and aldolase normal. ESR 24, CRP normal. TPMT low, low metaboilizer.   6/10/2022: CMP okay. LFTs normal. CPK and aldoalse normal. Hemoglobin 11.8. ESR 45, normal less than 30. CRP normal. No blood and protein in urine. C3, C4 okay.  mg/g.   8/17/2022- during hospitalization: AST elevated 72, otherwise CMP okay. CPK greater than 2000. Elevated WBC count, otherwise CBC okay. Small protein and no blood in urine.  8/25/2022: C3, C4 okay. Urine protein less than 15 mg/dL. No blood and protein in urine. Small leukocyte esterase. WBC count elevated secondary to steroids. WBC count 13.4, hemoglobin 11.5. Bicarb slightly elevated. CMP okay otherwise. CPK elevated 2076. ESR 40, CRP normal. Aldolase 28.   1/18/23: CMP ok. ESR 55, CRP normal. Hg 11.7. C3, C4 normal. CPK and aldolase normal.  3/27/23:  UA, upc okay.  CMP okay.  ESR 32.  WBC count 11.8.  CBC okay otherwise.  C3, C4 okay.  CRP normal.  Aldolase and CPK normal.  5/2/23:  WBC count elevated 15.3.  Hemoglobin 11.0.  AST 49, ALC 76, upper limits of normal.  Calcium 8.3.  GFR normal.  CPK elevated 1096.  BNP normal.  No protein and blood in urine.  Labs done by her PCP, Dr. Rebollar.  07/25/2023; trace protein and no blood in urine.  Upc okay.  CMP okay.  ESR 22.  Hemoglobin 11.0.  CBC okay otherwise.  C3, C4 normal.  CPK and aldolase normal.  CRP normal.  11/7/23:  CPK normal.  UPC okay.  Trace protein and no blood in urine urine.  CRP 14.6.  CMP okay.  ESR 33.  Hemoglobin 11.9.  Aldolase normal.  C3, C4 okay  2/28/24: Hg 11.1.  AST 42.  CPK 7,946. Aldolase 44.  4/4/24:  CMP okay.  Hemoglobin 11.9.  CPK normal. Aldolase normal  4/30/2024 CRP 7.40 (<5),  CMP ok. Sed Rate 55 (<20), CBC WBC 16.66 ANC 13.21, otherwise ok. CPK 68 ok, UPC ok. Urine no blood or protein noted. C3/C4 ok. Aldolase 6.1 ok, TPMT normal metabolizer.  2024 CMP ok, CBC hgb 10.8, hct 36.5, otherwise ok.   24:  No protein and blood in urine.  Upc mg per g.  ESR 33.  CMP okay.  Hemoglobin 11.4.  CRP normal.  CPK and aldolase normal.  C3, C4 normal.  2024; CMP okay.  CBC okay.  CPK and aldolase normal.      Imagin2021: CT neck soft tissue: no soft tissue abnormalities. Of cervical spine. No enlarged lymph nodes. Thyroid normal.   Retropharyngeal space unremarkable. Normal larynx.   Swallowing study: 10/2021: No penetration or aspiration. Speech pathology evaluation showed moderate to severe oropharyngeal dysphagia, lingual weakness, reduced range of motion and coordination, pharyngeal delay, refused hypo laryngeal elevation, reduced epiglottic inversion and pharyngeal squeeze. Recommend softer fine chopped meats thin liquids, no straws. Crushed meds in puree consistency. Aspiration precautions. ENT and neurological evaluation is recommended.     22: Chest x-ray showed coarse reticular opacities in the mid and lower lungs, unchanged from 2021. Calcaneal spurring bilaterally, no erosions. Normal x-ray of bilateral knees. Normal x ray of bilateral hands, no erosions.     ECHO:  Echocardiogram 2021: Concentric left ventricular hypertrophy. Diastolic dysfunction. Mild aortic insufficiency. Moderate pulmonary hypertension. EF 55 to 60%. PASP 41 mmHg.   03/15/2023:  Echocardiogram 2022 showed normal systolic function, EF 60-65%, grade 1 diastolic dysfunction, PASP 34 mmHg.   Echocardiogram 2024 ejection fraction 66%, grade 1 diastolic dysfunction.  PASP 34 mmHg.  No pericardial effusion.    CT CHEST:   3/16/2022 :CT chest showed old granulomatous disease. Predominantly bibasilar interstitial pulmonary fibrosis. Minimal peripheraI honeycombing. Extensive  reticuIonoduIar Iung changes and small calcified lung nodules. Bronchiectasis present. Slightly enlarged mediastinal lymph nodes.   CT chest on 03/15/2023 showed stable pulmonary fibrosis with peripheral honeycombing, bronchiectasis, old granulomatous disease, stable right lateral chest subpleural dense opacity associated with posterior fissures.  Fine reticular lung markings with ground-glass opacity and ground-glass density.  Stable small right upper lobes pleural nodule.    12/15/2023: CT chest showed old granulomatous disease.  Stable predominant basilar interstitial pulmonary fibrosis peripheral honeycombing.  Left upper lobe subpleural reticular markings, extensive left lower lobe reticulonodular changes, extensive right middle and right lower lobe nodular changes.  Course right upper subpleural lung markings.  CT chest 03/12/2024: bibasilar bronchiectatic changes, septal thickening and ground-glass opacities bilaterally.  Nurse small hyperdense foci in the base of lung which may be related to aspirated barium.  No honeycombing, no air trapping  CT chest May 3, 2024 showed stable pulmonary nodules.  Stable bibasilar interstitial fibrosis, peripheral honeycombing, CT chest with UIP pattern.    PFTs:  10/28/2021: PFTs: FVC 71%, FEV1 83%, ratio 91%, total lung capacity 77%, DLCO 22%, DLCO/VA 44%.  2/14/23: PFTs: FVC 75%, FEV1 84%, ratio 86, total lung capacity 78%, DLCO 26%, DLCO/VA 52%.  09/18/2023 PFTs showed FVC 75%, FEV1 89%, ratio 92, total lung capacity 63%, residual volume 56, DLCO 19, DLCO/VA 39.  5/3/24 showed no obstruction mild restriction and moderate diffusion defect that normalizes when corrected for alveolar volume FVC 86%, FEV1 89%, ratio 80%, TLC 70%, RV 65%, DLCO 50%, DL/VA 97%  8/6/24:  FVC 82%, FEV1 86%, ratio 82, total lung capacity 63%, DLCO 48%, DLCO by VA 83%.    Previous rheumatology records from [place]: Dr. Mynor Delarosa: Clinic visit in October 2020: Diagnosed with lupus, pulmonary  fibrosis, pulmonary hypertension. She was treated with methotrexate and Plaquenil in the past. Patient has stable and ongoing shortness of PFTs showed markedly low DLCO. Off of her medications currently. Labs unremarkable. Need to order high resolution. Nonproductive cough. Muscle strength normal. Chest x ray October 2020. Dense reticulonodular fibrosis at the lung bases.       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 Mixed connective tissue disease  M35.1 710.8   3. Drug-induced immunodeficiency  D84.821 279.3    Z79.899 E947.9   4. ILD (interstitial lung disease)  J84.9 515   5. Dysphonia  R49.0 784.42   6. Pulmonary hypertension, unspecified  I27.20 416.8   7. Other dysphagia  R13.19 787.29   8. Postmenopausal  Z78.0 V49.81            Plan:     74-year-old pleasant -American woman presents for follow up of lupus, rheumatoid arthritis, mixed connective tissue disease, interstitial lung disease, vocal cord dysfunction, dysphagia here for follow up of antisynthetase syndrome.     1. Antisynthetase syndrome: Positive myositis antibody, EJ 78, high titer, PL7 is 12, Ml-2 is 17. Positive myositis panel, questionable Raynaud's phenomenon, dysphagia, dysphonia. Intermittent elevation of CPK, had flare in 8/2022 and again most recently 2/2024. Discussed the disease course and treatment options of antisynthetase syndrome. Positive SSA ab associated with ILD and ATS, poor prognosis.  - Started IVIG infusions as she has hoarseness of voice and dysphagia with elevated CPK. Recieved first dose of IVIG 2 g/kg, 130 mg total dose on 9/6/22. Tolerated IVIG well. She had received few more doses and stopped in 12/2022. She does not like to take infusions and will consider in the future if she flares again, refusing to take it currently.  - She had another flare in March, 2024 with elevated CPK and aldolase after tapering prednisone down to 5 mg daily.  Prograf did not tolerated it  due to diarrhea.  Added Imuran 50 mg daily in April 2024.  - Continue with Imuran 50 mg daily.  - C/w CellCept 1500 mg twice daily, 7.5 ml BID.    - Continue with prednisone 7.5 mg po qd- continue this dose, will not taper it, as she had flares with prednisone taper in the past, will keep her at lowest dose possible.  - ECHO stable 3/2024  - unable to take pantoprozole capsules, switched to pantoprazole suspension. Patient denies reflux symptoms but I recommend continuing PPI to dysphagia and ILD.      2. Color changes in hands: Does not have typical color changes of Raynaud's phenomenon, saw pictures in her phone, hands turn dark when its cold. Few dilated capillary nail folds, no dropouts on exam.      3. ILD (interstitial lung disease): Interstitial lung disease: DLCO low, 22% in October 2021. CT chest showed bibasilar reticulonodular changes and mild honeycombing in March 2022. Admits chronic cough, SOB with exertion. She was referred to pulmonary, she had seen Dr Llanes. Continue close follow-up with Dr. Llanes. OFEV was discussed but never started it, due to swallowing issues.   - OFEV was prescribed but she can not tolerate it because of dysphagia, OFEV capsules can not be crushed.  - Will continue to monitor.  - Keep apt with Dr. Llanes.    - complaining of slight worsening of shortness of breath, requested results of recent CT, advised to do PFTs, she wants to do in [place], she will call Dr. Llanes.     4. Dysphonia/dysphagia: Dysphonia is stable. Dysphagia is getting worse. Dysphagia and dysphonia secondary to antisynthetase syndrome. Swallow evaluation showed severe oropharyngeal dysphagia. Recommend ENT and neurological evaluation. She had seen neurologist Dr Francine Goss and ENT Dr Miranda.   - Advised to follow up with ENT.     5. Mixed connective tissue disease: She was treated with lmuran, methotrexate and Plaquenil in the past. Positive ISMAEL, nuclear, cytoplasmic and homogeneous pattern, 1:320.  Positive RNP antibody, 4.6. Positive SSA, 2.7. Positive ISMAEL and SSA antibody likely associated with antisynthetase syndrome. She has features of mixed connective tissue disease and antisynthetase syndrome.   - Check complements and UA periodically.     7. Heart murmur, history of heart issues/Pulmonary HTN: Requested and reviewed records from cardiology Dr Warren. Echocardiogram in September 2021 showed diastolic dysfunction, normal systolic function and elevated PASP 41 mmHg. Continue close follow-up with Cardiology and pulmonary.      8. High risk medication use/Drug Induced Immunodeficency: Persons with rheumatoid arthritis, lupus, psoriatic arthritis and other autoimmune diseases are at increased risk of cardiovascular disease including heart attack and stroke. We recommend that all patients with these conditions have annual health maintenance exams including lipid measurements, blood pressure measurements, and smoking cessation counseling when applicable at their primary care provider's office.   - Advised to stay up-to-date on age appropriate vaccinations and malignancy screening, including yearly skin exams.    - Reviewed uptodate on vaccines, zoster, pneumovax, reviewed. Received RSV vaccine.      9. Bone health/Postmenopausal: Advised to take multivitamin and calcium supplementations. She had DXA done in 8/2021, Dr Rebollar monitoring it.   - She had fractured left 4th metatarsal in winter 2022, when she tripped while getting out of car wearing tight heels with straps.   -3/4/24: Lumbar spine T score: -1.1, Left femoral neck T score:  -1. Right femoral neck T score:  -1.9 .  FRAX score 8.8% for major fracture and 2.1% for hip fracture.  Continue with calcium and vitamin-D supplementations.             Follow up in about 6 months (around 8/19/2025). In addition to their scheduled follow up, the patient has also been instructed to follow up on as needed basis.      Total time spent with patient and  documentation is 40 minutes. All questions were answered to patient's satisfaction and patient verbalized understanding.

## 2025-02-22 LAB — ALDOLASE SERPL-CCNC: 4.5 U/L

## 2025-05-26 ENCOUNTER — OFFICE VISIT (OUTPATIENT)
Dept: RHEUMATOLOGY | Facility: CLINIC | Age: 75
End: 2025-05-26
Payer: MEDICARE

## 2025-05-26 ENCOUNTER — TELEPHONE (OUTPATIENT)
Dept: RHEUMATOLOGY | Facility: CLINIC | Age: 75
End: 2025-05-26
Payer: MEDICARE

## 2025-05-26 ENCOUNTER — LAB VISIT (OUTPATIENT)
Dept: LAB | Facility: HOSPITAL | Age: 75
End: 2025-05-26
Attending: INTERNAL MEDICINE
Payer: MEDICARE

## 2025-05-26 VITALS
OXYGEN SATURATION: 100 % | WEIGHT: 157.38 LBS | HEIGHT: 65 IN | DIASTOLIC BLOOD PRESSURE: 72 MMHG | SYSTOLIC BLOOD PRESSURE: 142 MMHG | BODY MASS INDEX: 26.22 KG/M2 | HEART RATE: 60 BPM | TEMPERATURE: 98 F | RESPIRATION RATE: 18 BRPM

## 2025-05-26 DIAGNOSIS — M35.1 MIXED CONNECTIVE TISSUE DISEASE: ICD-10-CM

## 2025-05-26 DIAGNOSIS — D89.89 ANTISYNTHETASE SYNDROME: Primary | ICD-10-CM

## 2025-05-26 DIAGNOSIS — R49.0 DYSPHONIA: ICD-10-CM

## 2025-05-26 DIAGNOSIS — D84.821 DRUG-INDUCED IMMUNODEFICIENCY: ICD-10-CM

## 2025-05-26 DIAGNOSIS — D89.89 ANTISYNTHETASE SYNDROME: ICD-10-CM

## 2025-05-26 DIAGNOSIS — Z79.899 DRUG-INDUCED IMMUNODEFICIENCY: ICD-10-CM

## 2025-05-26 DIAGNOSIS — R13.19 OTHER DYSPHAGIA: ICD-10-CM

## 2025-05-26 DIAGNOSIS — I27.20 PULMONARY HYPERTENSION, UNSPECIFIED: ICD-10-CM

## 2025-05-26 DIAGNOSIS — J84.9 ILD (INTERSTITIAL LUNG DISEASE): ICD-10-CM

## 2025-05-26 LAB
ALBUMIN SERPL-MCNC: 3.9 G/DL (ref 3.4–4.8)
ALBUMIN/GLOB SERPL: 0.9 RATIO (ref 1.1–2)
ALP SERPL-CCNC: 79 UNIT/L (ref 40–150)
ALT SERPL-CCNC: 9 UNIT/L (ref 0–55)
ANION GAP SERPL CALC-SCNC: 7 MEQ/L
AST SERPL-CCNC: 17 UNIT/L (ref 11–45)
BACTERIA #/AREA URNS AUTO: ABNORMAL /HPF
BASOPHILS # BLD AUTO: 0.05 X10(3)/MCL
BASOPHILS NFR BLD AUTO: 0.5 %
BILIRUB SERPL-MCNC: 0.4 MG/DL
BILIRUB UR QL STRIP.AUTO: NEGATIVE
BUN SERPL-MCNC: 7.5 MG/DL (ref 9.8–20.1)
CALCIUM SERPL-MCNC: 9.7 MG/DL (ref 8.4–10.2)
CHLORIDE SERPL-SCNC: 105 MMOL/L (ref 98–107)
CK SERPL-CCNC: 104 U/L (ref 29–168)
CLARITY UR: CLEAR
CO2 SERPL-SCNC: 27 MMOL/L (ref 23–31)
COLOR UR AUTO: COLORLESS
CREAT SERPL-MCNC: 0.76 MG/DL (ref 0.55–1.02)
CREAT UR-MCNC: 56.8 MG/DL (ref 45–106)
CREAT/UREA NIT SERPL: 10
CRP SERPL-MCNC: 2.1 MG/L
EOSINOPHIL # BLD AUTO: 0.12 X10(3)/MCL (ref 0–0.9)
EOSINOPHIL NFR BLD AUTO: 1.2 %
ERYTHROCYTE [DISTWIDTH] IN BLOOD BY AUTOMATED COUNT: 14.9 % (ref 11.5–17)
ERYTHROCYTE [SEDIMENTATION RATE] IN BLOOD: 31 MM/HR (ref 0–20)
GFR SERPLBLD CREATININE-BSD FMLA CKD-EPI: >60 ML/MIN/1.73/M2
GLOBULIN SER-MCNC: 4.3 GM/DL (ref 2.4–3.5)
GLUCOSE SERPL-MCNC: 106 MG/DL (ref 82–115)
GLUCOSE UR QL STRIP: NORMAL
HCT VFR BLD AUTO: 40.5 % (ref 37–47)
HGB BLD-MCNC: 11.9 G/DL (ref 12–16)
HGB UR QL STRIP: NEGATIVE
HYALINE CASTS #/AREA URNS LPF: ABNORMAL /LPF
IMM GRANULOCYTES # BLD AUTO: 0.05 X10(3)/MCL (ref 0–0.04)
IMM GRANULOCYTES NFR BLD AUTO: 0.5 %
KETONES UR QL STRIP: NEGATIVE
LEUKOCYTE ESTERASE UR QL STRIP: NEGATIVE
LYMPHOCYTES # BLD AUTO: 1.72 X10(3)/MCL (ref 0.6–4.6)
LYMPHOCYTES NFR BLD AUTO: 17.5 %
MCH RBC QN AUTO: 25.2 PG (ref 27–31)
MCHC RBC AUTO-ENTMCNC: 29.4 G/DL (ref 33–36)
MCV RBC AUTO: 85.8 FL (ref 80–94)
MONOCYTES # BLD AUTO: 0.5 X10(3)/MCL (ref 0.1–1.3)
MONOCYTES NFR BLD AUTO: 5.1 %
MUCOUS THREADS URNS QL MICRO: ABNORMAL /LPF
NEUTROPHILS # BLD AUTO: 7.4 X10(3)/MCL (ref 2.1–9.2)
NEUTROPHILS NFR BLD AUTO: 75.2 %
NITRITE UR QL STRIP: NEGATIVE
NRBC BLD AUTO-RTO: 0 %
PH UR STRIP: 6 [PH]
PLATELET # BLD AUTO: 282 X10(3)/MCL (ref 130–400)
PMV BLD AUTO: 10.8 FL (ref 7.4–10.4)
POTASSIUM SERPL-SCNC: 4.1 MMOL/L (ref 3.5–5.1)
PROT SERPL-MCNC: 8.2 GM/DL (ref 5.8–7.6)
PROT UR QL STRIP: NEGATIVE
PROT UR STRIP-MCNC: <6.8 MG/DL
RBC # BLD AUTO: 4.72 X10(6)/MCL (ref 4.2–5.4)
RBC #/AREA URNS AUTO: ABNORMAL /HPF
SODIUM SERPL-SCNC: 139 MMOL/L (ref 136–145)
SP GR UR STRIP.AUTO: 1.01 (ref 1–1.03)
SQUAMOUS #/AREA URNS LPF: ABNORMAL /HPF
UROBILINOGEN UR STRIP-ACNC: NORMAL
WBC # BLD AUTO: 9.84 X10(3)/MCL (ref 4.5–11.5)
WBC #/AREA URNS AUTO: ABNORMAL /HPF

## 2025-05-26 PROCEDURE — 86140 C-REACTIVE PROTEIN: CPT

## 2025-05-26 PROCEDURE — 36415 COLL VENOUS BLD VENIPUNCTURE: CPT

## 2025-05-26 PROCEDURE — 82085 ASSAY OF ALDOLASE: CPT

## 2025-05-26 PROCEDURE — 81001 URINALYSIS AUTO W/SCOPE: CPT

## 2025-05-26 PROCEDURE — 86160 COMPLEMENT ANTIGEN: CPT

## 2025-05-26 PROCEDURE — 99215 OFFICE O/P EST HI 40 MIN: CPT | Mod: S$PBB,,, | Performed by: INTERNAL MEDICINE

## 2025-05-26 PROCEDURE — G2211 COMPLEX E/M VISIT ADD ON: HCPCS | Mod: S$PBB,,, | Performed by: INTERNAL MEDICINE

## 2025-05-26 PROCEDURE — 86160 COMPLEMENT ANTIGEN: CPT | Mod: 59

## 2025-05-26 PROCEDURE — 82550 ASSAY OF CK (CPK): CPT

## 2025-05-26 PROCEDURE — 85025 COMPLETE CBC W/AUTO DIFF WBC: CPT

## 2025-05-26 PROCEDURE — 85652 RBC SED RATE AUTOMATED: CPT

## 2025-05-26 PROCEDURE — 82570 ASSAY OF URINE CREATININE: CPT

## 2025-05-26 PROCEDURE — 99214 OFFICE O/P EST MOD 30 MIN: CPT | Mod: PBBFAC | Performed by: INTERNAL MEDICINE

## 2025-05-26 PROCEDURE — 80053 COMPREHEN METABOLIC PANEL: CPT

## 2025-05-26 RX ORDER — MYCOPHENOLATE MOFETIL 200 MG/ML
1500 POWDER, FOR SUSPENSION ORAL 2 TIMES DAILY
Qty: 450 ML | Refills: 3 | Status: SHIPPED | OUTPATIENT
Start: 2025-05-26

## 2025-05-26 RX ORDER — PANTOPRAZOLE SODIUM 40 MG/1
40 FOR SUSPENSION ORAL DAILY
Qty: 90 PACKET | Refills: 6 | Status: SHIPPED | OUTPATIENT
Start: 2025-05-26

## 2025-05-26 RX ORDER — PREDNISONE 5 MG/1
5 TABLET ORAL DAILY
Qty: 30 TABLET | Refills: 3 | Status: SHIPPED | OUTPATIENT
Start: 2025-05-26

## 2025-05-26 RX ORDER — AMOXICILLIN 500 MG/1
CAPSULE ORAL
COMMUNITY
Start: 2025-02-18 | End: 2025-05-26 | Stop reason: ALTCHOICE

## 2025-05-26 RX ORDER — ACETAMINOPHEN 500 MG
2000 TABLET ORAL DAILY
COMMUNITY

## 2025-05-26 RX ORDER — CHOLECALCIFEROL (VITAMIN D3) 25 MCG
1 TABLET,CHEWABLE ORAL DAILY
COMMUNITY

## 2025-05-26 RX ORDER — PREDNISONE 2.5 MG/1
TABLET ORAL
Qty: 30 TABLET | Refills: 3 | Status: SHIPPED | OUTPATIENT
Start: 2025-05-26

## 2025-05-26 RX ORDER — PROPRANOLOL HYDROCHLORIDE 60 MG/1
60 TABLET ORAL DAILY
COMMUNITY

## 2025-05-26 RX ORDER — AZATHIOPRINE 50 MG/1
50 TABLET ORAL DAILY
Qty: 30 TABLET | Refills: 3 | Status: SHIPPED | OUTPATIENT
Start: 2025-05-26 | End: 2026-05-26

## 2025-05-26 RX ORDER — CYCLOSPORINE 0.5 MG/ML
1 EMULSION OPHTHALMIC 2 TIMES DAILY
COMMUNITY
Start: 2025-03-05

## 2025-05-26 RX ORDER — MAGNESIUM 250 MG
1 TABLET ORAL ONCE
COMMUNITY

## 2025-05-26 NOTE — PROGRESS NOTES
Patient ID: [id number]     Chief Complaint: Follow-up (Patient is here for a follow up Antisynthetase syndrome/. She denies any issues today. )      HPI:     Devi Fernandes is a 74 y.o. 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       She was diagnosed with Lupus in 2008. She had seen Rheumatologist in [place] in the past, does not remember the name. She was treated with prednisone. She was treated with some medications back then, she does not remember the name on medications. She may have been on lmuran, methotrexate and hydroxychloroquine in the past. Used higher dose of prednisone in the past. She was on prednisone daily in the past.     She was admitted at [facility] in April 2023, shows admitted for Rhabdomyolysis due to COVID, she was treated with steroids. Reviewed discharge summary.     She is here for follow-up today.  Shortness of breath is stable.  Denies productive cough.  She has dysphagia, cannot take tablets, she is taking liquid CellCept, 1500 mg twice daily. Tolerating it well.  She had flare with elevated CPK and aldolase in March, 2024, added Prograf but she has severe diarrhea with it.  Discontinued Prograf and started her on Imuran 50 mg daily in April, 2024.  Tolerating Imuran well without any issues.  Currently prednisone dose is back down to her baseline dose 7.5 mg daily.  She had flare of anti synthetase syndrome in 8/2022, added IVIG. She took IVIG for few months and stopped in November 2022, she does not like to get those infusions, she does not find them helpful(even though flare was improved with it, CPK normalized) she does not like to continue it and said she will consider it if she flares again.   Dr Llanes had discussed OFEV, she has not started it yet because of dysphagia, she can not swallow capsules. OFEV can not be crushed.   Had seen Neurology and ENT, Dr Miranda for dysphagia and dysphonia.   She had seen Dr. Llanes in jan 2025, she had CT  "chest done on 1/6/25, requested and reviewed records.  Morning stiffness for 5 min. Denies red, warm and swollen joints.   She follows with Neurology Dr. Goss, switched metoprolol to propranolol for tremors.  Admits swallowing issues since 2017. She has to swallow small pieces. Swallowing issues getting worse. She chokes on solids and liquids. Food does not get stuck in chest intermittently. Symptoms are stable.   Admits color changes in hands for the last 15 years, since 2007. Cold is trigger. She will have when its cold, could be twice month or every day if its cold. Episode lasts for 15 min. Fingers never turn white, always purple in tips.   She had surgery for vocal cord polyps in 2017 and since then she has dysphonia. Voice issues are same since 2017, not getting worse. She follows with Dr Miranda.   Follows with neurology clinic [facility] Dr. Escamilla: "EMG showed no spontaneous activity to suggest LMN involvement. MG panel was negative. Imaging shows no findings to explain brisk reflexes. I do see early recruitement and myopathic MUAPs on EMG today, suspect she does have mild muscle involvement (mild neck flexion weakness and mild delt weakness bilaterally). 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 Continue close f/u with rheumatology."    Denies any recent illness or hospitalizations since last visit.     Denies fevers, oral or nasal ulcers, rashes, photosensitivity, history of DVT or PE, history of stroke or seizures, history of Ml, history of inflammatory eye diseases, history of malignancy.   Autoimmune diseases in family: none.   Miscarriages: 2, first trimester miscarriages. Pregnancies: 3   Smoking: nonsmoker.     Social History     Tobacco Use   Smoking Status Never   Smokeless Tobacco Never          -------------------------------------    Allergy    Aortic regurgitation    Arthritis    BMI 22.0-22.9, adult    Deviated nasal septum    Diabetes mellitus    Dysphagia    GERD " (gastroesophageal reflux disease)    Hypertension    PAOLA (obstructive sleep apnea)    Pulmonary fibrosis    Pulmonary hypertension    Pulmonic regurgitation    Restrictive lung disease    Sinus bradycardia    Valvular heart disease        Past Surgical History:   Procedure Laterality Date    APPENDECTOMY      CARDIAC CATHETERIZATION  2017     SECTION      COLONOSCOPY  2011    ESOPHAGEAL MANOMETRY  2018    EYE SURGERY  2021    Cataract surgery    GASTROSCOPY  02/15/2018    HYSTERECTOMY  2003    Vocal Cord Surgery  2017    cyst removed       Review of patient's allergies indicates:   Allergen Reactions    Codeine Other (See Comments)     Becomes weak and over heated.   Other reaction(s): unknown  Hot flashes       Outpatient Medications Marked as Taking for the 25 encounter (Office Visit) with Rad Manzanares MD   Medication Sig Dispense Refill    amLODIPine (NORVASC) 5 MG tablet Take 1 tablet by mouth once daily.      biotin 5,000 mcg Chew Take 1 capsule by mouth Daily. Pt stated doesn't take it regularly      cholecalciferol, vitamin D3, (VITAMIN D3) 50 mcg (2,000 unit) Cap capsule 1 capsule.      cholecalciferol, vitamin D3, (VITAMIN D3) 50 mcg (2,000 unit) Cap capsule Take 2,000 Units by mouth once daily. Gummy vitamin      cyanocobalamin, vitamin B-12, 3,000 mcg Cap Take 1 tablet by mouth once daily. Gummy      folic acid (FOLVITE) 1 MG tablet Take 1 tablet (1 mg total) by mouth once daily. 90 tablet 1    magnesium 250 mg Tab Take 1 tablet by mouth once. Gummy vitamin      propranoloL (INDERAL) 60 MG tablet Take 60 mg by mouth once daily.      propylene glycol (SYSTANE BALANCE OPHT) Apply 1 drop to eye daily as needed.      RESTASIS 0.05 % ophthalmic emulsion Place 1 drop into both eyes 2 (two) times daily.      triamcinolone (NASACORT) 55 mcg nasal inhaler 2 sprays by Nasal route once daily.      vit A/vit D3/vit E/vit K1/zinc (ADEK GUMMIES PLUS ZINC ORAL) Take by mouth.       ZINC ACETATE ORAL Take 1 tablet by mouth once daily. Gummy      [DISCONTINUED] amoxicillin (AMOXIL) 500 MG capsule SMARTSI Capsule(s) By Mouth      [DISCONTINUED] azaTHIOprine (IMURAN) 50 mg Tab Take 1 tablet (50 mg total) by mouth once daily. Hold for fever or infections. 30 tablet 3    [DISCONTINUED] mycophenolate mofetil (CELLCEPT) 200 mg/mL SusR Take 7.5 mLs (1,500 mg total) by mouth 2 (two) times daily. 450 mL 3    [DISCONTINUED] pantoprazole (PROTONIX) 40 mg suspension Take 1 packet (40 mg total) by mouth once daily. 90 packet 6    [DISCONTINUED] predniSONE (DELTASONE) 2.5 MG tablet Take 1 tab po qd with 5 mg tab for a total of 7.5 mg po qd 30 tablet 3       Social History     Socioeconomic History    Marital status:    Tobacco Use    Smoking status: Never    Smokeless tobacco: Never   Substance and Sexual Activity    Alcohol use: No    Drug use: No    Sexual activity: Not Currently     Partners: Female     Birth control/protection: Abstinence     Social Drivers of Health     Financial Resource Strain: Low Risk  (2023)    Received from [facility]    Overall Financial Resource Strain (CARDIA)     Difficulty of Paying Living Expenses: Not hard at all   Food Insecurity: No Food Insecurity (2025)    Received from [facility]    Hunger Vital Sign     Worried About Running Out of Food in the Last Year: Never true     Ran Out of Food in the Last Year: Never true   Transportation Needs: No Transportation Needs (2023)    Received from [facility]    PRAPARE - Transportation     Lack of Transportation (Medical): No     Lack of Transportation (Non-Medical): No        Family History   Problem Relation Name Age of Onset    Cancer Mother Mrs. Sade Fernandes     Diabetes Mellitus Father Lanre Fernandes, Sr.     Diabetes Father Lanre Fernandes, Sr.     Vision loss Father Lanre Fernandes, Sr.     Cancer Sister      Breast cancer Sister  66        Left  breast        Immunization History   Administered Date(s) Administered    COVID-19, MRNA, LN-S, PF (MODERNA FULL 0.5 ML DOSE) 10/30/2021, 04/12/2022, 08/06/2022    COVID-19, mRNA, LNP-S, PF (Moderna) Ages 12+ 11/06/2023, 09/26/2024    Influenza (FLUAD) - Quadrivalent - Adjuvanted - PF *Preferred* (65+) 11/03/2022, 10/06/2023    Influenza - Trivalent - Afluria, Fluzone MDV 09/30/2020    Influenza - Trivalent - Fluad - Adjuvanted - PF (65 years and older 10/10/2018    Influenza - Trivalent - Fluzone High Dose - PF (65 years and older) 12/04/2017, 09/17/2019, 09/30/2020, 10/21/2021, 09/26/2024    Pneumococcal Conjugate - 13 Valent 02/06/2019    Pneumococcal Polysaccharide - 23 Valent 11/03/2022    RSVpreF (Arexvy) 11/06/2023    Td (Adult), Unspecified Formulation 10/21/2020    Tdap 02/06/2019    Zoster Recombinant 11/03/2022, 05/09/2023       Patient Care Team:  Ariana Grewal MD as PCP - General (Internal Medicine)  Mindy Aguilar MD as Consulting Physician (Gastroenterology)     Subjective:       Constitutional:  Denies chills. Denies fever. Denies night sweats. Denies weight loss.   Ophthalmology: Denies blurred vision. Denies dry eyes. Denies eye pain. Denies Itching and redness.   ENT: Denies oral ulcers. Denies epistaxis. Denies dry mouth. Denies swollen glands.   Endocrine: Denies diabetes. Denies thyroid Problems.   Respiratory: Denies cough. Denies shortness of breath. Admits shortness of breath with exertion. Denies hemoptysis.   Cardiovascular: Denies chest pain at rest. Denies chest pain with exertion. Denies palpitations- has resolved with med increase by Cardiology  Gastrointestinal: Denies abdominal pain. Denies nausea. Denies vomiting. Denies hematemesis or hematochezia. Denies heartburn.  Genitourinary: Denies blood in urine.  Musculoskeletal: See HPI for details  Integumentary: Denies rash. Denies photosensitivity.   Peripheral Vascular: Denies Ulcers of hands and/or feet. Denies Cold  "extremities.   Neurologic: Denies dizziness. Denies headache.  Denies loss of strength. Denies numbness or tingling.   Psychiatric: Denies depression. Denies anxiety. Denies suicidal/homicidal ideations.      Objective:     BP (!) 142/72 (BP Location: Left arm, Patient Position: Sitting)   Pulse 60   Temp 98 °F (36.7 °C) (Oral)   Resp 18   Ht 5' 5" (1.651 m)   Wt 71.4 kg (157 lb 6.4 oz)   SpO2 100%   BMI 26.19 kg/m²     Physical Exam    General Appearance: alert, pleasant, in no acute distress.  Skin: Skin color, texture, turgor normal. No rashes or lesions. Few capillary dilatations, no drop outs.   Eyes:  extraocular movement intact (EOMI), pupils equal, round, reactive to light and accommodation, conjunctiva clear.  ENT: No oral or nasal ulcers.  Neck:  Neck supple. No adenopathy.   Lungs: fine crackles in bases, clear through out.   Heart: RRR.  No edema. Soft systolic murmur present.   Neuro: Alert, oriented, CN II-XII GI, sensory and motor innervation intact.  Psych: Alert, oriented, normal eye contact.    Joint Exam:  DIPs, PIPs, MCPs: no pain on palpation, no swelling or redness, no nodules. DJD of bilateral hands.   Wrists: no pain on palpation, no swelling or redness, good range of motion.  Elbows: no pain on palpation, no swelling or redness, good range of motion, no nodules.  Feet: no pain on palpation, no swelling or redness, no nodules.  Back: right lower paraspinal muscle tenderness    Labs:   August 2021: CMP okay. CBC okay. SPEP showed polyclonal gammopathy and elevated beta proteins. 2/18/2022 :ESR, CRP normal. CBC okay. CMP okay. Globulin and protein slightly high. CPK normal.  mg/g. Rheumatoid factor and anti CCP negative. Positive RNP antibody, 4.6. Positive SSA, 2.7. Negative SCL 70, SSB, Smith, dsDNA, thyroglobulin, TPO, RNA polymerase 3, centromere antibodies. Aldolase normal. ANCA, MPO and [place]-3 negative. Cardiolipin, beta-2 glycoprotein negative. LAC negative. Positive ISMAEL, " nuclear, cytoplasmic and homogeneous pattern, 1:320. Positive myositis antibody, EJ 78, high titer, PL? is 12, Ml-2 is 17. C3, C4 normal. No blood and protein in urine.   3/24/22: Hep B, C, HIV, quantiferon tb negative.   5/3/2022 RDW elevated 15.0. CBC okay. Glucose 129. CMP okay. CPK and aldolase normal. ESR 24, CRP normal. TPMT low, low metaboilizer.   6/10/2022: CMP okay. LFTs normal. CPK and aldoalse normal. Hemoglobin 11.8. ESR 45, normal less than 30. CRP normal. No blood and protein in urine. C3, C4 okay.  mg/g.   8/17/2022- during hospitalization: AST elevated 72, otherwise CMP okay. CPK greater than 2000. Elevated WBC count, otherwise CBC okay. Small protein and no blood in urine.  8/25/2022: C3, C4 okay. Urine protein less than 15 mg/dL. No blood and protein in urine. Small leukocyte esterase. WBC count elevated secondary to steroids. WBC count 13.4, hemoglobin 11.5. Bicarb slightly elevated. CMP okay otherwise. CPK elevated 2076. ESR 40, CRP normal. Aldolase 28.   1/18/23: CMP ok. ESR 55, CRP normal. Hg 11.7. C3, C4 normal. CPK and aldolase normal.  3/27/23:  UA, upc okay.  CMP okay.  ESR 32.  WBC count 11.8.  CBC okay otherwise.  C3, C4 okay.  CRP normal.  Aldolase and CPK normal.  5/2/23:  WBC count elevated 15.3.  Hemoglobin 11.0.  AST 49, ALC 76, upper limits of normal.  Calcium 8.3.  GFR normal.  CPK elevated 1096.  BNP normal.  No protein and blood in urine.  Labs done by her PCP, Dr. Rebollar.  07/25/2023; trace protein and no blood in urine.  Upc okay.  CMP okay.  ESR 22.  Hemoglobin 11.0.  CBC okay otherwise.  C3, C4 normal.  CPK and aldolase normal.  CRP normal.  11/7/23:  CPK normal.  UPC okay.  Trace protein and no blood in urine urine.  CRP 14.6.  CMP okay.  ESR 33.  Hemoglobin 11.9.  Aldolase normal.  C3, C4 okay  2/28/24: Hg 11.1.  AST 42.  CPK 7,946. Aldolase 44.  4/4/24:  CMP okay.  Hemoglobin 11.9.  CPK normal. Aldolase normal  4/30/2024 CRP 7.40 (<5), CMP ok. Sed Rate 55 (<20),  CBC WBC 16.66 ANC 13.21, otherwise ok. CPK 68 ok, UPC ok. Urine no blood or protein noted. C3/C4 ok. Aldolase 6.1 ok, TPMT normal metabolizer.  2024 CMP ok, CBC hgb 10.8, hct 36.5, otherwise ok.   24:  No protein and blood in urine.  Upc mg per g.  ESR 33.  CMP okay.  Hemoglobin 11.4.  CRP normal.  CPK and aldolase normal.  C3, C4 normal.  2024; CMP okay.  CBC okay.  CPK and aldolase normal.  25: CMP ok. CBC ok. CPK and aldolase normal.     Imagin2021: CT neck soft tissue: no soft tissue abnormalities. Of cervical spine. No enlarged lymph nodes. Thyroid normal.   Retropharyngeal space unremarkable. Normal larynx.   Swallowing study: 10/2021: No penetration or aspiration. Speech pathology evaluation showed moderate to severe oropharyngeal dysphagia, lingual weakness, reduced range of motion and coordination, pharyngeal delay, refused hypo laryngeal elevation, reduced epiglottic inversion and pharyngeal squeeze. Recommend softer fine chopped meats thin liquids, no straws. Crushed meds in puree consistency. Aspiration precautions. ENT and neurological evaluation is recommended.     22: Chest x-ray showed coarse reticular opacities in the mid and lower lungs, unchanged from 2021. Calcaneal spurring bilaterally, no erosions. Normal x-ray of bilateral knees. Normal x ray of bilateral hands, no erosions.     ECHO:  Echocardiogram 2021: Concentric left ventricular hypertrophy. Diastolic dysfunction. Mild aortic insufficiency. Moderate pulmonary hypertension. EF 55 to 60%. PASP 41 mmHg.   03/15/2023:  Echocardiogram 2022 showed normal systolic function, EF 60-65%, grade 1 diastolic dysfunction, PASP 34 mmHg.   Echocardiogram 2024 ejection fraction 66%, grade 1 diastolic dysfunction.  PASP 34 mmHg.  No pericardial effusion.    CT CHEST:   3/16/2022 :CT chest showed old granulomatous disease. Predominantly bibasilar interstitial pulmonary fibrosis. Minimal peripheraI  honeycombing. Extensive reticuIonoduIar Iung changes and small calcified lung nodules. Bronchiectasis present. Slightly enlarged mediastinal lymph nodes.   CT chest on 03/15/2023 showed stable pulmonary fibrosis with peripheral honeycombing, bronchiectasis, old granulomatous disease, stable right lateral chest subpleural dense opacity associated with posterior fissures.  Fine reticular lung markings with ground-glass opacity and ground-glass density.  Stable small right upper lobes pleural nodule.    12/15/2023: CT chest showed old granulomatous disease.  Stable predominant basilar interstitial pulmonary fibrosis peripheral honeycombing.  Left upper lobe subpleural reticular markings, extensive left lower lobe reticulonodular changes, extensive right middle and right lower lobe nodular changes.  Course right upper subpleural lung markings.  CT chest 03/12/2024: bibasilar bronchiectatic changes, septal thickening and ground-glass opacities bilaterally.  Nurse small hyperdense foci in the base of lung which may be related to aspirated barium.  No honeycombing, no air trapping  CT chest May 3, 2024 showed stable pulmonary nodules.  Stable bibasilar interstitial fibrosis, peripheral honeycombing, CT chest with UIP pattern.  CT chest 01/06/2025:  Bibasilar predominant traction bronchiectasis, honeycombing and ground-glass opacities not significantly changed compared to March, 2022.  No significant lymphadenopathy.    PFTs:  10/28/2021: PFTs: FVC 71%, FEV1 83%, ratio 91%, total lung capacity 77%, DLCO 22%, DLCO/VA 44%.  2/14/23: PFTs: FVC 75%, FEV1 84%, ratio 86, total lung capacity 78%, DLCO 26%, DLCO/VA 52%.  09/18/2023 PFTs showed FVC 75%, FEV1 89%, ratio 92, total lung capacity 63%, residual volume 56, DLCO 19, DLCO/VA 39.  5/3/24 showed no obstruction mild restriction and moderate diffusion defect that normalizes when corrected for alveolar volume FVC 86%, FEV1 89%, ratio 80%, TLC 70%, RV 65%, DLCO 50%, DL/VA  97%  8/6/24:  FVC 82%, FEV1 86%, ratio 82, total lung capacity 63%, DLCO 48%, DLCO by VA 83%.    Previous rheumatology records from [place]: Dr. Mynor Delarosa: Clinic visit in October 2020: Diagnosed with lupus, pulmonary fibrosis, pulmonary hypertension. She was treated with methotrexate and Plaquenil in the past. Patient has stable and ongoing shortness of PFTs showed markedly low DLCO. Off of her medications currently. Labs unremarkable. Need to order high resolution. Nonproductive cough. Muscle strength normal. Chest x ray October 2020. Dense reticulonodular fibrosis at the lung bases.       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 Mixed connective tissue disease  M35.1 710.8   3. Drug-induced immunodeficiency  D84.821 279.3    Z79.899 E947.9   4. ILD (interstitial lung disease)  J84.9 515   5. Dysphonia  R49.0 784.42   6. Pulmonary hypertension, unspecified  I27.20 416.8   7. Other dysphagia  R13.19 787.29              Plan:     74-year-old pleasant -American woman presents for follow up of lupus, rheumatoid arthritis, mixed connective tissue disease, interstitial lung disease, vocal cord dysfunction, dysphagia here for follow up of antisynthetase syndrome.     1. Antisynthetase syndrome: Positive myositis antibody, EJ 78, high titer, PL7 is 12, Ml-2 is 17. Positive myositis panel, questionable Raynaud's phenomenon, dysphagia, dysphonia. Intermittent elevation of CPK, had flare in 8/2022 and again most recently 2/2024. Discussed the disease course and treatment options of antisynthetase syndrome. Positive SSA ab associated with ILD and ATS, poor prognosis.  - Started IVIG infusions as she has hoarseness of voice and dysphagia with elevated CPK. Recieved first dose of IVIG 2 g/kg, 130 mg total dose on 9/6/22. Tolerated IVIG well. She had received few more doses and stopped in 12/2022. She does not like to take infusions and will consider in the future if she  flares again, refusing to take it currently.  - She had another flare in March, 2024 with elevated CPK and aldolase after tapering prednisone down to 5 mg daily.  Prograf did not tolerated it due to diarrhea.  Added Imuran 50 mg daily in April 2024.  - Continue with Imuran 50 mg daily.  - C/w CellCept 1500 mg twice daily, 7.5 ml BID.    - Continue with prednisone 7.5 mg po qd- continue this dose, will not taper it, as she had flares with prednisone taper in the past, will keep her at lowest dose possible.  - ECHO stable 3/2024  - unable to take pantoprozole capsules, switched to pantoprazole suspension. Patient denies reflux symptoms but I recommend continuing PPI to dysphagia and ILD.   - labs today.     2. Color changes in hands: Does not have typical color changes of Raynaud's phenomenon, saw pictures in her phone, hands turn dark when its cold. Few dilated capillary nail folds, no dropouts on exam.      3. ILD (interstitial lung disease): Interstitial lung disease: DLCO low, 22% in October 2021. CT chest showed bibasilar reticulonodular changes and mild honeycombing in March 2022. Admits chronic cough, SOB with exertion. She was referred to pulmonary, she had seen Dr Llanes. Continue close follow-up with Dr. Llanes. OFEV was discussed but never started it, due to swallowing issues.   - OFEV was prescribed but she can not tolerate it because of dysphagia, OFEV capsules can not be crushed.  - Will continue to monitor.  - Keep apt with Dr. Llanes.    - CT chest stable in January 2025.  Requested results of recent PFTs from Dr. Llanes.     4. Dysphonia/dysphagia: Dysphonia is stable. Dysphagia is getting worse. Dysphagia and dysphonia secondary to antisynthetase syndrome. Swallow evaluation showed severe oropharyngeal dysphagia. Recommend ENT and neurological evaluation. She had seen neurologist Dr Francine Goss and ENT Dr Miranda.   - Advised to follow up with ENT.     5. Mixed connective tissue disease: She was treated  with lmuran, methotrexate and Plaquenil in the past. Positive ISMAEL, nuclear, cytoplasmic and homogeneous pattern, 1:320. Positive RNP antibody, 4.6. Positive SSA, 2.7. Positive ISMAEL and SSA antibody likely associated with antisynthetase syndrome. She has features of mixed connective tissue disease and antisynthetase syndrome.   - I will check complements and UA periodically.     7. Heart murmur, history of heart issues/Pulmonary HTN: Requested and reviewed records from cardiology Dr Warren. Echocardiogram in September 2021 showed diastolic dysfunction, normal systolic function and elevated PASP 41 mmHg. Continue close follow-up with Cardiology and pulmonary.   - Echocardiogram 03/06/2024 ejection fraction 66%, grade 1 diastolic dysfunction.  PASP 34 mmHg.  No pericardial effusion. Stable.      8. High risk medication use/Drug Induced Immunodeficency: Persons with rheumatoid arthritis, lupus, psoriatic arthritis and other autoimmune diseases are at increased risk of cardiovascular disease including heart attack and stroke. We recommend that all patients with these conditions have annual health maintenance exams including lipid measurements, blood pressure measurements, and smoking cessation counseling when applicable at their primary care provider's office.   - Advised to stay up-to-date on age appropriate vaccinations and malignancy screening, including yearly skin exams.    - Reviewed uptodate on vaccines, zoster, pneumovax, reviewed. Received RSV vaccine.      9. Bone health/Postmenopausal: Advised to take multivitamin and calcium supplementations. She had DXA done in 8/2021, Dr Rebollar monitoring it.   - She had fractured left 4th metatarsal in winter 2022, when she tripped while getting out of car wearing tight heels with straps.   -3/4/24: Lumbar spine T score: -1.1, Left femoral neck T score:  -1. Right femoral neck T score:  -1.9 .  FRAX score 8.8% for major fracture and 2.1% for hip fracture.  Continue with  calcium and vitamin-D supplementations.         Follow up in about 6 months (around 11/26/2025). In addition to their scheduled follow up, the patient has also been instructed to follow up on as needed basis.      Total time spent with patient and documentation is 45 minutes. All questions were answered to patient's satisfaction and patient verbalized understanding.           Answers submitted by the patient for this visit:  Rheumatology Questionnaire (Submitted on 5/24/2025)  fever: No  eye redness: Yes  mouth sores: No  headaches: No  shortness of breath: No  chest pain: No  trouble swallowing: Yes  diarrhea: No  constipation: No  unexpected weight change: No  genital sore: Yes  dysuria: No  During the last 3 days, have you had a skin rash?: No  Bruises or bleeds easily: No  cough: No

## 2025-05-26 NOTE — TELEPHONE ENCOUNTER
Records requested    PHYSICAL EXAM:    Vital Signs Last 24 Hrs  T(C): 36.7 (29 May 2022 06:57), Max: 36.8 (28 May 2022 18:36)  T(F): 98.1 (29 May 2022 06:57), Max: 98.2 (28 May 2022 18:36)  HR: 60 (29 May 2022 06:57) (60 - 81)  BP: 125/68 (29 May 2022 06:57) (125/67 - 125/68)  BP(mean): --  RR: 18 (29 May 2022 06:57) (16 - 18)  SpO2: 96% (29 May 2022 06:57) (96% - 99%)    GENERAL: Pt lying in bed in some distress due to back pain  HEENT:  Atraumatic, EOMI, PERRL, conjunctiva and sclera clear, dry MM  NECK: Supple  CHEST/LUNG: Clear to auscultation bilaterally  HEART: Regular rate and rhythm  ABDOMEN: Bowel sounds present; Soft, Nontender, Nondistended.  EXTREMITIES:  2+ Peripheral Pulses, brisk capillary refill. No edema  NEUROLOGICAL:  Alert & Oriented X3, +L straight leg raise   MSK: no overt deformity  SKIN: warm, dry

## 2025-05-27 LAB
C3 SERPL-MCNC: 151 MG/DL (ref 80–173)
C4 SERPL-MCNC: 32 MG/DL (ref 13–46)

## 2025-05-29 LAB — ALDOLASE SERPL-CCNC: 4.6 U/L

## 2025-08-25 ENCOUNTER — LAB VISIT (OUTPATIENT)
Dept: LAB | Facility: HOSPITAL | Age: 75
End: 2025-08-25
Attending: INTERNAL MEDICINE
Payer: MEDICARE

## 2025-08-25 ENCOUNTER — OFFICE VISIT (OUTPATIENT)
Dept: RHEUMATOLOGY | Facility: CLINIC | Age: 75
End: 2025-08-25
Payer: MEDICARE

## 2025-08-25 VITALS
SYSTOLIC BLOOD PRESSURE: 137 MMHG | TEMPERATURE: 98 F | RESPIRATION RATE: 18 BRPM | OXYGEN SATURATION: 100 % | DIASTOLIC BLOOD PRESSURE: 65 MMHG | WEIGHT: 152 LBS | BODY MASS INDEX: 25.33 KG/M2 | HEART RATE: 68 BPM | HEIGHT: 65 IN

## 2025-08-25 DIAGNOSIS — M35.1 MIXED CONNECTIVE TISSUE DISEASE: ICD-10-CM

## 2025-08-25 DIAGNOSIS — D84.821 DRUG-INDUCED IMMUNODEFICIENCY: ICD-10-CM

## 2025-08-25 DIAGNOSIS — R13.13 PHARYNGEAL DYSPHAGIA: ICD-10-CM

## 2025-08-25 DIAGNOSIS — D89.89 ANTISYNTHETASE SYNDROME: Primary | ICD-10-CM

## 2025-08-25 DIAGNOSIS — J84.9 ILD (INTERSTITIAL LUNG DISEASE): ICD-10-CM

## 2025-08-25 DIAGNOSIS — D89.89 ANTISYNTHETASE SYNDROME: ICD-10-CM

## 2025-08-25 DIAGNOSIS — Z79.899 DRUG-INDUCED IMMUNODEFICIENCY: ICD-10-CM

## 2025-08-25 DIAGNOSIS — I27.20 PULMONARY HYPERTENSION, UNSPECIFIED: ICD-10-CM

## 2025-08-25 DIAGNOSIS — R13.19 OTHER DYSPHAGIA: ICD-10-CM

## 2025-08-25 DIAGNOSIS — R49.0 DYSPHONIA: ICD-10-CM

## 2025-08-25 LAB
ALBUMIN SERPL-MCNC: 3.8 G/DL (ref 3.4–4.8)
ALBUMIN/GLOB SERPL: 0.8 RATIO (ref 1.1–2)
ALP SERPL-CCNC: 80 UNIT/L (ref 40–150)
ALT SERPL-CCNC: 9 UNIT/L (ref 0–55)
ANION GAP SERPL CALC-SCNC: 6 MEQ/L
AST SERPL-CCNC: 17 UNIT/L (ref 11–45)
BASOPHILS # BLD AUTO: 0.04 X10(3)/MCL
BASOPHILS NFR BLD AUTO: 0.6 %
BILIRUB SERPL-MCNC: 0.3 MG/DL
BUN SERPL-MCNC: 6.3 MG/DL (ref 9.8–20.1)
C3 SERPL-MCNC: 154 MG/DL (ref 80–173)
C4 SERPL-MCNC: 37 MG/DL (ref 13–46)
CALCIUM SERPL-MCNC: 9.8 MG/DL (ref 8.4–10.2)
CHLORIDE SERPL-SCNC: 108 MMOL/L (ref 98–107)
CK SERPL-CCNC: 137 U/L (ref 29–168)
CO2 SERPL-SCNC: 28 MMOL/L (ref 23–31)
CREAT SERPL-MCNC: 0.74 MG/DL (ref 0.55–1.02)
CREAT/UREA NIT SERPL: 9
CRP SERPL-MCNC: 2 MG/L
EOSINOPHIL # BLD AUTO: 0.03 X10(3)/MCL (ref 0–0.9)
EOSINOPHIL NFR BLD AUTO: 0.4 %
ERYTHROCYTE [DISTWIDTH] IN BLOOD BY AUTOMATED COUNT: 15.5 % (ref 11.5–17)
ERYTHROCYTE [SEDIMENTATION RATE] IN BLOOD: 44 MM/HR (ref 0–20)
GFR SERPLBLD CREATININE-BSD FMLA CKD-EPI: >60 ML/MIN/1.73/M2
GLOBULIN SER-MCNC: 4.6 GM/DL (ref 2.4–3.5)
GLUCOSE SERPL-MCNC: 84 MG/DL (ref 82–115)
HCT VFR BLD AUTO: 39.8 % (ref 37–47)
HGB BLD-MCNC: 11.8 G/DL (ref 12–16)
IMM GRANULOCYTES # BLD AUTO: 0.03 X10(3)/MCL (ref 0–0.04)
IMM GRANULOCYTES NFR BLD AUTO: 0.4 %
LYMPHOCYTES # BLD AUTO: 1.18 X10(3)/MCL (ref 0.6–4.6)
LYMPHOCYTES NFR BLD AUTO: 16.4 %
MCH RBC QN AUTO: 25.1 PG (ref 27–31)
MCHC RBC AUTO-ENTMCNC: 29.6 G/DL (ref 33–36)
MCV RBC AUTO: 84.5 FL (ref 80–94)
MONOCYTES # BLD AUTO: 0.32 X10(3)/MCL (ref 0.1–1.3)
MONOCYTES NFR BLD AUTO: 4.4 %
NEUTROPHILS # BLD AUTO: 5.61 X10(3)/MCL (ref 2.1–9.2)
NEUTROPHILS NFR BLD AUTO: 77.8 %
NRBC BLD AUTO-RTO: 0 %
PLATELET # BLD AUTO: 273 X10(3)/MCL (ref 130–400)
PMV BLD AUTO: 10.5 FL (ref 7.4–10.4)
POTASSIUM SERPL-SCNC: 4.7 MMOL/L (ref 3.5–5.1)
PROT SERPL-MCNC: 8.4 GM/DL (ref 5.8–7.6)
RBC # BLD AUTO: 4.71 X10(6)/MCL (ref 4.2–5.4)
SODIUM SERPL-SCNC: 142 MMOL/L (ref 136–145)
WBC # BLD AUTO: 7.21 X10(3)/MCL (ref 4.5–11.5)

## 2025-08-25 PROCEDURE — 99214 OFFICE O/P EST MOD 30 MIN: CPT | Mod: PBBFAC | Performed by: INTERNAL MEDICINE

## 2025-08-25 PROCEDURE — 82550 ASSAY OF CK (CPK): CPT

## 2025-08-25 PROCEDURE — 99215 OFFICE O/P EST HI 40 MIN: CPT | Mod: S$PBB,,, | Performed by: INTERNAL MEDICINE

## 2025-08-25 PROCEDURE — 86140 C-REACTIVE PROTEIN: CPT

## 2025-08-25 PROCEDURE — 36415 COLL VENOUS BLD VENIPUNCTURE: CPT

## 2025-08-25 PROCEDURE — 85025 COMPLETE CBC W/AUTO DIFF WBC: CPT

## 2025-08-25 PROCEDURE — 85652 RBC SED RATE AUTOMATED: CPT

## 2025-08-25 PROCEDURE — 86160 COMPLEMENT ANTIGEN: CPT

## 2025-08-25 PROCEDURE — 86160 COMPLEMENT ANTIGEN: CPT | Mod: 59

## 2025-08-25 PROCEDURE — G2211 COMPLEX E/M VISIT ADD ON: HCPCS | Mod: ,,, | Performed by: INTERNAL MEDICINE

## 2025-08-25 PROCEDURE — 80053 COMPREHEN METABOLIC PANEL: CPT

## 2025-08-25 RX ORDER — ZINC GLUCONATE 50 MG
50 TABLET ORAL DAILY
COMMUNITY
Start: 2025-04-14

## 2025-08-25 RX ORDER — PREDNISONE 2.5 MG/1
TABLET ORAL
Qty: 30 TABLET | Refills: 3 | Status: SHIPPED | OUTPATIENT
Start: 2025-08-25

## 2025-08-25 RX ORDER — PANTOPRAZOLE SODIUM 40 MG/1
40 FOR SUSPENSION ORAL DAILY
Qty: 90 PACKET | Refills: 6 | Status: SHIPPED | OUTPATIENT
Start: 2025-08-25

## 2025-08-25 RX ORDER — DEXTROMETHORPHAN POLISTIREX 30 MG/5 ML
500 SUSPENSION, EXTENDED RELEASE 12 HR ORAL DAILY
COMMUNITY

## 2025-08-25 RX ORDER — AZATHIOPRINE 50 MG/1
50 TABLET ORAL DAILY
Qty: 30 TABLET | Refills: 3 | Status: SHIPPED | OUTPATIENT
Start: 2025-08-25 | End: 2026-08-25

## 2025-08-25 RX ORDER — MYCOPHENOLATE MOFETIL 200 MG/ML
1500 POWDER, FOR SUSPENSION ORAL 2 TIMES DAILY
Qty: 450 ML | Refills: 3 | Status: SHIPPED | OUTPATIENT
Start: 2025-08-25

## 2025-08-25 RX ORDER — PROPRANOLOL HYDROCHLORIDE 40 MG/1
40 TABLET ORAL 2 TIMES DAILY
COMMUNITY

## 2025-08-25 RX ORDER — PREDNISONE 5 MG/1
5 TABLET ORAL DAILY
Qty: 30 TABLET | Refills: 3 | Status: SHIPPED | OUTPATIENT
Start: 2025-08-25

## 2025-08-25 RX ORDER — TRIAMCINOLONE ACETONIDE 55 UG/1
1 SPRAY, METERED NASAL DAILY
COMMUNITY
Start: 2025-04-14

## 2025-09-04 ENCOUNTER — HOSPITAL ENCOUNTER (OUTPATIENT)
Dept: RADIOLOGY | Facility: HOSPITAL | Age: 75
Discharge: HOME | End: 2025-09-04
Attending: INTERNAL MEDICINE
Payer: MEDICARE

## 2025-09-04 DIAGNOSIS — J84.9 ILD (INTERSTITIAL LUNG DISEASE): ICD-10-CM

## 2025-09-04 DIAGNOSIS — D89.89 ANTISYNTHETASE SYNDROME: ICD-10-CM

## 2025-09-04 PROCEDURE — 71250 CT THORAX DX C-: CPT | Mod: TC
